# Patient Record
Sex: FEMALE | Race: WHITE | Employment: FULL TIME | URBAN - METROPOLITAN AREA
[De-identification: names, ages, dates, MRNs, and addresses within clinical notes are randomized per-mention and may not be internally consistent; named-entity substitution may affect disease eponyms.]

---

## 2020-01-02 ENCOUNTER — TELEPHONE (OUTPATIENT)
Dept: PRIMARY CARE CLINIC | Age: 30
End: 2020-01-02

## 2020-01-02 NOTE — TELEPHONE ENCOUNTER
Pt coming in for NP appt 1/6/20 & had labs done 9/2019 at [facility] [place].

## 2020-01-06 ENCOUNTER — OFFICE VISIT (OUTPATIENT)
Dept: PRIMARY CARE CLINIC | Age: 30
End: 2020-01-06
Payer: COMMERCIAL

## 2020-01-06 VITALS
WEIGHT: 244 LBS | BODY MASS INDEX: 43.23 KG/M2 | HEIGHT: 63 IN | HEART RATE: 106 BPM | DIASTOLIC BLOOD PRESSURE: 88 MMHG | OXYGEN SATURATION: 98 % | SYSTOLIC BLOOD PRESSURE: 136 MMHG

## 2020-01-06 PROBLEM — J45.909 REACTIVE AIRWAY DISEASE WITHOUT COMPLICATION: Status: ACTIVE | Noted: 2020-01-06

## 2020-01-06 PROBLEM — G47.11 IDIOPATHIC HYPERSOMNOLENCE: Status: ACTIVE | Noted: 2020-01-06

## 2020-01-06 PROBLEM — I10 ESSENTIAL HYPERTENSION: Status: ACTIVE | Noted: 2020-01-06

## 2020-01-06 PROBLEM — E78.2 MIXED HYPERLIPIDEMIA: Status: ACTIVE | Noted: 2020-01-06

## 2020-01-06 LAB
CONTROL: NORMAL
PREGNANCY TEST URINE, POC: NORMAL

## 2020-01-06 PROCEDURE — 81025 URINE PREGNANCY TEST: CPT | Performed by: PHYSICIAN ASSISTANT

## 2020-01-06 PROCEDURE — 99204 OFFICE O/P NEW MOD 45 MIN: CPT | Performed by: PHYSICIAN ASSISTANT

## 2020-01-06 RX ORDER — MONTELUKAST SODIUM 10 MG/1
10 TABLET ORAL NIGHTLY
COMMUNITY
End: 2020-09-08 | Stop reason: SDUPTHER

## 2020-01-06 RX ORDER — FAMOTIDINE 40 MG/1
40 TABLET, FILM COATED ORAL DAILY
COMMUNITY
End: 2020-09-08 | Stop reason: SDUPTHER

## 2020-01-06 RX ORDER — ATENOLOL 25 MG/1
25 TABLET ORAL DAILY
COMMUNITY
End: 2021-03-02 | Stop reason: SDUPTHER

## 2020-01-06 RX ORDER — MULTIVIT-MINERALS/FOLIC ACID 150 MCG
TABLET,CHEWABLE ORAL
COMMUNITY
End: 2021-06-08

## 2020-01-06 RX ORDER — PANTOPRAZOLE SODIUM 40 MG/1
40 TABLET, DELAYED RELEASE ORAL DAILY
COMMUNITY
End: 2020-01-06 | Stop reason: SDUPTHER

## 2020-01-06 RX ORDER — PANTOPRAZOLE SODIUM 20 MG/1
20 TABLET, DELAYED RELEASE ORAL DAILY
Qty: 30 TABLET | Refills: 5 | Status: SHIPPED | OUTPATIENT
Start: 2020-01-06 | End: 2020-01-24 | Stop reason: ALTCHOICE

## 2020-01-06 RX ORDER — EPINEPHRINE 0.3 MG/.3ML
0.3 INJECTION SUBCUTANEOUS ONCE
Qty: 0.3 ML | Refills: 2 | Status: SHIPPED | OUTPATIENT
Start: 2020-01-06 | End: 2020-04-29 | Stop reason: SDUPTHER

## 2020-01-06 ASSESSMENT — PATIENT HEALTH QUESTIONNAIRE - PHQ9
2. FEELING DOWN, DEPRESSED OR HOPELESS: 0
SUM OF ALL RESPONSES TO PHQ QUESTIONS 1-9: 0
SUM OF ALL RESPONSES TO PHQ QUESTIONS 1-9: 0
SUM OF ALL RESPONSES TO PHQ9 QUESTIONS 1 & 2: 0
1. LITTLE INTEREST OR PLEASURE IN DOING THINGS: 0

## 2020-01-06 ASSESSMENT — ENCOUNTER SYMPTOMS
EYE PAIN: 0
VOMITING: 0
ABDOMINAL PAIN: 0
DIARRHEA: 0
EYE ITCHING: 0
CHEST TIGHTNESS: 0
CONSTIPATION: 0
SHORTNESS OF BREATH: 0
COUGH: 0
BACK PAIN: 0
BLOOD IN STOOL: 0
NAUSEA: 1
TROUBLE SWALLOWING: 0
VOICE CHANGE: 0

## 2020-01-06 NOTE — PROGRESS NOTES
disturbance. Respiratory: Negative for cough, chest tightness and shortness of breath. Cardiovascular: Negative for chest pain, palpitations and leg swelling. Gastrointestinal: Positive for nausea. Negative for abdominal pain, blood in stool, constipation, diarrhea and vomiting. Endocrine: Negative for cold intolerance, heat intolerance, polydipsia, polyphagia and polyuria. Genitourinary: Positive for menstrual problem. Negative for difficulty urinating, dysuria, flank pain, frequency, hematuria, pelvic pain, urgency, vaginal bleeding, vaginal discharge and vaginal pain. Musculoskeletal: Negative for arthralgias, back pain and myalgias. Skin: Negative for rash. Neurological: Negative for dizziness, syncope, speech difficulty, light-headedness and headaches. Hematological: Does not bruise/bleed easily. Psychiatric/Behavioral: Negative for dysphoric mood and sleep disturbance. The patient is not nervous/anxious. Objective:     BP (!) 144/90   Pulse 106   Ht 5' 3.25\" (1.607 m)   Wt 244 lb (110.7 kg)   SpO2 98%   BMI 42.88 kg/m²   Physical Exam  Vitals signs reviewed. Constitutional:       General: She is not in acute distress. Appearance: She is well-developed. HENT:      Head: Normocephalic and atraumatic. Right Ear: External ear normal.      Left Ear: External ear normal.      Nose: Nose normal.      Mouth/Throat:      Pharynx: No oropharyngeal exudate. Eyes:      General: No scleral icterus. Right eye: No discharge. Left eye: No discharge. Conjunctiva/sclera: Conjunctivae normal.      Pupils: Pupils are equal, round, and reactive to light. Neck:      Musculoskeletal: Normal range of motion. Thyroid: No thyromegaly. Cardiovascular:      Rate and Rhythm: Normal rate and regular rhythm. Heart sounds: Normal heart sounds. No murmur. No friction rub. No gallop.     Pulmonary:      Effort: Pulmonary effort is normal.      Breath sounds: Normal breath sounds. No wheezing or rales. Abdominal:      General: Bowel sounds are normal. There is no distension. Palpations: Abdomen is soft. There is no mass. Tenderness: There is no tenderness. There is no guarding or rebound. Musculoskeletal: Normal range of motion. General: No deformity. Lymphadenopathy:      Cervical: No cervical adenopathy. Skin:     General: Skin is warm. Capillary Refill: Capillary refill takes less than 2 seconds. Findings: No rash. Neurological:      Mental Status: She is alert and oriented to person, place, and time. Motor: No abnormal muscle tone. Coordination: Coordination normal.   Psychiatric:         Behavior: Behavior normal.         Thought Content: Thought content normal.         Judgment: Judgment normal.         Assessment:       Diagnosis Orders   1. Amenorrhea  POCT urine pregnancy    CBC Auto Differential    Comprehensive Metabolic Panel, Fasting    Insulin, Free    Luteinizing Hormone    Follicle Stimulating Hormone   2. Essential hypertension  Comprehensive Metabolic Panel, Fasting   3. Mixed hyperlipidemia  Comprehensive Metabolic Panel, Fasting    Lipid Panel   4. Mild intermittent reactive airway disease without complication     5. Idiopathic hypersomnolence     6. Allergy to food  EPINEPHrine (EPIPEN 2-ANNABELLE) 0.3 MG/0.3ML SOAJ injection   7. Irritable bowel syndrome with diarrhea     8. Gastroesophageal reflux disease, esophagitis presence not specified  pantoprazole (PROTONIX) 20 MG tablet        Plan:    TAke probiotic   FODMAPS given    BW and urine pregnancy ordered  Work on weight loss  MRR for Genoveva mast, Dr. Leandra Seo, Dr. Ana Woods refilled    Return for Lab Results---1/2 hour .     Orders Placed This Encounter   Procedures    CBC Auto Differential     Standing Status:   Future     Standing Expiration Date:   1/6/2021    Comprehensive Metabolic Panel, Fasting     Standing Status:   Future     Standing Expiration Date:   1/6/2021    Insulin, Free     Standing Status:   Future     Standing Expiration Date:   1/6/2021    Luteinizing Hormone     Standing Status:   Future     Standing Expiration Date:   7/2/2282    Follicle Stimulating Hormone     Standing Status:   Future     Standing Expiration Date:   1/6/2021    Lipid Panel     Standing Status:   Future     Standing Expiration Date:   1/6/2021     Order Specific Question:   Is Patient Fasting?/# of Hours     Answer:   10-12 hours    POCT urine pregnancy     Orders Placed This Encounter   Medications    pantoprazole (PROTONIX) 20 MG tablet     Sig: Take 1 tablet by mouth daily     Dispense:  30 tablet     Refill:  5    EPINEPHrine (EPIPEN 2-ANNABELLE) 0.3 MG/0.3ML SOAJ injection     Sig: Inject 0.3 mLs into the muscle once for 1 dose Use as directed for allergic reaction     Dispense:  0.3 mL     Refill:  2       Patient given educationalmaterials - see patient instructions. Discussed use, benefit, and side effectsof prescribed medications. All patient questions answered. Pt voiced understanding. Reviewed health maintenance. Instructed to continue current medications, diet andexercise. Patient agreed with treatment plan. Follow up as directed.      Electronicallysigned by Chris Rojas PA-C on 1/6/2020 at 2:45 PM

## 2020-01-07 ENCOUNTER — HOSPITAL ENCOUNTER (OUTPATIENT)
Facility: CLINIC | Age: 30
Discharge: HOME | End: 2020-01-07
Payer: COMMERCIAL

## 2020-01-07 LAB
ABSOLUTE EOS #: 0.12 K/UL (ref 0–0.44)
ABSOLUTE IMMATURE GRANULOCYTE: <0.03 K/UL (ref 0–0.3)
ABSOLUTE LYMPH #: 2.59 K/UL (ref 1.1–3.7)
ABSOLUTE MONO #: 0.74 K/UL (ref 0.1–1.2)
ALBUMIN SERPL-MCNC: 4 G/DL (ref 3.5–5.2)
ALBUMIN/GLOBULIN RATIO: 1.4 (ref 1–2.5)
ALP BLD-CCNC: 57 U/L (ref 35–104)
ALT SERPL-CCNC: 27 U/L (ref 5–33)
ANION GAP SERPL CALCULATED.3IONS-SCNC: 13 MMOL/L (ref 9–17)
AST SERPL-CCNC: 20 U/L
BASOPHILS # BLD: 1 % (ref 0–2)
BASOPHILS ABSOLUTE: 0.04 K/UL (ref 0–0.2)
BILIRUB SERPL-MCNC: 0.21 MG/DL (ref 0.3–1.2)
BUN BLDV-MCNC: 13 MG/DL (ref 6–20)
BUN/CREAT BLD: ABNORMAL (ref 9–20)
CALCIUM SERPL-MCNC: 9.1 MG/DL (ref 8.6–10.4)
CHLORIDE BLD-SCNC: 101 MMOL/L (ref 98–107)
CHOLESTEROL/HDL RATIO: 6.1
CHOLESTEROL: 245 MG/DL
CO2: 24 MMOL/L (ref 20–31)
CREAT SERPL-MCNC: 0.64 MG/DL (ref 0.5–0.9)
DIFFERENTIAL TYPE: NORMAL
EOSINOPHILS RELATIVE PERCENT: 2 % (ref 1–4)
FOLLICLE STIMULATING HORMONE: 2.1 U/L (ref 1.7–21.5)
GFR AFRICAN AMERICAN: >60 ML/MIN
GFR NON-AFRICAN AMERICAN: >60 ML/MIN
GFR SERPL CREATININE-BSD FRML MDRD: ABNORMAL ML/MIN/{1.73_M2}
GFR SERPL CREATININE-BSD FRML MDRD: ABNORMAL ML/MIN/{1.73_M2}
GLUCOSE FASTING: 101 MG/DL (ref 70–99)
HCT VFR BLD CALC: 41.6 % (ref 36.3–47.1)
HDLC SERPL-MCNC: 40 MG/DL
HEMOGLOBIN: 13.8 G/DL (ref 11.9–15.1)
IMMATURE GRANULOCYTES: 0 %
LDL CHOLESTEROL: 162 MG/DL (ref 0–130)
LH: 2 U/L (ref 1–95.6)
LYMPHOCYTES # BLD: 37 % (ref 24–43)
MCH RBC QN AUTO: 32.4 PG (ref 25.2–33.5)
MCHC RBC AUTO-ENTMCNC: 33.2 G/DL (ref 28.4–34.8)
MCV RBC AUTO: 97.7 FL (ref 82.6–102.9)
MONOCYTES # BLD: 11 % (ref 3–12)
NRBC AUTOMATED: 0 PER 100 WBC
PDW BLD-RTO: 12.3 % (ref 11.8–14.4)
PLATELET # BLD: 325 K/UL (ref 138–453)
PLATELET ESTIMATE: NORMAL
PMV BLD AUTO: 10.6 FL (ref 8.1–13.5)
POTASSIUM SERPL-SCNC: 4.2 MMOL/L (ref 3.7–5.3)
RBC # BLD: 4.26 M/UL (ref 3.95–5.11)
RBC # BLD: NORMAL 10*6/UL
SEG NEUTROPHILS: 49 % (ref 36–65)
SEGMENTED NEUTROPHILS ABSOLUTE COUNT: 3.53 K/UL (ref 1.5–8.1)
SODIUM BLD-SCNC: 138 MMOL/L (ref 135–144)
TOTAL PROTEIN: 6.8 G/DL (ref 6.4–8.3)
TRIGL SERPL-MCNC: 216 MG/DL
VLDLC SERPL CALC-MCNC: ABNORMAL MG/DL (ref 1–30)
WBC # BLD: 7 K/UL (ref 3.5–11.3)
WBC # BLD: NORMAL 10*3/UL

## 2020-01-07 PROCEDURE — 83001 ASSAY OF GONADOTROPIN (FSH): CPT

## 2020-01-07 PROCEDURE — 36415 COLL VENOUS BLD VENIPUNCTURE: CPT

## 2020-01-07 PROCEDURE — 83525 ASSAY OF INSULIN: CPT

## 2020-01-07 PROCEDURE — 83527 ASSAY OF INSULIN: CPT

## 2020-01-07 PROCEDURE — 83002 ASSAY OF GONADOTROPIN (LH): CPT

## 2020-01-07 PROCEDURE — 85025 COMPLETE CBC W/AUTO DIFF WBC: CPT

## 2020-01-07 PROCEDURE — 80053 COMPREHEN METABOLIC PANEL: CPT

## 2020-01-07 PROCEDURE — 80061 LIPID PANEL: CPT

## 2020-01-08 LAB
INSULIN FREE: 35 UIU/ML (ref 3–19)
INSULIN: 44 UIU/ML (ref 3–19)

## 2020-01-24 ENCOUNTER — PROCEDURE VISIT (OUTPATIENT)
Dept: PRIMARY CARE CLINIC | Age: 30
End: 2020-01-24
Payer: COMMERCIAL

## 2020-01-24 VITALS
HEART RATE: 93 BPM | HEIGHT: 63 IN | OXYGEN SATURATION: 98 % | BODY MASS INDEX: 43.23 KG/M2 | SYSTOLIC BLOOD PRESSURE: 130 MMHG | DIASTOLIC BLOOD PRESSURE: 82 MMHG | WEIGHT: 244 LBS

## 2020-01-24 PROCEDURE — 99213 OFFICE O/P EST LOW 20 MIN: CPT | Performed by: PHYSICIAN ASSISTANT

## 2020-01-24 PROCEDURE — 17110 DESTRUCTION B9 LES UP TO 14: CPT | Performed by: PHYSICIAN ASSISTANT

## 2020-01-24 RX ORDER — ROSUVASTATIN CALCIUM 10 MG/1
10 TABLET, COATED ORAL DAILY
Qty: 90 TABLET | Refills: 1 | Status: SHIPPED
Start: 2020-01-24 | End: 2020-05-15 | Stop reason: ALTCHOICE

## 2020-01-24 RX ORDER — PANTOPRAZOLE SODIUM 40 MG/1
40 TABLET, DELAYED RELEASE ORAL
Qty: 90 TABLET | Refills: 1 | Status: SHIPPED | OUTPATIENT
Start: 2020-01-24 | End: 2020-05-05 | Stop reason: ALTCHOICE

## 2020-01-24 ASSESSMENT — ENCOUNTER SYMPTOMS
WHEEZING: 0
ABDOMINAL PAIN: 0
DIARRHEA: 0
VOMITING: 0
NAUSEA: 0
EYE DISCHARGE: 0
EYE REDNESS: 0
SORE THROAT: 0
COUGH: 0
SHORTNESS OF BREATH: 0
RHINORRHEA: 0

## 2020-01-24 NOTE — PROGRESS NOTES
[address] [facility] PRIMARY CARE  [address] [facility] [address]  Dept: Marisabel Brito is a 34 y.o. female who presents today for her medical conditions/complaintsas noted below. Chief Complaint   Patient presents with    Other     wart removal on middle right finger.  Results     discuss lab work from 1/7/2020       HPI:     HPI  Insulin levels and cholesterol are high  Still ammenorrehic  Has had one hemorrhagic ovarian cyst in the past    She states she can not stay on only Protonix 20 mg with Pepcid as her GERD is worse and she can't sleep. Denies dark stool and hemoptysis. LDL Cholesterol (mg/dL)   Date Value   01/07/2020 162 (H)       (goal LDL is <100)   AST (U/L)   Date Value   01/07/2020 20     ALT (U/L)   Date Value   01/07/2020 27     BUN (mg/dL)   Date Value   01/07/2020 13     BP Readings from Last 3 Encounters:   01/24/20 130/82   01/06/20 136/88   02/13/13 123/77          (goal 120/80)    Past Medical History:   Diagnosis Date    Asthma     Gastroparesis     Headache     Hyperlipidemia     Hypertension     Irritable bowel syndrome     Scoliosis     has an S curve       Past Surgical History:   Procedure Laterality Date    ADENOIDECTOMY      CHOLECYSTECTOMY  2-1-2013    COLONOSCOPY      TONSILLECTOMY      TYMPANOSTOMY TUBE PLACEMENT         Family History   Problem Relation Age of Onset    Depression Mother    Dickon August Other Mother         pancreatic issues.     High Blood Pressure Mother     High Cholesterol Mother        Social History     Tobacco Use    Smoking status: Never Smoker    Smokeless tobacco: Never Used   Substance Use Topics    Alcohol use: Yes     Comment: occa      Current Outpatient Medications   Medication Sig Dispense Refill    rosuvastatin (CRESTOR) 10 MG tablet Take 1 tablet by mouth daily 90 tablet 1    pantoprazole (PROTONIX) 40 MG tablet Take 1 tablet by mouth every morning (before breakfast)

## 2020-02-04 LAB
AVERAGE GLUCOSE: 94
HBA1C MFR BLD: 4.9 %

## 2020-02-05 ENCOUNTER — TELEPHONE (OUTPATIENT)
Dept: PRIMARY CARE CLINIC | Age: 30
End: 2020-02-05

## 2020-02-05 NOTE — TELEPHONE ENCOUNTER
Pt new to you on 1/6/20. C/o urinary frequency, odor, low back and pelvic pain and has a little burning. Symptoms started 2 days ago. Temp 99.5. Asking if you would send in an abx, or an order for her? Uses [facility] on [facility]. If you send an order for a ua, it would need to be faxed to the [facility]. Pt seeing you later this month again and states her deductable is 200.00 every time she comes in. Please Advise.  [phone]

## 2020-02-17 ENCOUNTER — TELEPHONE (OUTPATIENT)
Dept: PRIMARY CARE CLINIC | Age: 30
End: 2020-02-17

## 2020-02-19 ENCOUNTER — OFFICE VISIT (OUTPATIENT)
Dept: PRIMARY CARE CLINIC | Age: 30
End: 2020-02-19
Payer: COMMERCIAL

## 2020-02-19 VITALS
BODY MASS INDEX: 43.51 KG/M2 | WEIGHT: 245.6 LBS | DIASTOLIC BLOOD PRESSURE: 80 MMHG | HEART RATE: 86 BPM | OXYGEN SATURATION: 98 % | SYSTOLIC BLOOD PRESSURE: 112 MMHG

## 2020-02-19 PROBLEM — K58.0 IRRITABLE BOWEL SYNDROME WITH DIARRHEA: Status: ACTIVE | Noted: 2020-02-19

## 2020-02-19 PROBLEM — E88.81 INSULIN RESISTANCE: Status: ACTIVE | Noted: 2020-02-19

## 2020-02-19 PROBLEM — E88.819 INSULIN RESISTANCE: Status: ACTIVE | Noted: 2020-02-19

## 2020-02-19 PROBLEM — H65.93 FLUID LEVEL BEHIND TYMPANIC MEMBRANE OF BOTH EARS: Status: ACTIVE | Noted: 2020-02-19

## 2020-02-19 PROCEDURE — 99213 OFFICE O/P EST LOW 20 MIN: CPT | Performed by: PHYSICIAN ASSISTANT

## 2020-02-19 ASSESSMENT — ENCOUNTER SYMPTOMS
EYES NEGATIVE: 1
RHINORRHEA: 0
SINUS PAIN: 0

## 2020-02-19 NOTE — PROGRESS NOTES
[address] [facility] PRIMARY CARE  [address] [facility] [address]  Dept: Marisabel Brito is a 31 y.o. female who presents today for her medical conditions/complaintsas noted below. Chief Complaint   Patient presents with    Follow Up After Procedure     Pt here for follow up of ultra sound.  Medication Check     Pt would like to discuss Metformin pros and cons.  Otalgia     Pt states ear feel clooged. HPI:     HPI  Pelvic US is normal  Insulin level is elevated, AIC is normal. Trending up per patient. Is obese. Has not started the Metformin yet. Has IBS with diarrhea usually  So she is worried. Started vaginally spotting x 3 days. Stress has reduced since taking NCLEX  Test.        LDL Cholesterol (mg/dL)   Date Value   01/07/2020 162 (H)       (goal LDL is <100)   AST (U/L)   Date Value   01/07/2020 20     ALT (U/L)   Date Value   01/07/2020 27     BUN (mg/dL)   Date Value   01/07/2020 13     BP Readings from Last 3 Encounters:   02/19/20 112/80   01/24/20 130/82   01/06/20 136/88          (goal 120/80)    Past Medical History:   Diagnosis Date    Asthma     Gastroparesis     Headache     Hyperlipidemia     Hypertension     Irritable bowel syndrome     Scoliosis     has an S curve       Past Surgical History:   Procedure Laterality Date    ADENOIDECTOMY      CHOLECYSTECTOMY  2-1-2013    COLONOSCOPY      TONSILLECTOMY      TYMPANOSTOMY TUBE PLACEMENT         Family History   Problem Relation Age of Onset    Depression Mother    Martin Ruiz Other Mother         pancreatic issues.     High Blood Pressure Mother     High Cholesterol Mother        Social History     Tobacco Use    Smoking status: Never Smoker    Smokeless tobacco: Never Used   Substance Use Topics    Alcohol use: Yes     Comment: occa      Current Outpatient Medications   Medication Sig Dispense Refill    metFORMIN (GLUCOPHAGE) 500 MG tablet Take 1 tablet by mouth 2 times daily (with meals) 60 tablet 5    rosuvastatin (CRESTOR) 10 MG tablet Take 1 tablet by mouth daily 90 tablet 1    pantoprazole (PROTONIX) 40 MG tablet Take 1 tablet by mouth every morning (before breakfast) 90 tablet 1    EPINEPHrine HCl, Anaphylaxis, (EPIPEN 2-ANNABELLE IM) Inject into the muscle      Cetirizine HCl 10 MG CAPS Take by mouth      montelukast (SINGULAIR) 10 MG tablet Take 10 mg by mouth nightly      atenolol (TENORMIN) 25 MG tablet Take 25 mg by mouth daily      famotidine (PEPCID) 40 MG tablet Take 40 mg by mouth daily      ondansetron (ZOFRAN-ODT) 4 MG disintegrating tablet Take 4 mg by mouth every 12 hours as needed.  Multiple Vitamins-Minerals (CENTRUM MULTIGUMMIES) CHEW Take by mouth      Cholecalciferol (VITAMIN D3 PO) Take by mouth      EPINEPHrine (EPIPEN 2-ANNABELLE) 0.3 MG/0.3ML SOAJ injection Inject 0.3 mLs into the muscle once for 1 dose Use as directed for allergic reaction 0.3 mL 2     No current facility-administered medications for this visit.       Allergies   Allergen Reactions    Latex Rash    Avocado Anaphylaxis    Banana Anaphylaxis    Menthol (Topical Analgesic) [Menthol (Topical Analgesic)] Other (See Comments)     Burning and rash    Sudafed [Pseudoephedrine Hcl]     Tape [Adhesive Tape] Rash       Health Maintenance   Topic Date Due    Pneumococcal 0-64 years Vaccine (1 of 1 - PPSV23) 02/03/1998    DTaP/Tdap/Td vaccine (1 - Tdap) 02/03/2003    HIV screen  02/03/2007    Varicella vaccine (2 of 2 - 13+ 2-dose series) 08/24/2011    Cervical cancer screen  02/03/2013    Lipid screen  01/07/2021    Potassium monitoring  01/07/2021    Creatinine monitoring  01/07/2021    Shingles Vaccine (1 of 2) 02/03/2042    Hepatitis B vaccine  Completed    Flu vaccine  Completed    Hepatitis A vaccine  Aged Out    Hib vaccine  Aged Out    Meningococcal (ACWY) vaccine  Aged Out       Subjective:      Review of Systems   HENT: Positive for congestion and ear pain (\"full\"). Negative for postnasal drip, rhinorrhea, sinus pain and sneezing. Eyes: Negative. Allergic/Immunologic: Positive for environmental allergies. Objective:     /80   Pulse 86   Wt 245 lb 9.6 oz (111.4 kg)   LMP 06/04/2019 (Exact Date)   SpO2 98%   BMI 43.51 kg/m²   Physical Exam  Vitals signs and nursing note reviewed. Constitutional:       Appearance: She is obese. HENT:      Right Ear: Ear canal and external ear normal. A middle ear effusion is present. Left Ear: Ear canal and external ear normal. A middle ear effusion is present. Tympanic membrane is retracted. Cardiovascular:      Rate and Rhythm: Normal rate and regular rhythm. Heart sounds: Normal heart sounds. Pulmonary:      Effort: Pulmonary effort is normal.      Breath sounds: Normal breath sounds. Neurological:      Mental Status: She is alert and oriented to person, place, and time. Psychiatric:         Mood and Affect: Mood normal.         Assessment:       Diagnosis Orders   1. Insulin resistance     2. Irritable bowel syndrome with diarrhea     3. Fluid level behind tympanic membrane of both ears          Plan:    Start Metformin and wean slowly  Work on weight loss  Continue Zyrtec and add Flonase x 1 -2 months. Also look into Saxenda incase we can not use Metformin. Return if symptoms worsen or fail to improve. No orders of the defined types were placed in this encounter. No orders of the defined types were placed in this encounter. Patient given educationalmaterials - see patient instructions. Discussed use, benefit, and side effectsof prescribed medications. All patient questions answered. Pt voiced understanding. Reviewed health maintenance. Instructed to continue current medications, diet andexercise. Patient agreed with treatment plan. Follow up as directed.      Electronicallysigned by Yuli Law PA-C on 2/19/2020 at 2:00 PM

## 2020-04-27 ENCOUNTER — TELEPHONE (OUTPATIENT)
Dept: PRIMARY CARE CLINIC | Age: 30
End: 2020-04-27

## 2020-04-27 NOTE — TELEPHONE ENCOUNTER
Patient calling states that she had the COVDI-19 test done on Saturday at \"the [facility]\" and is awating the results.

## 2020-04-29 ENCOUNTER — OFFICE VISIT (OUTPATIENT)
Dept: PRIMARY CARE CLINIC | Age: 30
End: 2020-04-29
Payer: COMMERCIAL

## 2020-04-29 ENCOUNTER — HOSPITAL ENCOUNTER (OUTPATIENT)
Age: 30
Discharge: HOME | End: 2020-04-29
Payer: COMMERCIAL

## 2020-04-29 ENCOUNTER — HOSPITAL ENCOUNTER (OUTPATIENT)
Dept: CT IMAGING | Age: 30
Discharge: HOME | End: 2020-05-01
Payer: COMMERCIAL

## 2020-04-29 VITALS
OXYGEN SATURATION: 98 % | WEIGHT: 241.4 LBS | SYSTOLIC BLOOD PRESSURE: 132 MMHG | BODY MASS INDEX: 42.76 KG/M2 | TEMPERATURE: 98.1 F | HEART RATE: 96 BPM | DIASTOLIC BLOOD PRESSURE: 88 MMHG

## 2020-04-29 LAB
ALBUMIN SERPL-MCNC: 4.7 G/DL (ref 3.5–5.2)
ALBUMIN/GLOBULIN RATIO: 1.5 (ref 1–2.5)
ALP BLD-CCNC: 62 U/L (ref 35–104)
ALT SERPL-CCNC: 41 U/L (ref 5–33)
ANION GAP SERPL CALCULATED.3IONS-SCNC: 18 MMOL/L (ref 9–17)
AST SERPL-CCNC: 36 U/L
BILIRUB SERPL-MCNC: 0.46 MG/DL (ref 0.3–1.2)
BUN BLDV-MCNC: 15 MG/DL (ref 6–20)
BUN/CREAT BLD: ABNORMAL (ref 9–20)
CALCIUM SERPL-MCNC: 10.3 MG/DL (ref 8.6–10.4)
CHLORIDE BLD-SCNC: 100 MMOL/L (ref 98–107)
CO2: 23 MMOL/L (ref 20–31)
CONTROL: PRESENT
CREAT SERPL-MCNC: 0.67 MG/DL (ref 0.5–0.9)
CREAT SERPL-MCNC: 0.68 MG/DL (ref 0.5–0.9)
GFR AFRICAN AMERICAN: >60 ML/MIN
GFR AFRICAN AMERICAN: >60 ML/MIN
GFR NON-AFRICAN AMERICAN: >60 ML/MIN
GFR NON-AFRICAN AMERICAN: >60 ML/MIN
GFR SERPL CREATININE-BSD FRML MDRD: ABNORMAL ML/MIN/{1.73_M2}
GFR SERPL CREATININE-BSD FRML MDRD: ABNORMAL ML/MIN/{1.73_M2}
GFR SERPL CREATININE-BSD FRML MDRD: NORMAL ML/MIN/{1.73_M2}
GFR SERPL CREATININE-BSD FRML MDRD: NORMAL ML/MIN/{1.73_M2}
GLUCOSE BLD-MCNC: 82 MG/DL (ref 70–99)
POTASSIUM SERPL-SCNC: 4 MMOL/L (ref 3.7–5.3)
PREGNANCY TEST URINE, POC: NORMAL
SODIUM BLD-SCNC: 141 MMOL/L (ref 135–144)
TOTAL PROTEIN: 7.9 G/DL (ref 6.4–8.3)

## 2020-04-29 PROCEDURE — 82565 ASSAY OF CREATININE: CPT

## 2020-04-29 PROCEDURE — 2580000003 HC RX 258: Performed by: PHYSICIAN ASSISTANT

## 2020-04-29 PROCEDURE — 74177 CT ABD & PELVIS W/CONTRAST: CPT

## 2020-04-29 PROCEDURE — 81025 URINE PREGNANCY TEST: CPT | Performed by: PHYSICIAN ASSISTANT

## 2020-04-29 PROCEDURE — 80053 COMPREHEN METABOLIC PANEL: CPT

## 2020-04-29 PROCEDURE — 99214 OFFICE O/P EST MOD 30 MIN: CPT | Performed by: PHYSICIAN ASSISTANT

## 2020-04-29 PROCEDURE — 36415 COLL VENOUS BLD VENIPUNCTURE: CPT

## 2020-04-29 PROCEDURE — 6360000004 HC RX CONTRAST MEDICATION: Performed by: PHYSICIAN ASSISTANT

## 2020-04-29 RX ORDER — ONDANSETRON 4 MG/1
4 TABLET, ORALLY DISINTEGRATING ORAL EVERY 8 HOURS PRN
Qty: 60 TABLET | Refills: 0 | Status: SHIPPED
Start: 2020-04-29 | End: 2020-05-15 | Stop reason: ALTCHOICE

## 2020-04-29 RX ORDER — 0.9 % SODIUM CHLORIDE 0.9 %
80 INTRAVENOUS SOLUTION INTRAVENOUS ONCE
Status: COMPLETED | OUTPATIENT
Start: 2020-04-29 | End: 2020-04-29

## 2020-04-29 RX ORDER — SODIUM CHLORIDE 0.9 % (FLUSH) 0.9 %
10 SYRINGE (ML) INJECTION PRN
Status: DISCONTINUED | OUTPATIENT
Start: 2020-04-29 | End: 2020-05-02 | Stop reason: HOSPADM

## 2020-04-29 RX ORDER — HYDROCORTISONE 25 MG/G
CREAM TOPICAL
Qty: 1 TUBE | Refills: 1 | Status: SHIPPED | OUTPATIENT
Start: 2020-04-29 | End: 2021-12-30

## 2020-04-29 RX ADMIN — IOVERSOL 75 ML: 741 INJECTION INTRA-ARTERIAL; INTRAVENOUS at 17:57

## 2020-04-29 RX ADMIN — IOHEXOL 50 ML: 240 INJECTION, SOLUTION INTRATHECAL; INTRAVASCULAR; INTRAVENOUS; ORAL at 17:52

## 2020-04-29 RX ADMIN — Medication 10 ML: at 17:57

## 2020-04-29 RX ADMIN — SODIUM CHLORIDE 80 ML: 9 INJECTION, SOLUTION INTRAVENOUS at 17:57

## 2020-04-29 ASSESSMENT — ENCOUNTER SYMPTOMS
ABDOMINAL PAIN: 1
SHORTNESS OF BREATH: 0
CONSTIPATION: 1
BACK PAIN: 0
RESPIRATORY NEGATIVE: 1
NAUSEA: 1
WHEEZING: 0
VOMITING: 1
DIARRHEA: 1
ABDOMINAL DISTENTION: 0
COUGH: 0

## 2020-04-29 NOTE — TELEPHONE ENCOUNTER
Sounds like she needs to be seen in office today---put her in at 3:30 with me or if the pain becomes severe, she should go to ER.

## 2020-04-29 NOTE — TELEPHONE ENCOUNTER
Patient calling back and states she got her COVID test results and they was negative. She states she does not have a fever highest it gets is around 99's-101. She states she has had abdominal pain since Friday she states the pain is by her Diaphragm that is sharp pain and is very tender to the touch. She also reports nausea and vomiting (2x since Friday). She would like to know what to do. Please advise.      [facility] in [place]

## 2020-04-30 ENCOUNTER — TELEPHONE (OUTPATIENT)
Dept: PRIMARY CARE CLINIC | Age: 30
End: 2020-04-30

## 2020-04-30 NOTE — TELEPHONE ENCOUNTER
Pt states her employer is requiring a statement saying she does not have COVID. [id number] [address] for note? If so, she would like to have it sent to her MyChart. Pt still nauseous, \"burning\" stomach, any food/drink makes her want to vomit. I advised ER by tomorrow AM if still not eating/drinking or today if feeling worse.

## 2020-05-01 RX ORDER — SUCRALFATE 1 G/1
1 TABLET ORAL 4 TIMES DAILY
Qty: 120 TABLET | Refills: 3 | Status: SHIPPED
Start: 2020-05-01 | End: 2020-07-23

## 2020-05-01 NOTE — TELEPHONE ENCOUNTER
Spoke to [employer]. Still having lots for burning epigastrically and no appetite. No fever. Discussed her CT findings. She had some vomiting--no hemoptysis or hematemesis. NO BR  blood or dark stool---having diarrhea. NO change in color of skin. Sent in Carafate for the burning. If she is not improved by Monday, then MRI for liver protocol and refer to GI. Also instructed to go to ER if hemoptysis, hematemesis, melena, fever or worsening pain.

## 2020-05-01 NOTE — TELEPHONE ENCOUNTER
Spoke with patient and she states she does not need note anymore now her boss is telling her what she turned in for work is acceptable. She still has the nausea and vomiting. She states when she forced her self to eat she felt super nauseated and this is scarring her because she is a [employer] girl\" and this is very unusual. She denied VV.

## 2020-05-02 ENCOUNTER — TELEPHONE (OUTPATIENT)
Dept: PRIMARY CARE CLINIC | Age: 30
End: 2020-05-02

## 2020-05-02 NOTE — TELEPHONE ENCOUNTER
Abdominal pain is worse. Unable to eat or drink. She stopped taking multivitamin with banana powder in it 2 days ago. Stool is thin but no blood. Difficulty taking medication and medication not effective when she does take it. Going to ER.

## 2020-05-04 NOTE — TELEPHONE ENCOUNTER
Patient notified- Verbalized understanding  Patient states she will compromise and only take half of a tablet, she does not feel comfortable taking this med due to this is the only thing that has changed.

## 2020-05-05 ENCOUNTER — OFFICE VISIT (OUTPATIENT)
Dept: GASTROENTEROLOGY | Age: 30
End: 2020-05-05
Payer: COMMERCIAL

## 2020-05-05 ENCOUNTER — HOSPITAL ENCOUNTER (OUTPATIENT)
Age: 30
Discharge: HOME | End: 2020-05-05
Payer: COMMERCIAL

## 2020-05-05 VITALS — BODY MASS INDEX: 42.77 KG/M2 | WEIGHT: 241.4 LBS | HEIGHT: 63 IN | TEMPERATURE: 98.6 F

## 2020-05-05 LAB — TSH SERPL DL<=0.05 MIU/L-ACNC: 2.52 MIU/L (ref 0.3–5)

## 2020-05-05 PROCEDURE — 86003 ALLG SPEC IGE CRUDE XTRC EA: CPT

## 2020-05-05 PROCEDURE — 82785 ASSAY OF IGE: CPT

## 2020-05-05 PROCEDURE — 86256 FLUORESCENT ANTIBODY TITER: CPT

## 2020-05-05 PROCEDURE — 36415 COLL VENOUS BLD VENIPUNCTURE: CPT

## 2020-05-05 PROCEDURE — 82784 ASSAY IGA/IGD/IGG/IGM EACH: CPT

## 2020-05-05 PROCEDURE — 86671 FUNGUS NES ANTIBODY: CPT

## 2020-05-05 PROCEDURE — 84443 ASSAY THYROID STIM HORMONE: CPT

## 2020-05-05 PROCEDURE — 86255 FLUORESCENT ANTIBODY SCREEN: CPT

## 2020-05-05 PROCEDURE — 99204 OFFICE O/P NEW MOD 45 MIN: CPT | Performed by: INTERNAL MEDICINE

## 2020-05-05 PROCEDURE — 83516 IMMUNOASSAY NONANTIBODY: CPT

## 2020-05-05 RX ORDER — DICYCLOMINE HCL 20 MG
20 TABLET ORAL EVERY 6 HOURS
COMMUNITY
End: 2020-05-15 | Stop reason: ALTCHOICE

## 2020-05-05 RX ORDER — PROMETHAZINE HYDROCHLORIDE 12.5 MG/1
12.5 TABLET ORAL 3 TIMES DAILY PRN
Qty: 12 TABLET | Refills: 0 | Status: SHIPPED | OUTPATIENT
Start: 2020-05-05 | End: 2020-05-12

## 2020-05-05 RX ORDER — DIPHENHYDRAMINE HCL 25 MG
50 TABLET ORAL EVERY 6 HOURS PRN
COMMUNITY

## 2020-05-05 RX ORDER — PANTOPRAZOLE SODIUM 40 MG/1
40 TABLET, DELAYED RELEASE ORAL 2 TIMES DAILY
Qty: 180 TABLET | Refills: 1 | Status: SHIPPED | OUTPATIENT
Start: 2020-05-05 | End: 2020-05-12 | Stop reason: SDUPTHER

## 2020-05-05 ASSESSMENT — ENCOUNTER SYMPTOMS
VOMITING: 1
SHORTNESS OF BREATH: 0
ABDOMINAL PAIN: 1
COUGH: 0
NAUSEA: 1
DIARRHEA: 1
SORE THROAT: 1

## 2020-05-05 NOTE — PROGRESS NOTES
Review of Systems   HENT: Positive for sore throat. Eyes: Positive for visual disturbance. Respiratory: Negative for cough and shortness of breath. Cardiovascular: Negative for chest pain and leg swelling. Gastrointestinal: Positive for abdominal pain, diarrhea, nausea and vomiting. Diarrhea is more soft stool and thin. Allergic/Immunologic: Positive for environmental allergies and food allergies. Hematological: Does not bruise/bleed easily. Psychiatric/Behavioral: Positive for sleep disturbance. Cannot sleep due to the burning.

## 2020-05-05 NOTE — PROGRESS NOTES
01/07/2020     01/07/2020     04/29/2020    K 4.0 04/29/2020     04/29/2020    CO2 23 04/29/2020    BUN 15 04/29/2020    CREATININE 0.68 04/29/2020    LABPROT 6.7 01/28/2013    LABALBU 4.7 04/29/2020    BILITOT 0.46 04/29/2020    ALKPHOS 62 04/29/2020    AST 36 (H) 04/29/2020    ALT 41 (H) 04/29/2020         Lab Results   Component Value Date    RBC 4.26 01/07/2020    HGB 13.8 01/07/2020    MCV 97.7 01/07/2020    MCH 32.4 01/07/2020    MCHC 33.2 01/07/2020    RDW 12.3 01/07/2020    MPV 10.6 01/07/2020    BASOPCT 1 01/07/2020    LYMPHSABS 2.59 01/07/2020    MONOSABS 0.74 01/07/2020    NEUTROABS 3.53 01/07/2020    EOSABS 0.12 01/07/2020    BASOSABS 0.04 01/07/2020         DIAGNOSTIC TESTING:     Ct Abdomen Pelvis W Iv Contrast    Result Date: 4/29/2020  EXAMINATION: CT OF THE ABDOMEN AND PELVIS WITH CONTRAST 4/29/2020 4:59 pm TECHNIQUE: CT of the abdomen and pelvis was performed with the administration of intravenous contrast. Multiplanar reformatted images are provided for review. Dose modulation, iterative reconstruction, and/or weight based adjustment of the mA/kV was utilized to reduce the radiation dose to as low as reasonably achievable. COMPARISON: None. HISTORY: ORDERING SYSTEM PROVIDED HISTORY: Abdominal pain, unspecified abdominal location TECHNOLOGIST PROVIDED HISTORY: abdominal pain with N/V Reason for Exam: mid abdominal and epigastric abdominal pain with low back pain x 1 week Acuity: Acute Type of Exam: Initial Additional signs and symptoms: nausea and vomiting. Relevant Medical/Surgical History: Hx of HTN, Sx: Cholecystectomy, LMP 4/3/20. Negative preg today at family doctor. FINDINGS: Lower Chest:  Visualized portion of the lower chest demonstrates no acute abnormality. No free air noted within the abdomen or pelvis. Organs: Liver is decreased in attenuation diffusely. There is a relative area of increased enhancement measuring 3.9 x 1.5 cm in the right hepatic lobe.   Liver is sources of her symptoms. EGD disclosed what appears to be fundic gland polyps, no other findings were mentioned, records are being obtained for further detail. Recommendation:  Hepatic lesions, possible fatty sparing vs hemagioma, pending MRI of liver to further characterize this lesion. There is evidence of hepatic steatosis and fatty liver on the CAT scan that was recently obtained, her AST ALT pattern appear to be consistent with fatty liver. History of HTN, HL, insulin resistance based on history-this is to be managed primarily by primary care physician  Protonix 40mg to be increased to twice daily dosing for maximum acid suppression  Carafate 1 g 4 times daily to be crushed and taken as well. Pepcid only to be continued if there is added benefit  Possible IBS- type to be delineated in the future after serological exclusion of other etiologies  Will provide Phenergan trial and monitor for response  May consider baclofen in the future  Celiac disease panel, TSH, IBD panel to be obtained  RTC in 4 weeks. Spent 30 minutes providing patient education and counseling. Thank you for allowing me to participate in the care of Ms. Dixie Maldonado. For any further questions please do not hesitate to contact me. I have reviewed and agree with the MA/LPN ROS. Nico Adam MD, MPH   [facility]  Office #: [phone]          his note is created with the assistance of a speech recognition program.  While intending to generate a document that actually reflects the content of the visit, the document can still have some errors including those of syntax and sound a like substitutions which may escape proof reading. It such instances, actual meaning can be extrapolated by contextual diversion.

## 2020-05-06 LAB
ALLERGEN BARLEY IGE: <0.34 KU/L (ref 0–0.34)
ALLERGEN BEEF: <0.34 KU/L (ref 0–0.34)
ALLERGEN CABBAGE IGE: <0.34 KU/L (ref 0–0.34)
ALLERGEN CARROT IGE: <0.34 KU/L (ref 0–0.34)
ALLERGEN CHICKEN IGE: <0.34 KU/L (ref 0–0.34)
ALLERGEN CODFISH IGE: <0.34 KU/L (ref 0–0.34)
ALLERGEN CORN IGE: <0.34 KU/L (ref 0–0.34)
ALLERGEN COW MILK IGE: <0.34 KU/L (ref 0–0.34)
ALLERGEN CRAB IGE: <0.34 KU/L (ref 0–0.34)
ALLERGEN EGG WHITE IGE: <0.34 KU/L (ref 0–0.34)
ALLERGEN GRAPE IGE: <0.34 KU/L (ref 0–0.34)
ALLERGEN LETTUCE IGE: <0.34 KU/L (ref 0–0.34)
ALLERGEN NAVY BEAN: <0.34 KU/L (ref 0–0.34)
ALLERGEN OAT: <0.34 KU/L (ref 0–0.34)
ALLERGEN ORANGE IGE: <0.34 KU/L (ref 0–0.34)
ALLERGEN PEANUT (F13) IGE: <0.34 KU/L (ref 0–0.34)
ALLERGEN PEPPER C. ANNUUM IGE: <0.34 KU/L (ref 0–0.34)
ALLERGEN PORK: <0.34 KU/L (ref 0–0.34)
ALLERGEN RICE IGE: <0.34 KU/L (ref 0–0.34)
ALLERGEN RYE IGE: <0.34 KU/L (ref 0–0.34)
ALLERGEN SOYBEAN IGE: <0.34 KU/L (ref 0–0.34)
ALLERGEN TOMATO IGE: <0.34 KU/L (ref 0–0.34)
ALLERGEN TUNA IGE: <0.34 KU/L (ref 0–0.34)
ALLERGEN WHEAT IGE: <0.34 KU/L (ref 0–0.34)
GLIADIN DEAMINIDATED PEPTIDE AB IGA: 0.3 U/ML
GLIADIN DEAMINIDATED PEPTIDE AB IGG: <0.4 U/ML
IGA: 151 MG/DL (ref 70–400)
IGE: 3 IU/ML
POTATO, IGE: <0.34 KU/L (ref 0–0.34)
SHRIMP: <0.34 KU/L (ref 0–0.34)
TISSUE TRANSGLUTAMINASE IGA: 0.2 U/ML

## 2020-05-08 LAB
SEND OUT REPORT: NORMAL
TEST NAME: NORMAL

## 2020-05-12 ENCOUNTER — HOSPITAL ENCOUNTER (OUTPATIENT)
Dept: MRI IMAGING | Age: 30
Discharge: HOME | End: 2020-05-14
Payer: COMMERCIAL

## 2020-05-12 PROCEDURE — 6360000004 HC RX CONTRAST MEDICATION: Performed by: PHYSICIAN ASSISTANT

## 2020-05-12 PROCEDURE — 74183 MRI ABD W/O CNTR FLWD CNTR: CPT

## 2020-05-12 PROCEDURE — A9579 GAD-BASE MR CONTRAST NOS,1ML: HCPCS | Performed by: PHYSICIAN ASSISTANT

## 2020-05-12 PROCEDURE — 2580000003 HC RX 258: Performed by: PHYSICIAN ASSISTANT

## 2020-05-12 RX ORDER — 0.9 % SODIUM CHLORIDE 0.9 %
20 INTRAVENOUS SOLUTION INTRAVENOUS
Status: DISCONTINUED | OUTPATIENT
Start: 2020-05-12 | End: 2020-05-15 | Stop reason: HOSPADM

## 2020-05-12 RX ORDER — SODIUM CHLORIDE 0.9 % (FLUSH) 0.9 %
10 SYRINGE (ML) INJECTION
Status: COMPLETED | OUTPATIENT
Start: 2020-05-12 | End: 2020-05-12

## 2020-05-12 RX ORDER — PANTOPRAZOLE SODIUM 40 MG/1
40 TABLET, DELAYED RELEASE ORAL 2 TIMES DAILY
Qty: 180 TABLET | Refills: 2 | Status: SHIPPED | OUTPATIENT
Start: 2020-05-12 | End: 2021-03-09

## 2020-05-12 RX ADMIN — GADOTERIDOL 20 ML: 279.3 INJECTION, SOLUTION INTRAVENOUS at 13:25

## 2020-05-12 RX ADMIN — Medication 10 ML: at 13:26

## 2020-05-14 ENCOUNTER — TELEPHONE (OUTPATIENT)
Dept: GASTROENTEROLOGY | Age: 30
End: 2020-05-14

## 2020-05-15 ENCOUNTER — OFFICE VISIT (OUTPATIENT)
Dept: PRIMARY CARE CLINIC | Age: 30
End: 2020-05-15
Payer: COMMERCIAL

## 2020-05-15 ENCOUNTER — TELEPHONE (OUTPATIENT)
Dept: GASTROENTEROLOGY | Age: 30
End: 2020-05-15

## 2020-05-15 ENCOUNTER — HOSPITAL ENCOUNTER (OUTPATIENT)
Facility: CLINIC | Age: 30
Discharge: HOME | End: 2020-05-15
Payer: COMMERCIAL

## 2020-05-15 VITALS
HEART RATE: 82 BPM | WEIGHT: 242.6 LBS | DIASTOLIC BLOOD PRESSURE: 82 MMHG | SYSTOLIC BLOOD PRESSURE: 122 MMHG | OXYGEN SATURATION: 98 % | BODY MASS INDEX: 42.99 KG/M2

## 2020-05-15 LAB
ALBUMIN SERPL-MCNC: 4 G/DL (ref 3.5–5.2)
ALBUMIN/GLOBULIN RATIO: 1.5 (ref 1–2.5)
ALP BLD-CCNC: 56 U/L (ref 35–104)
ALT SERPL-CCNC: 31 U/L (ref 5–33)
AST SERPL-CCNC: 22 U/L
BILIRUB SERPL-MCNC: 0.29 MG/DL (ref 0.3–1.2)
BILIRUBIN DIRECT: 0.1 MG/DL
BILIRUBIN, INDIRECT: 0.19 MG/DL (ref 0–1)
CHOLESTEROL/HDL RATIO: 5.6
CHOLESTEROL: 207 MG/DL
GLOBULIN: ABNORMAL G/DL (ref 1.5–3.8)
HDLC SERPL-MCNC: 37 MG/DL
LDL CHOLESTEROL: 138 MG/DL (ref 0–130)
TOTAL PROTEIN: 6.6 G/DL (ref 6.4–8.3)
TRIGL SERPL-MCNC: 159 MG/DL
VLDLC SERPL CALC-MCNC: ABNORMAL MG/DL (ref 1–30)

## 2020-05-15 PROCEDURE — 99214 OFFICE O/P EST MOD 30 MIN: CPT | Performed by: PHYSICIAN ASSISTANT

## 2020-05-15 PROCEDURE — 80076 HEPATIC FUNCTION PANEL: CPT

## 2020-05-15 PROCEDURE — 36415 COLL VENOUS BLD VENIPUNCTURE: CPT

## 2020-05-15 PROCEDURE — 80061 LIPID PANEL: CPT

## 2020-05-15 RX ORDER — PROMETHAZINE HYDROCHLORIDE 25 MG/1
25 TABLET ORAL 3 TIMES DAILY PRN
Qty: 30 TABLET | Refills: 0 | Status: SHIPPED | OUTPATIENT
Start: 2020-05-15 | End: 2020-05-22

## 2020-05-15 RX ORDER — PROMETHAZINE HYDROCHLORIDE 12.5 MG/1
12.5 TABLET ORAL EVERY 6 HOURS PRN
Qty: 90 TABLET | Refills: 0 | Status: SHIPPED | OUTPATIENT
Start: 2020-05-15 | End: 2020-05-15 | Stop reason: SDUPTHER

## 2020-05-15 RX ORDER — PROMETHAZINE HYDROCHLORIDE 12.5 MG/1
12.5 TABLET ORAL EVERY 6 HOURS PRN
Qty: 90 TABLET | Refills: 0 | Status: SHIPPED
Start: 2020-05-15 | End: 2020-05-15 | Stop reason: DRUGHIGH

## 2020-05-15 RX ORDER — PROMETHAZINE HYDROCHLORIDE 12.5 MG/1
12.5 TABLET ORAL 3 TIMES DAILY
COMMUNITY
End: 2020-05-15 | Stop reason: SDUPTHER

## 2020-05-15 ASSESSMENT — ENCOUNTER SYMPTOMS
BLOOD IN STOOL: 0
DIARRHEA: 1
NAUSEA: 1
ANAL BLEEDING: 0
CONSTIPATION: 0
VOMITING: 1
ABDOMINAL PAIN: 1
RESPIRATORY NEGATIVE: 1

## 2020-05-15 NOTE — PROGRESS NOTES
[address] [facility] PRIMARY CARE  [address] [facility] [address]  Dept: Marisabel Brito is a 31 y.o. female who presents today for her medical conditions/complaintsas noted below. Chief Complaint   Patient presents with    Other     Pt is here to discuss her MRI abdominal results. Pt states she went to see gastro. and was prescribed phenergan. Pt states that it helped a little and now her gastro wont call her back to refill it       HPI:     HPI  Saw S. Gay Baker MD for GI  He increased the Protonix 40mg to 1 po bid along with Pepcid qhs  No longer vomiting----always feels like she wants to vomit and has burning sensation in throat. Still dry heaving. No fever. Daily diarrhea for some months. NO blood in stool, no hemoptysis. Past GI, Dr. Kerri Vale, said she had IBS. She did have colonoscopy and EGD. Off Crestor for one week now due to elevated LFTs. Liver enzymes slowly increasing when she was on the Crestor. No muscle pain. LDL Cholesterol (mg/dL)   Date Value   05/15/2020 138 (H)   01/07/2020 162 (H)       (goal LDL is <100)   AST (U/L)   Date Value   05/15/2020 22     ALT (U/L)   Date Value   05/15/2020 31     BUN (mg/dL)   Date Value   04/29/2020 15     BP Readings from Last 3 Encounters:   05/15/20 122/82   04/29/20 132/88   02/19/20 112/80          (goal 120/80)    Past Medical History:   Diagnosis Date    Asthma     Gastroparesis     Headache     Hyperlipidemia     Hypertension     Irritable bowel syndrome     Scoliosis     has an S curve       Past Surgical History:   Procedure Laterality Date    ADENOIDECTOMY      CHOLECYSTECTOMY  2-1-2013    COLONOSCOPY      TONSILLECTOMY      TYMPANOSTOMY TUBE PLACEMENT         Family History   Problem Relation Age of Onset    Depression Mother    Sherita Kaur Other Mother         pancreatic issues.     High Blood Pressure Mother     High Cholesterol Mother        Social History     Tobacco Use Completed    Flu vaccine  Completed    Hepatitis A vaccine  Aged Out    Hib vaccine  Aged Out    Meningococcal (ACWY) vaccine  Aged Out       Subjective:      Review of Systems   Constitutional: Positive for appetite change. Negative for chills, diaphoresis, fever and unexpected weight change. Respiratory: Negative. Cardiovascular: Negative. Gastrointestinal: Positive for abdominal pain, diarrhea, nausea and vomiting (dry heaving). Negative for anal bleeding, blood in stool and constipation. Endocrine: Negative. Genitourinary: Negative. Psychiatric/Behavioral: Positive for agitation. Objective:     /82   Pulse 82   Wt 242 lb 9.6 oz (110 kg)   SpO2 98%   BMI 42.99 kg/m²   Physical Exam  Vitals signs and nursing note reviewed. Constitutional:       Appearance: She is obese. She is not ill-appearing. Cardiovascular:      Rate and Rhythm: Normal rate and regular rhythm. Heart sounds: No murmur. No friction rub. No gallop. Pulmonary:      Effort: Pulmonary effort is normal.      Breath sounds: Normal breath sounds. No wheezing, rhonchi or rales. Abdominal:      General: Abdomen is flat. Bowel sounds are normal. There is no distension. Palpations: There is no mass. Tenderness: There is generalized abdominal tenderness. There is no guarding or rebound. Hernia: No hernia is present. Neurological:      Mental Status: She is oriented to person, place, and time. Assessment:       Diagnosis Orders   1. Nausea  DISCONTINUED: promethazine (PHENERGAN) 12.5 MG tablet   2. Mixed hyperlipidemia  Lipid Panel   3. Elevated LFTs  Hepatic Function Panel   4. Diarrhea, unspecified type  O&P PANEL ([facility] [employer]) #1    Culture, Tissue        Plan:    Changed Zofran to Phenergan  Reviewed MRI of liver with her showing 3 lesions and fatty liver  Check LFTs and lipids with Crestor out of system.    Check a stool sample for infection  F/U with Dr. Rosy Lzooya, GI   Spent

## 2020-05-15 NOTE — TELEPHONE ENCOUNTER
Returned patient's message about a sooner appointment due to mri done showing spot on her liver per the patient and not feeling any better. Patient aware that 6/10/20 has been cancelled and moved to a doxy visit on 5/18/20 @ 2:45 pm.  Disclaimer read and accepted.

## 2020-05-18 ENCOUNTER — VIRTUAL VISIT (OUTPATIENT)
Dept: GASTROENTEROLOGY | Age: 30
End: 2020-05-18
Payer: COMMERCIAL

## 2020-05-18 PROCEDURE — 99443 PR PHYS/QHP TELEPHONE EVALUATION 21-30 MIN: CPT | Performed by: INTERNAL MEDICINE

## 2020-05-18 RX ORDER — BACLOFEN 10 MG/1
10 TABLET ORAL 3 TIMES DAILY PRN
Qty: 60 TABLET | Refills: 0 | Status: SHIPPED | OUTPATIENT
Start: 2020-05-18 | End: 2020-06-04 | Stop reason: ALTCHOICE

## 2020-05-18 ASSESSMENT — ENCOUNTER SYMPTOMS
NAUSEA: 1
TROUBLE SWALLOWING: 1
RESPIRATORY NEGATIVE: 1
EYES NEGATIVE: 1
CONSTIPATION: 1
ABDOMINAL PAIN: 1
DIARRHEA: 1

## 2020-05-18 NOTE — PROGRESS NOTES
Subjective:      Patient ID: Haley Laughlin is a 31 y.o. female. HPI    Review of Systems   Constitutional: Positive for fatigue. HENT: Positive for trouble swallowing (patient states that sometimes when she swallows it doesn't want to go down, it feels like a knot). Eyes: Negative. Respiratory: Negative. Cardiovascular: Negative. Gastrointestinal: Positive for abdominal pain, constipation, diarrhea and nausea. Endocrine: Negative. Genitourinary: Negative. Musculoskeletal: Negative. Skin: Negative. Allergic/Immunologic: Positive for food allergies. Neurological: Negative. Hematological: Negative. Psychiatric/Behavioral: Positive for sleep disturbance. All other systems reviewed and are negative.       Objective:   Physical Exam    Assessment:            Plan:

## 2020-05-19 ENCOUNTER — TELEPHONE (OUTPATIENT)
Dept: GASTROENTEROLOGY | Age: 30
End: 2020-05-19

## 2020-06-01 ENCOUNTER — TELEPHONE (OUTPATIENT)
Dept: GASTROENTEROLOGY | Age: 30
End: 2020-06-01

## 2020-06-03 ENCOUNTER — HOSPITAL ENCOUNTER (OUTPATIENT)
Facility: CLINIC | Age: 30
Setting detail: SPECIMEN
Discharge: HOME | End: 2020-06-03
Payer: COMMERCIAL

## 2020-06-03 LAB
Lab: NORMAL
MICRO OVA & PARASITES: NORMAL
SPECIMEN DESCRIPTION: NORMAL

## 2020-06-03 PROCEDURE — 87177 OVA AND PARASITES SMEARS: CPT

## 2020-06-03 PROCEDURE — 87328 CRYPTOSPORIDIUM AG IA: CPT

## 2020-06-03 PROCEDURE — 87209 SMEAR COMPLEX STAIN: CPT

## 2020-06-03 PROCEDURE — 87329 GIARDIA AG IA: CPT

## 2020-06-04 ENCOUNTER — OFFICE VISIT (OUTPATIENT)
Dept: GASTROENTEROLOGY | Age: 30
End: 2020-06-04
Payer: COMMERCIAL

## 2020-06-04 ENCOUNTER — TELEPHONE (OUTPATIENT)
Dept: GASTROENTEROLOGY | Age: 30
End: 2020-06-04

## 2020-06-04 ENCOUNTER — HOSPITAL ENCOUNTER (OUTPATIENT)
Facility: CLINIC | Age: 30
Discharge: HOME | End: 2020-06-04
Payer: COMMERCIAL

## 2020-06-04 ENCOUNTER — HOSPITAL ENCOUNTER (OUTPATIENT)
Dept: PREADMISSION TESTING | Age: 30
Discharge: HOME | End: 2020-06-08
Payer: COMMERCIAL

## 2020-06-04 VITALS — TEMPERATURE: 97.3 F | BODY MASS INDEX: 43.94 KG/M2 | RESPIRATION RATE: 20 BRPM | WEIGHT: 248 LBS

## 2020-06-04 LAB
DIRECT EXAM: NORMAL
DIRECT EXAM: NORMAL
Lab: NORMAL
SPECIMEN DESCRIPTION: NORMAL
VITAMIN B-12: 321 PG/ML (ref 232–1245)

## 2020-06-04 PROCEDURE — U0004 COV-19 TEST NON-CDC HGH THRU: HCPCS

## 2020-06-04 PROCEDURE — 82941 ASSAY OF GASTRIN: CPT

## 2020-06-04 PROCEDURE — 82607 VITAMIN B-12: CPT

## 2020-06-04 PROCEDURE — 99212 OFFICE O/P EST SF 10 MIN: CPT | Performed by: INTERNAL MEDICINE

## 2020-06-04 PROCEDURE — 36415 COLL VENOUS BLD VENIPUNCTURE: CPT

## 2020-06-04 RX ORDER — PROMETHAZINE HYDROCHLORIDE 12.5 MG/1
12.5 TABLET ORAL EVERY 6 HOURS PRN
COMMUNITY
End: 2020-06-04 | Stop reason: SDUPTHER

## 2020-06-04 RX ORDER — PROMETHAZINE HYDROCHLORIDE 12.5 MG/1
12.5 TABLET ORAL EVERY 6 HOURS PRN
Qty: 30 TABLET | Refills: 0 | Status: SHIPPED | OUTPATIENT
Start: 2020-06-04 | End: 2020-08-11 | Stop reason: SDUPTHER

## 2020-06-04 ASSESSMENT — ENCOUNTER SYMPTOMS
NAUSEA: 1
TROUBLE SWALLOWING: 1
DIARRHEA: 1
BLOOD IN STOOL: 1
EYES NEGATIVE: 1
ABDOMINAL PAIN: 1
RESPIRATORY NEGATIVE: 1

## 2020-06-04 NOTE — PROGRESS NOTES
GI FOLLOW UP    INTERVAL HISTORY:       No referring provider defined for this encounter. Chief Complaint   Patient presents with    Nausea     Patient states that she is extremely nauseous. All of the time. She doesn't think its related to anything and states that it comes with the burning in her throat/feels like she will throw up. She states feeling a bubbling sensation or lump in her throat.  Dysphagia     Patient states that pills get stuck in her throat. She feels like she has to eat something solid to get it to go down.  Abdominal Pain     Severe pain in her upper middle quadrant. She said in the middle it burns. The new pain she has on the right side is stabbing and intense.  Diarrhea     Patient states that she has loose stools all of the time, and in last week it has gotten more solid (but its still loose). When she has to have a bowel movement its typically urgent.  Rectal Bleeding     Patient states that her stools were dark but she already did a stool sample           HISTORY OF PRESENT ILLNESS: Guille Sellers a 31 y. o. female with a past history remarkable for morbid obesity, dysmenorrhea, chronic GERD per patient x 5 yrs, referred for evaluation of for refractory heartburn and dyspepsia symptoms.  Patient was previously followed with Dr. Antonio gilliland at [facility], underwent an EGD colonoscopy to identify no colonic sources of her symptoms.  EGD disclosed what appears to be fundic gland polyps, no other findings were mentioned, records are being obtained for further detail.     Possible history of gastroparesis  Had emptying study that was reported to be unremarkable.    Lower abdominal cramping is reported by patient, appear to be unrelated to bowel movements  Prior history CCY  Diverticulosis per recent CT scan  Dysmenorrhea, not fully addressed by OB/GYN, she went for a period of amenorrhea for 9 months but currently has some spotting.  This may reflect some dysregulation of her hormones. Self reported history of insulin resistance without evidence of diabetes     Since last visit which was May 5, 2020 patient was placed on PPI 40 mg twice daily and has reached no resolve in her symptoms however has reported some improvement in her persistent nausea and emesis with the use of Phenergan. She also now reports symptoms of \"foreign body sensation\" in her throat and chest which appears to be new. Denies any obvious dysphagia or any weight changes.     Her celiac panel was negative  Her food comprehensive panel was negative  She continues to have a pending stool studies  TSH was unremarkable  Repeat LFTs shows normalization of her transaminases    Past Medical,Family, and Social History reviewed and does contribute to the patient presenting condition. Patient's PMH/PSH,SH,PSYCH Hx, MEDs, ALLERGIES, and ROS were all reviewed and updated in the appropriate sections.     PAST MEDICAL HISTORY:  Past Medical History:   Diagnosis Date    Asthma     Gastroparesis     Headache     Hyperlipidemia     Hypertension     Irritable bowel syndrome     Scoliosis     has an S curve        Past Surgical History:   Procedure Laterality Date    ADENOIDECTOMY      CHOLECYSTECTOMY  2-1-2013    COLONOSCOPY      TONSILLECTOMY      TYMPANOSTOMY TUBE PLACEMENT         CURRENT MEDICATIONS:    Current Outpatient Medications:     promethazine (PHENERGAN) 12.5 MG tablet, Take 1 tablet by mouth every 6 hours as needed for Nausea, Disp: 30 tablet, Rfl: 0    baclofen (LIORESAL) 10 MG tablet, Take 1 tablet by mouth 3 times daily as needed (dyspepsia and reflux), Disp: 60 tablet, Rfl: 0    pantoprazole (PROTONIX) 40 MG tablet, Take 1 tablet by mouth 2 times daily (Patient taking differently: Take 40 mg by mouth daily ), Disp: 180 tablet, Rfl: 2    ALBUTEROL IN, Inhale into the lungs, Disp: , Rfl:     diphenhydrAMINE (BENADRYL ALLERGY) 25 MG tablet, Take 50 mg by mouth every 6 hours as needed for Itching, Disp: , Rfl:     sucralfate (CARAFATE) 1 GM tablet, Take 1 tablet by mouth 4 times daily, Disp: 120 tablet, Rfl: 3    hydrocortisone (ANUSOL-HC) 2.5 % CREA rectal cream, Apply to rectum bid x 3 days then PRN for flare of hemorrhoids, Disp: 1 Tube, Rfl: 1    EPINEPHrine HCl, Anaphylaxis, (EPIPEN 2-ANNABELLE IM), Inject into the muscle, Disp: , Rfl:     Multiple Vitamins-Minerals (CENTRUM MULTIGUMMIES) CHEW, Take by mouth, Disp: , Rfl:     Cetirizine HCl 10 MG CAPS, Take by mouth, Disp: , Rfl:     montelukast (SINGULAIR) 10 MG tablet, Take 10 mg by mouth nightly, Disp: , Rfl:     atenolol (TENORMIN) 25 MG tablet, Take 25 mg by mouth daily, Disp: , Rfl:     famotidine (PEPCID) 40 MG tablet, Take 40 mg by mouth daily, Disp: , Rfl:     Cholecalciferol (VITAMIN D3 PO), Take by mouth 3x per week, Disp: , Rfl:     ALLERGIES:   Allergies   Allergen Reactions    Latex Rash    Avocado Anaphylaxis    Banana Anaphylaxis    Menthol (Topical Analgesic) [Menthol (Topical Analgesic)] Other (See Comments)     Burning and rash    Sudafed [Pseudoephedrine Hcl]     Augmentin [Amoxicillin-Pot Clavulanate] Nausea And Vomiting    Tape Steve Ruby Tape] Rash       FAMILY HISTORY:       Problem Relation Age of Onset    Depression Mother    Jarek Boudreaux Other Mother         pancreatic issues.     High Blood Pressure Mother     High Cholesterol Mother          SOCIAL HISTORY:   Social History     Socioeconomic History    Marital status: Single     Spouse name: Not on file    Number of children: Not on file    Years of education: Not on file    Highest education level: Not on file   Occupational History    Not on file   Social Needs    Financial resource strain: Not on file    Food insecurity     Worry: Not on file     Inability: Not on file    Transportation needs     Medical: Not on file     Non-medical: Not on file Tobacco Use    Smoking status: Never Smoker    Smokeless tobacco: Never Used   Substance and Sexual Activity    Alcohol use: Not Currently     Comment: occa    Drug use: No    Sexual activity: Yes     Partners: Male   Lifestyle    Physical activity     Days per week: Not on file     Minutes per session: Not on file    Stress: Not on file   Relationships    Social connections     Talks on phone: Not on file     Gets together: Not on file     Attends Sikhism service: Not on file     Active member of club or organization: Not on file     Attends meetings of clubs or organizations: Not on file     Relationship status: Not on file    Intimate partner violence     Fear of current or ex partner: Not on file     Emotionally abused: Not on file     Physically abused: Not on file     Forced sexual activity: Not on file   Other Topics Concern    Not on file   Social History Narrative    Not on file       REVIEW OF SYSTEMS: A 12-point review of systems was obtained and pertinent positives and negatives were listed below. REVIEW OF SYSTEMS:     Constitutional: No fever, no chills, no lethargy, no weakness. HEENT:  No headache, otalgia, itchy eyes, nasal discharge or sore throat. Cardiac:  No chest pain, dyspnea, orthopnea or PND. Chest:   No cough, phlegm or wheezing. Abdomen:      Detailed by MA   Neuro:  No focal weakness, abnormal movements or seizure like activity. Skin:   No rashes, no itching. :   No hematuria, no pyuria, no dysuria, no flank pain. Extremities:  No swelling or joint pains. ROS was otherwise negative    Review of Systems    PHYSICAL EXAMINATION: Vital signs reviewed per the nursing documentation. Temp 97.3 °F (36.3 °C)   Resp 20   Wt 248 lb (112.5 kg)   BMI 43.94 kg/m²   Body mass index is 43.94 kg/m². Physical Exam    Physical Exam   Constitutional: Patient is oriented to person, place, and time. Patient appears well-developed and well-nourished.    HENT:   Head: Normocephalic and atraumatic. Eyes: Pupils are equal, round, and reactive to light. EOM are normal.   Neck: Normal range of motion. Neck supple. No JVD present. No tracheal deviation present. No thyromegaly present. Cardiovascular: Normal rate, regular rhythm, normal heart sounds and intact distal pulses. Pulmonary/Chest: Effort normal and breath sounds normal. No stridor. No respiratory distress. He has no wheezes. He has no rales. He exhibits no tenderness. Abdominal: Soft. Bowel sounds are normal. He exhibits no distension and no mass. There is no tenderness. There is no rebound and no guarding. No hernia. Musculoskeletal: Normal range of motion. Lymphadenopathy:    Patient has no cervical adenopathy. Neurological: Patient is alert and oriented to person, place, and time. Psychiatric: Patient has a normal mood and affect.  Patient behavior is normal.       LABORATORY DATA: Reviewed  Lab Results   Component Value Date    WBC 7.0 01/07/2020    HGB 13.8 01/07/2020    HCT 41.6 01/07/2020    MCV 97.7 01/07/2020     01/07/2020     04/29/2020    K 4.0 04/29/2020     04/29/2020    CO2 23 04/29/2020    BUN 15 04/29/2020    CREATININE 0.68 04/29/2020    LABPROT 6.7 01/28/2013    LABALBU 4.0 05/15/2020    BILITOT 0.29 (L) 05/15/2020    ALKPHOS 56 05/15/2020    AST 22 05/15/2020    ALT 31 05/15/2020         Lab Results   Component Value Date    RBC 4.26 01/07/2020    HGB 13.8 01/07/2020    MCV 97.7 01/07/2020    MCH 32.4 01/07/2020    MCHC 33.2 01/07/2020    RDW 12.3 01/07/2020    MPV 10.6 01/07/2020    BASOPCT 1 01/07/2020    LYMPHSABS 2.59 01/07/2020    MONOSABS 0.74 01/07/2020    NEUTROABS 3.53 01/07/2020    EOSABS 0.12 01/07/2020    BASOSABS 0.04 01/07/2020         DIAGNOSTIC TESTING:     Mri Abdomen W Wo Contrast    Result Date: 5/12/2020  EXAMINATION: MRI OF THE ABDOMEN WITHOUT AND WITH CONTRAST, 5/12/2020 12:41 pm TECHNIQUE: Multiplanar multisequence MRI of the abdomen was performed

## 2020-06-05 LAB
SARS-COV-2, PCR: NOT DETECTED
SARS-COV-2, RAPID: NORMAL
SARS-COV-2: NORMAL
SOURCE: NORMAL

## 2020-06-06 ENCOUNTER — TELEPHONE (OUTPATIENT)
Dept: PRIMARY CARE CLINIC | Age: 30
End: 2020-06-06

## 2020-06-07 LAB — GASTRIN: 29 PG/ML (ref 0–100)

## 2020-06-08 ENCOUNTER — ANESTHESIA EVENT (OUTPATIENT)
Dept: ENDOSCOPY | Age: 30
End: 2020-06-08
Payer: COMMERCIAL

## 2020-06-08 ENCOUNTER — ANESTHESIA (OUTPATIENT)
Dept: ENDOSCOPY | Age: 30
End: 2020-06-08
Payer: COMMERCIAL

## 2020-06-08 ENCOUNTER — HOSPITAL ENCOUNTER (OUTPATIENT)
Age: 30
Setting detail: OUTPATIENT SURGERY
Discharge: HOME | End: 2020-06-08
Attending: INTERNAL MEDICINE | Admitting: INTERNAL MEDICINE
Payer: COMMERCIAL

## 2020-06-08 VITALS
WEIGHT: 240 LBS | DIASTOLIC BLOOD PRESSURE: 56 MMHG | BODY MASS INDEX: 42.52 KG/M2 | OXYGEN SATURATION: 98 % | SYSTOLIC BLOOD PRESSURE: 108 MMHG | HEIGHT: 63 IN | HEART RATE: 94 BPM | TEMPERATURE: 97.2 F | RESPIRATION RATE: 22 BRPM

## 2020-06-08 VITALS
RESPIRATION RATE: 18 BRPM | DIASTOLIC BLOOD PRESSURE: 59 MMHG | SYSTOLIC BLOOD PRESSURE: 117 MMHG | OXYGEN SATURATION: 98 %

## 2020-06-08 PROCEDURE — 3700000000 HC ANESTHESIA ATTENDED CARE: Performed by: INTERNAL MEDICINE

## 2020-06-08 PROCEDURE — 81025 URINE PREGNANCY TEST: CPT

## 2020-06-08 PROCEDURE — 7100000011 HC PHASE II RECOVERY - ADDTL 15 MIN: Performed by: INTERNAL MEDICINE

## 2020-06-08 PROCEDURE — 2580000003 HC RX 258: Performed by: INTERNAL MEDICINE

## 2020-06-08 PROCEDURE — 43239 EGD BIOPSY SINGLE/MULTIPLE: CPT | Performed by: INTERNAL MEDICINE

## 2020-06-08 PROCEDURE — 6360000002 HC RX W HCPCS: Performed by: NURSE ANESTHETIST, CERTIFIED REGISTERED

## 2020-06-08 PROCEDURE — 2709999900 HC NON-CHARGEABLE SUPPLY: Performed by: INTERNAL MEDICINE

## 2020-06-08 PROCEDURE — 6360000002 HC RX W HCPCS: Performed by: ANESTHESIOLOGY

## 2020-06-08 PROCEDURE — 3700000001 HC ADD 15 MINUTES (ANESTHESIA): Performed by: INTERNAL MEDICINE

## 2020-06-08 PROCEDURE — 2500000003 HC RX 250 WO HCPCS: Performed by: NURSE ANESTHETIST, CERTIFIED REGISTERED

## 2020-06-08 PROCEDURE — 7100000010 HC PHASE II RECOVERY - FIRST 15 MIN: Performed by: INTERNAL MEDICINE

## 2020-06-08 PROCEDURE — 3609012400 HC EGD TRANSORAL BIOPSY SINGLE/MULTIPLE: Performed by: INTERNAL MEDICINE

## 2020-06-08 PROCEDURE — 88305 TISSUE EXAM BY PATHOLOGIST: CPT

## 2020-06-08 RX ORDER — GLYCOPYRROLATE 1 MG/5 ML
SYRINGE (ML) INTRAVENOUS PRN
Status: DISCONTINUED | OUTPATIENT
Start: 2020-06-08 | End: 2020-06-08 | Stop reason: SDUPTHER

## 2020-06-08 RX ORDER — SODIUM CHLORIDE 9 MG/ML
INJECTION, SOLUTION INTRAVENOUS CONTINUOUS
Status: DISCONTINUED | OUTPATIENT
Start: 2020-06-08 | End: 2020-06-08 | Stop reason: HOSPADM

## 2020-06-08 RX ORDER — LIDOCAINE HYDROCHLORIDE 10 MG/ML
INJECTION, SOLUTION EPIDURAL; INFILTRATION; INTRACAUDAL; PERINEURAL PRN
Status: DISCONTINUED | OUTPATIENT
Start: 2020-06-08 | End: 2020-06-08 | Stop reason: SDUPTHER

## 2020-06-08 RX ORDER — PROPOFOL 10 MG/ML
INJECTION, EMULSION INTRAVENOUS PRN
Status: DISCONTINUED | OUTPATIENT
Start: 2020-06-08 | End: 2020-06-08 | Stop reason: SDUPTHER

## 2020-06-08 RX ORDER — ONDANSETRON 2 MG/ML
4 INJECTION INTRAMUSCULAR; INTRAVENOUS ONCE
Status: COMPLETED | OUTPATIENT
Start: 2020-06-08 | End: 2020-06-08

## 2020-06-08 RX ORDER — MIDAZOLAM HYDROCHLORIDE 1 MG/ML
1 INJECTION INTRAMUSCULAR; INTRAVENOUS EVERY 10 MIN PRN
Status: DISCONTINUED | OUTPATIENT
Start: 2020-06-08 | End: 2020-06-08 | Stop reason: HOSPADM

## 2020-06-08 RX ORDER — PROMETHAZINE HYDROCHLORIDE 25 MG/ML
12.5 INJECTION, SOLUTION INTRAMUSCULAR; INTRAVENOUS ONCE
Status: COMPLETED | OUTPATIENT
Start: 2020-06-08 | End: 2020-06-08

## 2020-06-08 RX ORDER — SODIUM CHLORIDE, SODIUM LACTATE, POTASSIUM CHLORIDE, CALCIUM CHLORIDE 600; 310; 30; 20 MG/100ML; MG/100ML; MG/100ML; MG/100ML
INJECTION, SOLUTION INTRAVENOUS CONTINUOUS
Status: DISCONTINUED | OUTPATIENT
Start: 2020-06-08 | End: 2020-06-08 | Stop reason: HOSPADM

## 2020-06-08 RX ADMIN — LIDOCAINE HYDROCHLORIDE 25 MG: 10 INJECTION, SOLUTION EPIDURAL; INFILTRATION; INTRACAUDAL; PERINEURAL at 11:54

## 2020-06-08 RX ADMIN — SODIUM CHLORIDE: 9 INJECTION, SOLUTION INTRAVENOUS at 10:53

## 2020-06-08 RX ADMIN — ONDANSETRON 4 MG: 2 INJECTION INTRAMUSCULAR; INTRAVENOUS at 11:04

## 2020-06-08 RX ADMIN — PROPOFOL 300 MG: 10 INJECTION, EMULSION INTRAVENOUS at 11:54

## 2020-06-08 RX ADMIN — PROMETHAZINE HYDROCHLORIDE 12.5 MG: 25 INJECTION INTRAMUSCULAR; INTRAVENOUS at 11:47

## 2020-06-08 RX ADMIN — Medication 0.2 MG: at 11:54

## 2020-06-08 ASSESSMENT — PAIN - FUNCTIONAL ASSESSMENT: PAIN_FUNCTIONAL_ASSESSMENT: 0-10

## 2020-06-08 ASSESSMENT — PAIN SCALES - GENERAL
PAINLEVEL_OUTOF10: 4
PAINLEVEL_OUTOF10: 4
PAINLEVEL_OUTOF10: 2

## 2020-06-08 ASSESSMENT — PAIN DESCRIPTION - LOCATION
LOCATION: ABDOMEN;THROAT
LOCATION: ABDOMEN;THROAT

## 2020-06-08 ASSESSMENT — PAIN DESCRIPTION - DESCRIPTORS: DESCRIPTORS: ACHING;CONSTANT

## 2020-06-08 ASSESSMENT — PAIN DESCRIPTION - PAIN TYPE
TYPE: ACUTE PAIN
TYPE: ACUTE PAIN

## 2020-06-08 NOTE — ANESTHESIA PRE PROCEDURE
hypertension:,                   Neuro/Psych:   (+) headaches:,             GI/Hepatic/Renal:   (+) GERD:, liver disease:, morbid obesity          Endo/Other:                     Abdominal:   (+) obese,         Vascular:                                        Anesthesia Plan      MAC     ASA 3       Induction: intravenous. MIPS: Prophylactic antiemetics administered. Anesthetic plan and risks discussed with patient. Plan discussed with CRNA.                   Yrn Medina MD   6/8/2020

## 2020-06-09 LAB
HCG, PREGNANCY URINE (POC): NEGATIVE
SURGICAL PATHOLOGY REPORT: NORMAL

## 2020-06-23 ENCOUNTER — HOSPITAL ENCOUNTER (OUTPATIENT)
Age: 30
Setting detail: SPECIMEN
Discharge: HOME | End: 2020-06-23
Payer: COMMERCIAL

## 2020-06-23 ENCOUNTER — OFFICE VISIT (OUTPATIENT)
Dept: OBGYN CLINIC | Age: 30
End: 2020-06-23
Payer: COMMERCIAL

## 2020-06-23 ENCOUNTER — TELEPHONE (OUTPATIENT)
Dept: GASTROENTEROLOGY | Age: 30
End: 2020-06-23

## 2020-06-23 VITALS
DIASTOLIC BLOOD PRESSURE: 68 MMHG | BODY MASS INDEX: 43.23 KG/M2 | HEIGHT: 63 IN | SYSTOLIC BLOOD PRESSURE: 94 MMHG | WEIGHT: 244 LBS

## 2020-06-23 LAB
ESTRADIOL LEVEL: 48 PG/ML (ref 27–314)
PROLACTIN: 6.4 UG/L (ref 4.79–23.3)

## 2020-06-23 PROCEDURE — 99203 OFFICE O/P NEW LOW 30 MIN: CPT | Performed by: NURSE PRACTITIONER

## 2020-06-23 ASSESSMENT — ENCOUNTER SYMPTOMS
DIARRHEA: 0
ABDOMINAL PAIN: 0
COUGH: 0
BACK PAIN: 0
SHORTNESS OF BREATH: 0
ABDOMINAL DISTENTION: 0
CONSTIPATION: 0

## 2020-06-23 NOTE — PROGRESS NOTES
Subjective:      Patient ID: Christi Aguilar is being seen today for   Chief Complaint   Patient presents with   Paola Marie Establish Care     last pap 1/15/18-WNL     Menstrual Problem     irregular cycles - no menses May 2019        HPI  Here as a new pt and to discuss irregular cycles- had a cycle last June(was light). Had only some light spotting in April that was brief. Denies having any cyclic symptoms. Has a hx of HTN, stomach issues- had 3 adenomas on her liver- was told she should not take any estrogen products. She is seeing a specialist at the [facility] in July. Getting  in October. Review of Systems   Constitutional: Negative for appetite change and fatigue. HENT: Negative for congestion and hearing loss. Eyes: Negative for visual disturbance. Respiratory: Negative for cough and shortness of breath. Cardiovascular: Negative for chest pain and palpitations. Gastrointestinal: Negative for abdominal distention, abdominal pain, constipation and diarrhea. Genitourinary: Negative for flank pain, frequency, menstrual problem, pelvic pain and vaginal discharge. Musculoskeletal: Negative for back pain. Neurological: Negative for syncope and headaches. Psychiatric/Behavioral: Negative for behavioral problems.        Vitals:    06/23/20 1317   BP: 94/68   Site: Right Upper Arm   Position: Sitting   Cuff Size: Large Adult   Weight: 244 lb (110.7 kg)   Height: 5' 3\" (1.6 m)     Current Outpatient Medications   Medication Sig Dispense Refill    BACLOFEN PO Take by mouth      promethazine (PHENERGAN) 12.5 MG tablet Take 1 tablet by mouth every 6 hours as needed for Nausea 30 tablet 0    pantoprazole (PROTONIX) 40 MG tablet Take 1 tablet by mouth 2 times daily (Patient taking differently: Take 40 mg by mouth daily ) 180 tablet 2    ALBUTEROL IN Inhale into the lungs      diphenhydrAMINE (BENADRYL ALLERGY) 25 MG tablet Take 50 mg by mouth every 6 hours as needed for Itching     

## 2020-06-23 NOTE — TELEPHONE ENCOUNTER
Patient called the office stating that she had an upper GI and was supposed to be referred to [facility] for her liver. I did not see that this was sent. Per last office notes 2) hepatic adenomas-we will send to [facility], [facility] clinic for evaluation of these hepatic adenomas as they may be symptomatic or to the patient. External referral provided. Please follow up and contact the patient once completed. Thank you.

## 2020-07-10 ENCOUNTER — PROCEDURE VISIT (OUTPATIENT)
Dept: OBGYN CLINIC | Age: 30
End: 2020-07-10
Payer: COMMERCIAL

## 2020-07-10 PROCEDURE — 76830 TRANSVAGINAL US NON-OB: CPT | Performed by: OBSTETRICS & GYNECOLOGY

## 2020-07-23 ENCOUNTER — OFFICE VISIT (OUTPATIENT)
Dept: OBGYN CLINIC | Age: 30
End: 2020-07-23
Payer: COMMERCIAL

## 2020-07-23 ENCOUNTER — HOSPITAL ENCOUNTER (OUTPATIENT)
Age: 30
Setting detail: SPECIMEN
Discharge: HOME | End: 2020-07-23
Payer: COMMERCIAL

## 2020-07-23 VITALS
SYSTOLIC BLOOD PRESSURE: 112 MMHG | BODY MASS INDEX: 41.64 KG/M2 | DIASTOLIC BLOOD PRESSURE: 80 MMHG | HEIGHT: 63 IN | WEIGHT: 235 LBS

## 2020-07-23 PROCEDURE — 99395 PREV VISIT EST AGE 18-39: CPT | Performed by: NURSE PRACTITIONER

## 2020-07-23 ASSESSMENT — ENCOUNTER SYMPTOMS
ABDOMINAL DISTENTION: 0
ABDOMINAL PAIN: 0
CONSTIPATION: 0
DIARRHEA: 0
SHORTNESS OF BREATH: 0
BACK PAIN: 0
COUGH: 0

## 2020-07-23 NOTE — PROGRESS NOTES
HPI:     Erinn Heard a 31 y.o. female who presents today for:  Chief Complaint   Patient presents with    Annual Exam     last pap 1-15-18 neg     Amenorrhea     13 months since period        HPI  Here for annual exam. Has not been having regular cycles for a couple of years. Was seen at the office for a hx of ovarian cysts. has hx of HTN, stomach issues and has 3 adenomas on her liver- seeing MD at the [facility] Discussed options for tx of assumed PCOS- pt wants to see endocrinology. Had some spotting in April, but prior to that, had been 8 months. Works as a nurse at the [employer]. No children. Working on eating healthy eating and trying to exercise. Discussed ordering labs- pt will call back if she would like to have me order  GYNECOLOGICHISTORY:  MenstrualHistory: Patient's last menstrual period was 06/23/2019 (approximate). Sexually Transmitted Infections: No  History of Ectopic Pregnancy:No  Menarche:14  Regular cycles until June of last year (had been on OCP's since 13 y/o.   Cycles See above  Durationof cycles:  Dysmenorrhea: not  Sexually active: yes  Dyspareunia: uses extra lubricant    Current Outpatient Medications   Medication Sig Dispense Refill    BACLOFEN PO Take by mouth      promethazine (PHENERGAN) 12.5 MG tablet Take 1 tablet by mouth every 6 hours as needed for Nausea 30 tablet 0    pantoprazole (PROTONIX) 40 MG tablet Take 1 tablet by mouth 2 times daily (Patient taking differently: Take 40 mg by mouth daily ) 180 tablet 2    ALBUTEROL IN Inhale into the lungs      diphenhydrAMINE (BENADRYL ALLERGY) 25 MG tablet Take 50 mg by mouth every 6 hours as needed for Itching      sucralfate (CARAFATE) 1 GM tablet Take 1 tablet by mouth 4 times daily 120 tablet 3    hydrocortisone (ANUSOL-HC) 2.5 % CREA rectal cream Apply to rectum bid x 3 days then PRN for flare of hemorrhoids 1 Tube 1    EPINEPHrine HCl, Anaphylaxis, (EPIPEN 2-ANNABELLE IM) Inject into the muscle      Multiple Vitamins-Minerals (CENTRUM MULTIGUMMIES) CHEW Take by mouth      Cetirizine HCl 10 MG CAPS Take by mouth      montelukast (SINGULAIR) 10 MG tablet Take 10 mg by mouth nightly      atenolol (TENORMIN) 25 MG tablet Take 25 mg by mouth daily      famotidine (PEPCID) 40 MG tablet Take 40 mg by mouth daily      Cholecalciferol (VITAMIN D3 PO) Take by mouth daily       No current facility-administered medications for this visit. Allergies   Allergen Reactions    Latex Rash    Avocado Anaphylaxis    Banana Anaphylaxis    Menthol (Topical Analgesic) [Menthol (Topical Analgesic)] Other (See Comments)     Burning and rash    Sudafed [Pseudoephedrine Hcl]     Augmentin [Amoxicillin-Pot Clavulanate] Nausea And Vomiting    Tape Marguerite Hosteller Tape] Rash       Past Medical History:   Diagnosis Date    Asthma     Gastroparesis     GERD (gastroesophageal reflux disease)     Headache     Hepatic adenoma     has [facility] to [facility] Hyperlipidemia     Hypertension     Insulin resistance     Irritable bowel syndrome     Scoliosis     has an S curve     Wears glasses      Denies family history of breast, ovarian, uterus, colon CA     Past Surgical History:   Procedure Laterality Date    ADENOIDECTOMY      BRONCHOSCOPY      CHOLECYSTECTOMY  2-1-2013    COLONOSCOPY      SHOULDER SURGERY Left     bone spur    TONSILLECTOMY      TYMPANOSTOMY TUBE PLACEMENT      UPPER GASTROINTESTINAL ENDOSCOPY N/A 6/8/2020    EGD BIOPSY performed by Selene Porras MD at [facility]     Family History   Problem Relation Age of Onset    Depression Mother    Lechuga Can Other Mother         pancreatic issues.     High Blood Pressure Mother     High Cholesterol Mother      Social History     Tobacco Use    Smoking status: Never Smoker    Smokeless tobacco: Never Used   Substance Use Topics    Alcohol use: Not Currently        Subjective:      Review of Systems   Constitutional: Negative for appetite change and Musculoskeletal:         General: No tenderness. Lymphadenopathy:      Cervical: No cervical adenopathy. Skin:     General: Skin is warm and dry. Neurological:      Mental Status: She is alert and oriented to person, place, and time. Psychiatric:         Behavior: Behavior normal.         Thought Content: Thought content normal.         Judgment: Judgment normal.           Assessment:     1. Women's annual routine gynecological examination          Plan:   1. Pap collected. Discussed new pap smear guidelines. Desires re-pap in 1 year. 2. Breast self exam reviewed  3. Calcium and Vitamin D dosing reviewed. 4. Seat belt use reviewed  5. Family planning reviewed.   Birth control condoms  Pt will call back to let me know if she is pursuing care w/endocrinology (including their recommendation for POP's) or if she wants me to order labs and strt her on POP's  Electronicallysigned by Gwen Kimble on 7/23/2020

## 2020-07-30 LAB — CYTOLOGY REPORT: NORMAL

## 2020-08-11 ENCOUNTER — OFFICE VISIT (OUTPATIENT)
Dept: GASTROENTEROLOGY | Age: 30
End: 2020-08-11
Payer: COMMERCIAL

## 2020-08-11 VITALS — TEMPERATURE: 97.3 F | RESPIRATION RATE: 18 BRPM | WEIGHT: 241 LBS | BODY MASS INDEX: 42.69 KG/M2

## 2020-08-11 PROCEDURE — 99213 OFFICE O/P EST LOW 20 MIN: CPT | Performed by: INTERNAL MEDICINE

## 2020-08-11 RX ORDER — BACLOFEN 10 MG/1
10 TABLET ORAL 3 TIMES DAILY
Qty: 90 TABLET | Refills: 2 | Status: SHIPPED | OUTPATIENT
Start: 2020-08-11 | End: 2022-04-18 | Stop reason: SDUPTHER

## 2020-08-11 RX ORDER — PROMETHAZINE HYDROCHLORIDE 12.5 MG/1
12.5 TABLET ORAL EVERY 6 HOURS PRN
Qty: 30 TABLET | Refills: 0 | Status: SHIPPED | OUTPATIENT
Start: 2020-08-11 | End: 2020-09-08 | Stop reason: SDUPTHER

## 2020-08-11 ASSESSMENT — ENCOUNTER SYMPTOMS
NAUSEA: 1
ABDOMINAL PAIN: 1
RESPIRATORY NEGATIVE: 1
DIARRHEA: 1
EYES NEGATIVE: 1

## 2020-08-11 NOTE — PROGRESS NOTES
appears to be new.  Denies any obvious dysphagia or any weight changes.     Her celiac panel was negative  Her food comprehensive panel was negative  She continues to have a pending stool studies  TSH was unremarkable  Repeat LFTs shows normalization of her transaminases    Past Medical,Family, and Social History reviewed and does contribute to the patient presenting condition. Patient's PMH/PSH,SH,PSYCH Hx, MEDs, ALLERGIES, and ROS were all reviewed and updated in the appropriate sections.     PAST MEDICAL HISTORY:  Past Medical History:   Diagnosis Date    Asthma     Gastroparesis     GERD (gastroesophageal reflux disease)     Headache     Hepatic adenoma     has referral to [facility] Hyperlipidemia     Hypertension     Insulin resistance     Irritable bowel syndrome     Scoliosis     has an S curve     Wears glasses        Past Surgical History:   Procedure Laterality Date    ADENOIDECTOMY      BRONCHOSCOPY      CHOLECYSTECTOMY  2-1-2013    COLONOSCOPY      SHOULDER SURGERY Left     bone spur    TONSILLECTOMY      TYMPANOSTOMY TUBE PLACEMENT      UPPER GASTROINTESTINAL ENDOSCOPY N/A 6/8/2020    EGD BIOPSY performed by Debbi Stroud MD at [facility]       CURRENT MEDICATIONS:    Current Outpatient Medications:     baclofen (LIORESAL) 10 MG tablet, Take 1 tablet by mouth 3 times daily, Disp: 90 tablet, Rfl: 2    promethazine (PHENERGAN) 12.5 MG tablet, Take 1 tablet by mouth every 6 hours as needed for Nausea, Disp: 30 tablet, Rfl: 0    pantoprazole (PROTONIX) 40 MG tablet, Take 1 tablet by mouth 2 times daily (Patient taking differently: Take 40 mg by mouth daily ), Disp: 180 tablet, Rfl: 2    ALBUTEROL IN, Inhale into the lungs, Disp: , Rfl:     diphenhydrAMINE (BENADRYL ALLERGY) 25 MG tablet, Take 50 mg by mouth every 6 hours as needed for Itching , Disp: , Rfl:     hydrocortisone (ANUSOL-HC) 2.5 % CREA rectal cream, Apply to rectum bid x 3 days then PRN for flare of hemorrhoids, Disp: 1 Tube, Rfl: 1    EPINEPHrine HCl, Anaphylaxis, (EPIPEN 2-ANNABELLE IM), Inject into the muscle, Disp: , Rfl:     Multiple Vitamins-Minerals (CENTRUM MULTIGUMMIES) CHEW, Take by mouth, Disp: , Rfl:     Cetirizine HCl 10 MG CAPS, Take by mouth, Disp: , Rfl:     montelukast (SINGULAIR) 10 MG tablet, Take 10 mg by mouth nightly, Disp: , Rfl:     atenolol (TENORMIN) 25 MG tablet, Take 25 mg by mouth daily, Disp: , Rfl:     famotidine (PEPCID) 40 MG tablet, Take 40 mg by mouth daily, Disp: , Rfl:     Cholecalciferol (VITAMIN D3 PO), Take by mouth daily, Disp: , Rfl:     ALLERGIES:   Allergies   Allergen Reactions    Latex Rash    Avocado Anaphylaxis    Banana Anaphylaxis    Menthol (Topical Analgesic) [Menthol (Topical Analgesic)] Other (See Comments)     Burning and rash    Sudafed [Pseudoephedrine Hcl]     Augmentin [Amoxicillin-Pot Clavulanate] Nausea And Vomiting    Tape Sarah Gallus Tape] Rash       FAMILY HISTORY:       Problem Relation Age of Onset    Depression Mother    Madlyn Guard Other Mother         pancreatic issues.     High Blood Pressure Mother     High Cholesterol Mother     No Known Problems Father     Other Maternal Grandmother         vascular disease    High Blood Pressure Maternal Grandmother     High Cholesterol Maternal Grandmother     Diabetes Maternal Grandfather     High Cholesterol Maternal Grandfather     No Known Problems Paternal Grandmother     No Known Problems Paternal Grandfather     Other Sister         melonoma    Depression Sister     Anxiety Disorder Sister     Cervical Cancer Maternal Aunt 21         SOCIAL HISTORY:   Social History     Socioeconomic History    Marital status: Single     Spouse name: Not on file    Number of children: Not on file    Years of education: Not on file    Highest education level: Not on file   Occupational History    Not on file   Social Needs    Financial resource strain: Not on file    Food insecurity     Worry: Not on file     Inability: Not on file    Transportation needs     Medical: Not on file     Non-medical: Not on file   Tobacco Use    Smoking status: Never Smoker    Smokeless tobacco: Never Used   Substance and Sexual Activity    Alcohol use: Not Currently    Drug use: No    Sexual activity: Yes     Partners: Male   Lifestyle    Physical activity     Days per week: Not on file     Minutes per session: Not on file    Stress: Not on file   Relationships    Social connections     Talks on phone: Not on file     Gets together: Not on file     Attends Christian service: Not on file     Active member of club or organization: Not on file     Attends meetings of clubs or organizations: Not on file     Relationship status: Not on file    Intimate partner violence     Fear of current or ex partner: Not on file     Emotionally abused: Not on file     Physically abused: Not on file     Forced sexual activity: Not on file   Other Topics Concern    Not on file   Social History Narrative    Not on file       REVIEW OF SYSTEMS: A 12-point review of systems was obtained and pertinent positives and negatives were listed below. REVIEW OF SYSTEMS:     Constitutional: No fever, no chills, no lethargy, no weakness. HEENT:  No headache, otalgia, itchy eyes, nasal discharge or sore throat. Cardiac:  No chest pain, dyspnea, orthopnea or PND. Chest:   No cough, phlegm or wheezing. Abdomen:      Detailed by MA   Neuro:  No focal weakness, abnormal movements or seizure like activity. Skin:   No rashes, no itching. :   No hematuria, no pyuria, no dysuria, no flank pain. Extremities:  No swelling or joint pains. ROS was otherwise negative    Review of Systems    PHYSICAL EXAMINATION: Vital signs reviewed per the nursing documentation. Temp 97.3 °F (36.3 °C)   Resp 18   Wt 241 lb (109.3 kg)   BMI 42.69 kg/m²   Body mass index is 42.69 kg/m².    Physical Exam    Physical Exam   Constitutional: Patient is oriented to person, place, and time. Patient appears well-developed and well-nourished. HENT:   Head: Normocephalic and atraumatic. Eyes: Pupils are equal, round, and reactive to light. EOM are normal.   Neck: Normal range of motion. Neck supple. No JVD present. No tracheal deviation present. No thyromegaly present. Cardiovascular: Normal rate, regular rhythm, normal heart sounds and intact distal pulses. Pulmonary/Chest: Effort normal and breath sounds normal. No stridor. No respiratory distress. He has no wheezes. He has no rales. He exhibits no tenderness. Abdominal: Soft. Bowel sounds are normal. He exhibits no distension and no mass. There is no tenderness. There is no rebound and no guarding. No hernia. Musculoskeletal: Normal range of motion. Lymphadenopathy:    Patient has no cervical adenopathy. Neurological: Patient is alert and oriented to person, place, and time. Psychiatric: Patient has a normal mood and affect. Patient behavior is normal.       LABORATORY DATA: Reviewed  Lab Results   Component Value Date    WBC 7.0 01/07/2020    HGB 13.8 01/07/2020    HCT 41.6 01/07/2020    MCV 97.7 01/07/2020     01/07/2020     04/29/2020    K 4.0 04/29/2020     04/29/2020    CO2 23 04/29/2020    BUN 15 04/29/2020    CREATININE 0.68 04/29/2020    LABPROT 6.7 01/28/2013    LABALBU 4.0 05/15/2020    BILITOT 0.29 (L) 05/15/2020    ALKPHOS 56 05/15/2020    AST 22 05/15/2020    ALT 31 05/15/2020         Lab Results   Component Value Date    RBC 4.26 01/07/2020    HGB 13.8 01/07/2020    MCV 97.7 01/07/2020    MCH 32.4 01/07/2020    MCHC 33.2 01/07/2020    RDW 12.3 01/07/2020    MPV 10.6 01/07/2020    BASOPCT 1 01/07/2020    LYMPHSABS 2.59 01/07/2020    MONOSABS 0.74 01/07/2020    NEUTROABS 3.53 01/07/2020    EOSABS 0.12 01/07/2020    BASOSABS 0.04 01/07/2020         DIAGNOSTIC TESTING:     No results found. IMPRESSION: [address] a 31 y. o. female with a past history remarkable can still have some errors including those of syntax and sound a like substitutions which may escape proof reading. It such instances, actual meaning can be extrapolated by contextual diversion.

## 2020-08-26 ENCOUNTER — TELEPHONE (OUTPATIENT)
Dept: PRIMARY CARE CLINIC | Age: 30
End: 2020-08-26

## 2020-09-01 ENCOUNTER — TELEPHONE (OUTPATIENT)
Dept: OBGYN CLINIC | Age: 30
End: 2020-09-01

## 2020-09-01 ENCOUNTER — OFFICE VISIT (OUTPATIENT)
Dept: PRIMARY CARE CLINIC | Age: 30
End: 2020-09-01
Payer: COMMERCIAL

## 2020-09-01 VITALS
BODY MASS INDEX: 42.7 KG/M2 | OXYGEN SATURATION: 98 % | SYSTOLIC BLOOD PRESSURE: 108 MMHG | HEIGHT: 63 IN | WEIGHT: 241 LBS | DIASTOLIC BLOOD PRESSURE: 74 MMHG | TEMPERATURE: 97.5 F | HEART RATE: 84 BPM

## 2020-09-01 PROCEDURE — 99214 OFFICE O/P EST MOD 30 MIN: CPT | Performed by: PHYSICIAN ASSISTANT

## 2020-09-01 RX ORDER — CEFDINIR 300 MG/1
300 CAPSULE ORAL 2 TIMES DAILY
COMMUNITY
Start: 2020-08-25 | End: 2020-09-01

## 2020-09-01 RX ORDER — CLINDAMYCIN HYDROCHLORIDE 300 MG/1
300 CAPSULE ORAL 3 TIMES DAILY
COMMUNITY
Start: 2020-08-25 | End: 2020-09-01

## 2020-09-01 ASSESSMENT — ENCOUNTER SYMPTOMS
SORE THROAT: 0
FACIAL SWELLING: 0
TROUBLE SWALLOWING: 0
RHINORRHEA: 0
SINUS PAIN: 0
SINUS PRESSURE: 0
RESPIRATORY NEGATIVE: 1

## 2020-09-01 NOTE — PROGRESS NOTES
[address] [facility] PRIMARY CARE  [id number] [facility] [address]  Dept: Marisabel Brito is a 31 y.o. female who presents today for her medical conditions/complaintsas noted below. Chief Complaint   Patient presents with    Otalgia      Pt went to [facility] ER on 8/25 due to pain behind her right ear, patient said she had swollen lympho nodes. Patient was given two abx. HPI:     HPI  Patient was seen at [facility] 8/25/20 for lymphadenitis behind right ear. Was given Clindamycin and Cefdinir which will finish today. Also took ibuprofen and tylenol for pain. Now taking Baclofen for hiccups from GI and they are following LFTS  She is working on weight loss and is down 6 pounds via her scale    BP improved from the ER readings.     LDL Cholesterol (mg/dL)   Date Value   05/15/2020 138 (H)   01/07/2020 162 (H)       (goal LDL is <100)   AST (U/L)   Date Value   05/15/2020 22     ALT (U/L)   Date Value   05/15/2020 31     BUN (mg/dL)   Date Value   04/29/2020 15     BP Readings from Last 3 Encounters:   09/01/20 108/74   07/23/20 112/80   06/23/20 94/68          (goal 120/80)    Past Medical History:   Diagnosis Date    Asthma     Gastroparesis     GERD (gastroesophageal reflux disease)     Headache     Hepatic adenoma     [facility] Hyperlipidemia     Hypertension     Insulin resistance     Irritable bowel syndrome     Scoliosis     has an S curve     Wears glasses       Past Surgical History:   Procedure Laterality Date    ADENOIDECTOMY      BRONCHOSCOPY      CHOLECYSTECTOMY  2-1-2013    COLONOSCOPY      SHOULDER SURGERY Left     bone spur    TONSILLECTOMY      TYMPANOSTOMY TUBE PLACEMENT      UPPER GASTROINTESTINAL ENDOSCOPY N/A 6/8/2020    EGD BIOPSY performed by Taty Iqbal MD at [facility]       Family History   Problem Relation Age of Onset    Depression Mother    Murlean Mall Other Mother rectum bid x 3 days then PRN for flare of hemorrhoids (Patient not taking: Reported on 9/1/2020) 1 Tube 1     No current facility-administered medications for this visit. Allergies   Allergen Reactions    Latex Rash    Avocado Anaphylaxis    Banana Anaphylaxis    Menthol (Topical Analgesic) [Menthol (Topical Analgesic)] Other (See Comments)     Burning and rash    Sudafed [Pseudoephedrine Hcl]     Augmentin [Amoxicillin-Pot Clavulanate] Nausea And Vomiting    Tape [Adhesive Tape] Rash       Health Maintenance   Topic Date Due    Pneumococcal 0-64 years Vaccine (1 of 1 - PPSV23) 02/03/1998    HIV screen  02/03/2007    DTaP/Tdap/Td vaccine (1 - Tdap) 02/03/2011    Varicella vaccine (2 of 2 - 13+ 2-dose series) 08/24/2011    Flu vaccine (1) 09/01/2020    Potassium monitoring  04/29/2021    Creatinine monitoring  04/29/2021    Cervical cancer screen  07/23/2023    Hepatitis B vaccine  Completed    Hepatitis A vaccine  Aged Out    Hib vaccine  Aged Out    Meningococcal (ACWY) vaccine  Aged Out       Subjective:      Review of Systems   Constitutional: Negative for chills, diaphoresis and fever. HENT: Positive for ear pain (was radiating into teeth and into neck  but all resolved now). Negative for congestion, dental problem, ear discharge, facial swelling, mouth sores, postnasal drip, rhinorrhea, sinus pressure, sinus pain, sneezing, sore throat and trouble swallowing. Lump is gone   Respiratory: Negative. Cardiovascular: Negative. Musculoskeletal: Negative for neck pain and neck stiffness. Skin: Negative for rash. Hematological: Negative for adenopathy. Objective:     /74   Pulse 84   Temp 97.5 °F (36.4 °C)   Ht 5' 3\" (1.6 m)   Wt 241 lb (109.3 kg)   SpO2 98%   BMI 42.69 kg/m²   Physical Exam  Constitutional:       Appearance: Normal appearance. She is not ill-appearing.    HENT:      Right Ear: Tympanic membrane, ear canal and external ear normal.      Left Ear: Tympanic membrane, ear canal and external ear normal.      Nose: Nose normal.      Mouth/Throat:      Mouth: Mucous membranes are moist.      Pharynx: No posterior oropharyngeal erythema. Eyes:      General:         Right eye: No discharge. Left eye: No discharge. Conjunctiva/sclera: Conjunctivae normal.      Pupils: Pupils are equal, round, and reactive to light. Cardiovascular:      Rate and Rhythm: Normal rate and regular rhythm. Heart sounds: Normal heart sounds. Pulmonary:      Effort: Pulmonary effort is normal.      Breath sounds: Normal breath sounds. Neurological:      Mental Status: She is alert and oriented to person, place, and time. Psychiatric:         Mood and Affect: Mood normal.         Assessment:       Diagnosis Orders   1. Lymphadenitis, acute          Plan:    Reviewed [facility] encounter notes  Complete ABX  Ibuprofen if needed. Had seen dentist 3 days before all this happened. Return in about 6 months (around 3/1/2021), or if symptoms worsen or fail to improve, for HTN. No orders of the defined types were placed in this encounter. No orders of the defined types were placed in this encounter. Patient given educationalmaterials - see patient instructions. Discussed use, benefit, and side effectsof prescribed medications. All patient questions answered. Pt voiced understanding. Reviewed health maintenance. Instructed to continue current medications, diet andexercise. Patient agreed with treatment plan. Follow up as directed.      Electronicallysigned by Sagar Chappell PA-C on 9/1/2020 at 8:03 AM

## 2020-09-08 RX ORDER — FAMOTIDINE 40 MG/1
40 TABLET, FILM COATED ORAL DAILY
Qty: 90 TABLET | Refills: 3 | Status: SHIPPED | OUTPATIENT
Start: 2020-09-08 | End: 2021-09-27

## 2020-09-08 RX ORDER — PROMETHAZINE HYDROCHLORIDE 12.5 MG/1
12.5 TABLET ORAL EVERY 6 HOURS PRN
Qty: 30 TABLET | Refills: 1 | Status: SHIPPED | OUTPATIENT
Start: 2020-09-08

## 2020-09-08 RX ORDER — MONTELUKAST SODIUM 10 MG/1
10 TABLET ORAL NIGHTLY
Qty: 90 TABLET | Refills: 3 | Status: SHIPPED | OUTPATIENT
Start: 2020-09-08 | End: 2021-07-19

## 2020-12-03 ENCOUNTER — HOSPITAL ENCOUNTER (OUTPATIENT)
Age: 30
Setting detail: SPECIMEN
Discharge: HOME | End: 2020-12-03
Payer: COMMERCIAL

## 2020-12-03 ENCOUNTER — OFFICE VISIT (OUTPATIENT)
Dept: PRIMARY CARE CLINIC | Age: 30
End: 2020-12-03
Payer: COMMERCIAL

## 2020-12-03 ENCOUNTER — TELEPHONE (OUTPATIENT)
Dept: PRIMARY CARE CLINIC | Age: 30
End: 2020-12-03

## 2020-12-03 VITALS
DIASTOLIC BLOOD PRESSURE: 78 MMHG | OXYGEN SATURATION: 99 % | HEART RATE: 80 BPM | SYSTOLIC BLOOD PRESSURE: 114 MMHG | TEMPERATURE: 97.8 F

## 2020-12-03 PROCEDURE — 99213 OFFICE O/P EST LOW 20 MIN: CPT | Performed by: FAMILY MEDICINE

## 2020-12-03 RX ORDER — ALBUTEROL SULFATE 90 UG/1
2 AEROSOL, METERED RESPIRATORY (INHALATION) 4 TIMES DAILY PRN
Qty: 1 INHALER | Refills: 5 | Status: SHIPPED | OUTPATIENT
Start: 2020-12-03 | End: 2022-04-22

## 2020-12-03 RX ORDER — GUAIFENESIN 600 MG/1
600 TABLET, EXTENDED RELEASE ORAL 2 TIMES DAILY
Qty: 30 TABLET | Refills: 0 | COMMUNITY
Start: 2020-12-03 | End: 2020-12-18

## 2020-12-03 NOTE — PROGRESS NOTES
Allergen Reactions    Latex Rash    Avocado Anaphylaxis    Banana Anaphylaxis    Amlodipine Swelling    Menthol (Topical Analgesic) [Menthol (Topical Analgesic)] Other (See Comments)     Burning and rash    Sudafed [Pseudoephedrine Hcl]     Augmentin [Amoxicillin-Pot Clavulanate] Nausea And Vomiting    Tape Suzzane [place] Tape] Rash       Subjective:     Review of Systems   Constitutional: Negative. fronal and sinus headache  Works at a [employer]      Objective:     /78   Pulse 80   Temp 97.8 °F (36.6 °C)   LMP 06/03/2019 (Exact Date)   SpO2 99%   Physical Exam  Vitals signs and nursing note reviewed. Constitutional:       General: She is not in acute distress. Appearance: She is well-developed. She is not ill-appearing. HENT:      Head: Normocephalic and atraumatic. Right Ear: Tympanic membrane, ear canal and external ear normal.      Left Ear: Tympanic membrane, ear canal and external ear normal.   Eyes:      General: No scleral icterus. Right eye: No discharge. Left eye: No discharge. Conjunctiva/sclera: Conjunctivae normal.      Pupils: Pupils are equal, round, and reactive to light. Neck:      Thyroid: No thyromegaly. Trachea: No tracheal deviation. Cardiovascular:      Rate and Rhythm: Normal rate and regular rhythm. Heart sounds: Normal heart sounds. Pulmonary:      Effort: Pulmonary effort is normal. No respiratory distress. Breath sounds: Normal breath sounds. No wheezing. Musculoskeletal:      Right lower leg: No edema. Left lower leg: No edema. Lymphadenopathy:      Cervical: No cervical adenopathy. Skin:     General: Skin is warm. Findings: No rash. Neurological:      Mental Status: She is alert and oriented to person, place, and time. Psychiatric:         Mood and Affect: Mood normal.         Behavior: Behavior normal.         Thought Content:  Thought content normal.         Assessment:       Diagnosis Orders 1. Cough  COVID-19 Ambulatory    guaiFENesin (MUCINEX) 600 MG extended release tablet   2. Upper respiratory tract infection, unspecified type  COVID-19 Ambulatory    guaiFENesin (MUCINEX) 600 MG extended release tablet   3. Exposure to COVID-19 virus          Plan:      No follow-ups on file. Quarantine for now. Off work note until Tuesday  If her result is negative then her quarantine end 10 days after her spouse's is finished ( about 12 /19/20  Use inhaler for the cough  Orders Placed This Encounter   Procedures    COVID-19 Ambulatory     Standing Status:   Future     Standing Expiration Date:   12/3/2021     Scheduling Instructions:      Saline media preferred given current shortage of viral transport media but both acceptable     Order Specific Question:   Is this test for diagnosis or screening? Answer:   Diagnosis of ill patient     Order Specific Question:   Symptomatic for COVID-19 as defined by CDC? Answer:   Yes     Order Specific Question:   Date of Symptom Onset     Answer:   12/1/2020     Order Specific Question:   Hospitalized for COVID-19? Answer:   No     Order Specific Question:   Admitted to ICU for COVID-19? Answer:   No     Order Specific Question:   Employed in healthcare setting? Answer:   Yes     Order Specific Question:   Resident in a congregate (group) care setting? Answer:   No     Order Specific Question:   Pregnant? Answer:   No     Order Specific Question:   Previously tested for COVID-19? Answer:   Yes     Orders Placed This Encounter   Medications    albuterol sulfate HFA (VENTOLIN HFA) 108 (90 Base) MCG/ACT inhaler     Sig: Inhale 2 puffs into the lungs 4 times daily as needed for Wheezing     Dispense:  1 Inhaler     Refill:  5    guaiFENesin (MUCINEX) 600 MG extended release tablet     Sig: Take 1 tablet by mouth 2 times daily for 15 days     Dispense:  30 tablet     Refill:  0      Reviewed medications and possibleside effects. Electronically signed by Neida English MD on 12/3/2020 at 4:15 PM

## 2020-12-03 NOTE — TELEPHONE ENCOUNTER
She should have another test, a PCR test. She can come in on a red visit and get tested as well as get a note for work---she has to quarantine at least 10 days from then start of her symtoms

## 2020-12-03 NOTE — PATIENT INSTRUCTIONS
Patient Education        Learning About Coronavirus [phone])  Coronavirus [phone]): Overview  What is coronavirus ([id number]-[id number])? The coronavirus disease (COVID-19) is caused by a virus. It is an illness that was first found in December 2019. It has since spread worldwide. The virus can cause fever, cough, and trouble breathing. In severe cases, it can cause pneumonia and make it hard to breathe without help. It can cause death. This virus spreads person-to-person through droplets from coughing and sneezing. It can also spread when you are close to someone who is infected. And it can spread when you touch something that has the virus on it, such as a doorknob or a tabletop. Coronaviruses are a large group of viruses. They cause the common cold. They also cause more serious illnesses like Middle East respiratory syndrome (MERS) and severe acute respiratory syndrome (SARS). COVID-19 is caused by a novel coronavirus. That means it's a new type that has not been seen in people before. How is COVID-19 treated? Mild illness can be treated at home, but more serious illness needs to be treated in the hospital. Treatment may include medicines to reduce symptoms, plus breathing support such as oxygen therapy or a ventilator. Other treatments, such as antiviral medicines, may help people who have COVID-19. What can you do to protect yourself from COVID-19? The best way to protect yourself from getting sick is to:  · Avoid areas where there is an outbreak. · Avoid contact with people who may be infected. · Avoid crowds and try to stay at least 6 feet away from other people. · Wash your hands often, especially after you cough or sneeze. Use soap and water, and scrub for at least 20 seconds. If soap and water aren't available, use an alcohol-based hand . · Avoid touching your mouth, nose, and eyes. What can you do to avoid spreading the virus to others?   To help avoid spreading the virus to others:  · [employer] your hands often with soap or alcohol-based hand sanitizers. · Cover your mouth with a tissue when you cough or sneeze. Then throw the tissue in the trash. · Use a disinfectant to clean things that you touch often. These include doorknobs, remote controls, phones, and handles on your refrigerator and microwave. And don't forget countertops, tabletops, bathrooms, and computer keyboards. · Wear a cloth face cover if you have to go to public areas. If you know or suspect that you have COVID-19:  · Stay home. Don't go to school, work, or public areas. And don't use public transportation, ride-shares, or taxis unless you have no choice. · Leave your home only if you need to get medical care or testing. But call the doctor's office first so they know you're coming. And wear a face cover. · Limit contact with people in your home. If possible, stay in a separate bedroom and use a separate bathroom. · Wear a face cover whenever you're around other people. It can help stop the spread of the virus when you cough or sneeze. · Clean and disinfect your home every day. Use household  and disinfectant wipes or sprays. Take special care to clean things that you grab with your hands. · Self-isolate until it's safe to be around others again. ? If you have symptoms, it's safe when you haven't had a fever for 3 days and your symptoms have improved and it's been at least 10 days since your symptoms started. ? If you were exposed to the virus but don't have symptoms, it's safe to be around others 14 days after exposure. ? Talk to your doctor about whether you also need testing, especially if you have a weakened immune system. When to call for help  Call 911 anytime you think you may need emergency care. For example, call if:  · You have severe trouble breathing. (You can't talk at all.)  · You have constant chest pain or pressure. · You are severely dizzy or lightheaded.   · You are confused or can't think clearly. · Your face and lips have a blue color. · You passed out (lost consciousness) or are very hard to wake up. Call your doctor now if you develop symptoms such as:  · Shortness of breath. · Fever. · Cough. If you need to get care, call ahead to the doctor's office for instructions before you go. Make sure you wear a face cover to prevent exposing other people to the virus. Where can you get the latest information? The following health organizations are tracking and studying this virus. Their websites contain the most up-to-date information. Simónchesterroslyn Doe also learn what to do if you think you may have been exposed to the virus. · U.S. Centers for Disease Control and Prevention (CDC): The CDC provides updated news about the disease and travel advice. The website also tells you how to prevent the spread of infection. www.cdc.gov  · World Health Organization [facility]): WHO offers information about the virus outbreaks. WHO also has travel advice. www.who.int  Current as of: July 10, 2020               Content Version: 12.6  © 2006-2020 [employer]. Care instructions adapted under license by [facility] ([facility] If you have questions about a medical condition or this instruction, always ask your healthcare professional. [address] any warranty or liability for your use of this information. Patient Education        Cough: Care Instructions  Your Care Instructions     A cough is your body's response to something that bothers your throat or airways. Many things can cause a cough. You might cough because of a cold or the flu, bronchitis, or asthma. Smoking, postnasal drip, allergies, and stomach acid that backs up into your throat also can cause coughs. A cough is a symptom, not a disease. Most coughs stop when the cause, such as a cold, goes away. You can take a few steps at home to cough less and feel better. Follow-up care is a key part of your treatment and safety.  Be sure to make and go to all appointments, and call your doctor if you are having problems. It's also a good idea to know your test results and keep a list of the medicines you take. How can you care for yourself at home? · Drink lots of water and other fluids. This helps thin the mucus and soothes a dry or sore throat. Honey or lemon juice in hot water or tea may ease a dry cough. · Take cough medicine as directed by your doctor. · Prop up your head on pillows to help you breathe and ease a dry cough. · Try cough drops to soothe a dry or sore throat. Cough drops don't stop a cough. Medicine-flavored cough drops are no better than candy-flavored drops or hard candy. · Do not smoke. Avoid secondhand smoke. If you need help quitting, talk to your doctor about stop-smoking programs and medicines. These can increase your chances of quitting for good. When should you call for help? Call 911 anytime you think you may need emergency care. For example, call if:    · You have severe trouble breathing. Call your doctor now or seek immediate medical care if:    · You cough up blood.     · You have new or worse trouble breathing.     · You have a new or higher fever.     · You have a new rash. Watch closely for changes in your health, and be sure to contact your doctor if:    · You cough more deeply or more often, especially if you notice more mucus or a change in the color of your mucus.     · You have new symptoms, such as a sore throat, an earache, or sinus pain.     · You do not get better as expected. Where can you learn more? Go to https://"[employer]peKoru.TravelRent.com. org and sign in to your [employer] account. Enter D279 in the [employer] box to learn more about \"Cough: Care Instructions. \"     If you do not have an account, please click on the \"Sign Up Now\" link. Current as of: February 24, 2020               Content Version: 12.6  © 2455-0420 [employer].    Care instructions adapted under license by [facility] ([place] [facility]). If you have questions about a medical condition or this instruction, always ask your healthcare professional. [place] [address] any warranty or liability for your use of this information.

## 2020-12-03 NOTE — TELEPHONE ENCOUNTER
Patient scheduled for today at 4:00. She has asthma - she thinks she may eed to start breathing treatments. She states her cough is extremely \"barky\" - she is coughing so hard she is urinating. She also states she is extremely SOB - if she walks farther than her bathroom she has a very hard time breathing.

## 2020-12-03 NOTE — TELEPHONE ENCOUNTER
Pt calling to state that her  tested positive yesterday for covid. Pt was tested on mon at [facility] ([facility]) that came back neg. Pt started with a headache, stuffy head, sore throat on Mon. Now, because her test came back neg, she was told by her employer that she needs to come to work, or jeopardize her job. Pt told by  to quarantine. Pt upset because she said that the HR man is being very mean about it. Please advise.  [phone]

## 2020-12-05 LAB — SARS-COV-2, NAA: NOT DETECTED

## 2021-03-02 ENCOUNTER — OFFICE VISIT (OUTPATIENT)
Dept: PRIMARY CARE CLINIC | Age: 31
End: 2021-03-02
Payer: COMMERCIAL

## 2021-03-02 VITALS
BODY MASS INDEX: 42.17 KG/M2 | HEART RATE: 88 BPM | WEIGHT: 238 LBS | SYSTOLIC BLOOD PRESSURE: 132 MMHG | OXYGEN SATURATION: 98 % | TEMPERATURE: 96.8 F | DIASTOLIC BLOOD PRESSURE: 80 MMHG | HEIGHT: 63 IN

## 2021-03-02 DIAGNOSIS — I10 ESSENTIAL HYPERTENSION: Primary | ICD-10-CM

## 2021-03-02 DIAGNOSIS — E78.2 MIXED HYPERLIPIDEMIA: ICD-10-CM

## 2021-03-02 DIAGNOSIS — F33.8 SEASONAL AFFECTIVE DISORDER (HCC): ICD-10-CM

## 2021-03-02 DIAGNOSIS — K31.7 GASTRIC POLYPS: ICD-10-CM

## 2021-03-02 PROCEDURE — 99213 OFFICE O/P EST LOW 20 MIN: CPT | Performed by: PHYSICIAN ASSISTANT

## 2021-03-02 RX ORDER — CYCLOBENZAPRINE HCL 10 MG
TABLET ORAL
COMMUNITY
Start: 2021-02-20 | End: 2022-06-27

## 2021-03-02 RX ORDER — ATENOLOL 25 MG/1
25 TABLET ORAL DAILY
Qty: 30 TABLET | Refills: 5 | Status: SHIPPED | OUTPATIENT
Start: 2021-03-02 | End: 2021-12-27

## 2021-03-02 ASSESSMENT — ENCOUNTER SYMPTOMS
COUGH: 0
CHEST TIGHTNESS: 0
COLOR CHANGE: 0
ABDOMINAL PAIN: 0
SHORTNESS OF BREATH: 0
APNEA: 0

## 2021-03-02 ASSESSMENT — PATIENT HEALTH QUESTIONNAIRE - PHQ9
SUM OF ALL RESPONSES TO PHQ QUESTIONS 1-9: 2
2. FEELING DOWN, DEPRESSED OR HOPELESS: 1
SUM OF ALL RESPONSES TO PHQ QUESTIONS 1-9: 2

## 2021-03-02 NOTE — PROGRESS NOTES
[facility]  [address] [facility] [address]  Dept: 2100 Jose Horner is a 36 y.o. female who presents today for her medical conditions/complaintsas noted below. Chief Complaint   Patient presents with    Hypertension     Patient does not check BP.        HPI:     HPI  Taking Atenolol regularly. No CP, SOB, dizziness, palps, syncope    Tracee Bake and now seeing Worker's comp for this    Having SAD issues. LDL Cholesterol (mg/dL)   Date Value   05/15/2020 138 (H)   01/07/2020 162 (H)       (goal LDL is <100)   AST (U/L)   Date Value   05/15/2020 22     ALT (U/L)   Date Value   05/15/2020 31     BUN (mg/dL)   Date Value   04/29/2020 15     BP Readings from Last 3 Encounters:   03/02/21 132/80   12/03/20 114/78   09/01/20 108/74          (goal 120/80)    Past Medical History:   Diagnosis Date    Asthma     Gastroparesis     GERD (gastroesophageal reflux disease)     Headache     Hepatic adenoma     has [facility] to [facility] Hyperlipidemia     Hypertension     Insulin resistance     Irritable bowel syndrome     Scoliosis     has an S curve     Seasonal affective disorder ([address] 3/2/2021    Wears glasses       Past Surgical History:   Procedure Laterality Date    ADENOIDECTOMY      BRONCHOSCOPY      CHOLECYSTECTOMY  2-1-2013    COLONOSCOPY      SHOULDER SURGERY Left     bone spur    TONSILLECTOMY      TYMPANOSTOMY TUBE PLACEMENT      UPPER GASTROINTESTINAL ENDOSCOPY N/A 6/8/2020    EGD BIOPSY performed by Anoop Werner MD at [facility]       Family History   Problem Relation Age of Onset    Depression Mother    Khloe Seajaleel Other Mother         pancreatic issues.     High Blood Pressure Mother     High Cholesterol Mother     No Known Problems Father     Other Maternal Grandmother         vascular disease    High Blood Pressure Maternal Grandmother     High Cholesterol Maternal Grandmother     Diabetes Maternal Grandfather     High Cholesterol Maternal Grandfather     No Known Problems Paternal Grandmother     No Known Problems Paternal Grandfather     Other Sister         melonoma    Depression Sister     Anxiety Disorder Sister     Cervical Cancer Maternal Aunt 21       Social History     Tobacco Use    Smoking status: Never Smoker    Smokeless tobacco: Never Used   Substance Use Topics    Alcohol use: Not Currently      Current Outpatient Medications   Medication Sig Dispense Refill    cyclobenzaprine (FLEXERIL) 10 MG tablet       atenolol (TENORMIN) 25 MG tablet Take 1 tablet by mouth daily 30 tablet 5    albuterol sulfate HFA (VENTOLIN HFA) 108 (90 Base) MCG/ACT inhaler Inhale 2 puffs into the lungs 4 times daily as needed for Wheezing 1 Inhaler 5    famotidine (PEPCID) 40 MG tablet Take 1 tablet by mouth daily 90 tablet 3    montelukast (SINGULAIR) 10 MG tablet Take 1 tablet by mouth nightly 90 tablet 3    baclofen (LIORESAL) 10 MG tablet Take 1 tablet by mouth 3 times daily 90 tablet 2    pantoprazole (PROTONIX) 40 MG tablet Take 1 tablet by mouth 2 times daily (Patient taking differently: Take 40 mg by mouth daily ) 180 tablet 2    ALBUTEROL IN Inhale into the lungs      Multiple Vitamins-Minerals (CENTRUM MULTIGUMMIES) CHEW Take by mouth      Cetirizine HCl 10 MG CAPS Take by mouth      Cholecalciferol (VITAMIN D3 PO) Take by mouth daily      promethazine (PHENERGAN) 12.5 MG tablet Take 1 tablet by mouth every 6 hours as needed for Nausea (Patient not taking: Reported on 3/2/2021) 30 tablet 1    diphenhydrAMINE (BENADRYL ALLERGY) 25 MG tablet Take 50 mg by mouth every 6 hours as needed for Itching       hydrocortisone (ANUSOL-HC) 2.5 % CREA rectal cream Apply to rectum bid x 3 days then PRN for flare of hemorrhoids (Patient not taking: Reported on 9/1/2020) 1 Tube 1    EPINEPHrine HCl, Anaphylaxis, (EPIPEN 2-ANNABELLE IM) Inject into the muscle       No current facility-administered medications for this visit. Allergies   Allergen Reactions    Latex Rash    Avocado Anaphylaxis    Banana Anaphylaxis    Amlodipine Swelling    Menthol (Topical Analgesic) [Menthol (Topical Analgesic)] Other (See Comments)     Burning and rash    Sudafed [Pseudoephedrine Hcl]     Augmentin [Amoxicillin-Pot Clavulanate] Nausea And Vomiting    Tape [Adhesive Tape] Rash       Health Maintenance   Topic Date Due    Hepatitis C screen  1990    HIV screen  02/03/2007    DTaP/Tdap/Td vaccine (1 - Tdap) 02/03/2011    Varicella vaccine (2 of 2 - 13+ 2-dose series) 08/24/2011    Flu vaccine (1) 09/01/2020    Potassium monitoring  04/29/2021    Creatinine monitoring  04/29/2021    Cervical cancer screen  07/23/2023    Hepatitis B vaccine  Completed    Hepatitis A vaccine  Aged Out    Hib vaccine  Aged Out    Meningococcal (ACWY) vaccine  Aged Out    Pneumococcal 0-64 years Vaccine  Aged Out       Subjective:      Review of Systems   Constitutional: Negative for fatigue. Respiratory: Negative for apnea, cough, chest tightness and shortness of breath. Cardiovascular: Negative for chest pain, palpitations and leg swelling. Gastrointestinal: Negative for abdominal pain. Genitourinary: Positive for menstrual problem (finally had period). Musculoskeletal: Positive for arthralgias (right hip from a fall at work). Skin: Negative for color change. Neurological: Negative for dizziness, syncope, weakness, light-headedness and headaches. Psychiatric/Behavioral: Positive for dysphoric mood. Negative for sleep disturbance. The patient is not nervous/anxious. Objective:     /80   Pulse 88   Temp 96.8 °F (36 °C)   Ht 5' 3\" (1.6 m)   Wt 238 lb (108 kg)   SpO2 98%   BMI 42.16 kg/m²   Physical Exam  Vitals signs reviewed. Neck:      Musculoskeletal: No edema. Vascular: No carotid bruit. Cardiovascular:      Rate and Rhythm: Normal rate and regular rhythm.       Heart sounds: Normal heart sounds. No murmur. No friction rub. No gallop. Pulmonary:      Effort: Pulmonary effort is normal.      Breath sounds: Normal breath sounds. Neurological:      Mental Status: She is alert and oriented to person, place, and time. Assessment:       Diagnosis Orders   1. Essential hypertension  Lipid Panel    Comprehensive Metabolic Panel, Fasting   2. Mixed hyperlipidemia  Lipid Panel    Comprehensive Metabolic Panel, Fasting   3. Gastric polyps     4. Seasonal affective disorder ([address]          Plan:     ordered  Refilled Atenolol  Hip pain and injury with worker's comp provider  Seeing GI in [address] [place] [address]  Try happy lite for the SAD    Return in about 6 months (around 9/2/2021) for HTN and Asthma. Orders Placed This Encounter   Procedures    Lipid Panel     Standing Status:   Future     Standing Expiration Date:   3/2/2022     Order Specific Question:   Is Patient Fasting?/# of Hours     Answer:   10-12 hours    Comprehensive Metabolic Panel, Fasting     Standing Status:   Future     Standing Expiration Date:   3/2/2022     Orders Placed This Encounter   Medications    atenolol (TENORMIN) 25 MG tablet     Sig: Take 1 tablet by mouth daily     Dispense:  30 tablet     Refill:  5       Patient given educationalmaterials - see patient instructions. Discussed use, benefit, and side effectsof prescribed medications. All patient questions answered. Pt voiced understanding. Reviewed health maintenance. Instructed to continue current medications, diet andexercise. Patient agreed with treatment plan. Follow up as directed.      Electronicallysigned by Swapna Kee PA-C on 3/2/2021 at 8:39 AM

## 2021-03-09 RX ORDER — PANTOPRAZOLE SODIUM 40 MG/1
TABLET, DELAYED RELEASE ORAL
Qty: 180 TABLET | Refills: 0 | Status: SHIPPED | OUTPATIENT
Start: 2021-03-09 | End: 2021-09-10 | Stop reason: SDUPTHER

## 2021-04-26 ENCOUNTER — OFFICE VISIT (OUTPATIENT)
Dept: PRIMARY CARE CLINIC | Age: 31
End: 2021-04-26
Payer: COMMERCIAL

## 2021-04-26 ENCOUNTER — HOSPITAL ENCOUNTER (OUTPATIENT)
Age: 31
Setting detail: SPECIMEN
Discharge: HOME | End: 2021-04-26
Payer: COMMERCIAL

## 2021-04-26 VITALS
SYSTOLIC BLOOD PRESSURE: 126 MMHG | HEART RATE: 106 BPM | DIASTOLIC BLOOD PRESSURE: 82 MMHG | BODY MASS INDEX: 43.27 KG/M2 | HEIGHT: 63 IN | OXYGEN SATURATION: 98 % | WEIGHT: 244.2 LBS

## 2021-04-26 DIAGNOSIS — R11.0 NAUSEA: ICD-10-CM

## 2021-04-26 DIAGNOSIS — M54.50 CHRONIC BILATERAL LOW BACK PAIN WITHOUT SCIATICA: ICD-10-CM

## 2021-04-26 DIAGNOSIS — G89.29 CHRONIC BILATERAL LOW BACK PAIN WITHOUT SCIATICA: ICD-10-CM

## 2021-04-26 DIAGNOSIS — I10 ESSENTIAL HYPERTENSION: ICD-10-CM

## 2021-04-26 DIAGNOSIS — R20.0 NUMBNESS OF FOOT: Primary | ICD-10-CM

## 2021-04-26 DIAGNOSIS — Z13.1 SCREENING FOR DIABETES MELLITUS: ICD-10-CM

## 2021-04-26 DIAGNOSIS — D17.23 LIPOMA OF RIGHT LOWER EXTREMITY: ICD-10-CM

## 2021-04-26 LAB
ALBUMIN SERPL-MCNC: 4.2 G/DL (ref 3.5–5.2)
ALBUMIN/GLOBULIN RATIO: 1.5 (ref 1–2.5)
ALP BLD-CCNC: 48 U/L (ref 35–104)
ALT SERPL-CCNC: 22 U/L (ref 5–33)
ANION GAP SERPL CALCULATED.3IONS-SCNC: 7 MMOL/L (ref 9–17)
AST SERPL-CCNC: 18 U/L
BILIRUB SERPL-MCNC: 0.24 MG/DL (ref 0.3–1.2)
BUN BLDV-MCNC: 10 MG/DL (ref 6–20)
BUN/CREAT BLD: ABNORMAL (ref 9–20)
CALCIUM SERPL-MCNC: 9.6 MG/DL (ref 8.6–10.4)
CHLORIDE BLD-SCNC: 100 MMOL/L (ref 98–107)
CO2: 27 MMOL/L (ref 20–31)
CREAT SERPL-MCNC: 0.65 MG/DL (ref 0.5–0.9)
GFR AFRICAN AMERICAN: >60 ML/MIN
GFR NON-AFRICAN AMERICAN: >60 ML/MIN
GFR SERPL CREATININE-BSD FRML MDRD: ABNORMAL ML/MIN/{1.73_M2}
GFR SERPL CREATININE-BSD FRML MDRD: ABNORMAL ML/MIN/{1.73_M2}
GLUCOSE BLD-MCNC: 104 MG/DL (ref 70–99)
POTASSIUM SERPL-SCNC: 4.4 MMOL/L (ref 3.7–5.3)
SODIUM BLD-SCNC: 134 MMOL/L (ref 135–144)
TOTAL PROTEIN: 7 G/DL (ref 6.4–8.3)

## 2021-04-26 PROCEDURE — 99213 OFFICE O/P EST LOW 20 MIN: CPT | Performed by: PHYSICIAN ASSISTANT

## 2021-04-26 RX ORDER — NAPROXEN 375 MG/1
375 TABLET ORAL 2 TIMES DAILY WITH MEALS
Qty: 60 TABLET | Refills: 0 | Status: SHIPPED | OUTPATIENT
Start: 2021-04-26

## 2021-04-26 ASSESSMENT — ENCOUNTER SYMPTOMS
BLOOD IN STOOL: 0
ABDOMINAL PAIN: 0
NAUSEA: 1
BACK PAIN: 1
RESPIRATORY NEGATIVE: 1

## 2021-04-26 NOTE — PROGRESS NOTES
Skin Spot Check    4/26/2021  Sarita Gallegos  1990  Chief Complaint   Patient presents with    Other     Left leg  ( 3 bumps )         Location:  right leg  Duration:  Since injury at work and [facility] MD says not related. Itch:  No  Bleed:  No  Growing:  No  History of skin cancer: No   none  Sleeping:right foot is freezing compared to left. Turning foot side to side makes her back hurt. With certain movements she gets a numb right foot. Happened during her PT for hip and PT stopped, it resolved. No prior back issues or surgeries. Review of Systems   Constitutional: Negative for chills, diaphoresis and fever. Respiratory: Negative. Cardiovascular: Negative. Gastrointestinal: Positive for nausea (off and on). Negative for abdominal pain and blood in stool. Genitourinary: Negative for difficulty urinating, dysuria and hematuria. Musculoskeletal: Positive for back pain and myalgias. Negative for neck pain and neck stiffness. Skin: Negative for rash. 3 lumps in right thigh   Neurological: Positive for numbness (right foot). Negative for weakness. Hematological: Negative for adenopathy. Physical Exam  Vitals signs and nursing note reviewed. Constitutional:       Appearance: Normal appearance. Cardiovascular:      Rate and Rhythm: Normal rate and regular rhythm. Heart sounds: Normal heart sounds. Pulmonary:      Effort: Pulmonary effort is normal.      Breath sounds: Normal breath sounds. Musculoskeletal:      Lumbar back: She exhibits decreased range of motion, tenderness, bony tenderness and pain. She exhibits no swelling, no edema, no deformity, no laceration, no spasm and normal pulse. Comments: Negative SLR     Skin:     Findings: No rash. Comments: 2 soft lumps in anterior right thigh with non distinct borders, non tender. Neurological:      Mental Status: She is alert and oriented to person, place, and time.    Psychiatric:         Mood and Affect: Mood normal.       Legs:  No Callus, Ulcers, Abnormal nails or suspicious lesions      ICD-10-CM    1. Numbness of foot  R20.0 XR LUMBAR SPINE (MIN 4 VIEWS)     naproxen (NAPROSYN) 375 MG tablet   2. Chronic bilateral low back pain without sciatica  M54.5 XR LUMBAR SPINE (MIN 4 VIEWS)    G89.29 [facility]     naproxen (NAPROSYN) 375 MG tablet     CANCELED: Basic Metabolic Panel   3. Essential hypertension  I10 Comprehensive Metabolic Panel   4. Nausea  R11.0 Comprehensive Metabolic Panel   5. Screening for diabetes mellitus  Z13.1 Glucose   6. Lipoma of right lower extremity  D17.23        Reassurance on lipomas  X-ray for back  PT for back  Naprosyn bid and heat with stretching for back.     BW ordered          Le Walter [facility]

## 2021-04-27 ENCOUNTER — TELEPHONE (OUTPATIENT)
Dept: PRIMARY CARE CLINIC | Age: 31
End: 2021-04-27

## 2021-04-27 NOTE — TELEPHONE ENCOUNTER
Pt called stating she was seen at the office yesterday and never received a note for work. She asked if someone can Fax over a Work Note just saying she was there for an appointment yesterday. Fax number [phone].  Thank you

## 2021-04-27 NOTE — LETTER
[facility]  [address] [place] [address]  Phone: [phone]  Fax: [phone]    Juventino Quach        April 28, 2021     Patient: Michael Andrews   YOB: 1990   Date of Visit: 4/27/2021       To Whom it May Concern:    Chino Benítez was seen in my clinic on 4/27/21. She may return to work on 4/28/21. If you have any questions or concerns, please don't hesitate to call.     Sincerely,         Jeannine Hilton PA-C

## 2021-06-07 ENCOUNTER — HOSPITAL ENCOUNTER (OUTPATIENT)
Age: 31
Setting detail: SPECIMEN
Discharge: HOME | End: 2021-06-07
Payer: COMMERCIAL

## 2021-06-07 ENCOUNTER — HOSPITAL ENCOUNTER (OUTPATIENT)
Dept: MRI IMAGING | Facility: CLINIC | Age: 31
Discharge: HOME | End: 2021-06-09
Payer: COMMERCIAL

## 2021-06-07 ENCOUNTER — HOSPITAL ENCOUNTER (OUTPATIENT)
Dept: GENERAL RADIOLOGY | Facility: CLINIC | Age: 31
Discharge: HOME | End: 2021-06-09
Payer: COMMERCIAL

## 2021-06-07 DIAGNOSIS — T14.8XXA SPRAIN: ICD-10-CM

## 2021-06-07 DIAGNOSIS — I10 ESSENTIAL HYPERTENSION: ICD-10-CM

## 2021-06-07 DIAGNOSIS — N91.2 AMENORRHEA: ICD-10-CM

## 2021-06-07 DIAGNOSIS — Z13.1 SCREENING FOR DIABETES MELLITUS: ICD-10-CM

## 2021-06-07 DIAGNOSIS — M54.50 CHRONIC BILATERAL LOW BACK PAIN WITHOUT SCIATICA: ICD-10-CM

## 2021-06-07 DIAGNOSIS — G89.29 CHRONIC BILATERAL LOW BACK PAIN WITHOUT SCIATICA: ICD-10-CM

## 2021-06-07 DIAGNOSIS — R20.0 NUMBNESS OF FOOT: ICD-10-CM

## 2021-06-07 DIAGNOSIS — E78.2 MIXED HYPERLIPIDEMIA: ICD-10-CM

## 2021-06-07 LAB
AMOUNT GLUCOSE GIVEN: NORMAL G
CHOLESTEROL/HDL RATIO: 6.6
CHOLESTEROL: 272 MG/DL
GLUCOSE BLD-MCNC: 86 MG/DL (ref 70–99)
GLUCOSE FASTING: 84 MG/DL (ref 65–99)
GLUCOSE TOLERANCE TEST 1 HOUR: 138 MG/DL (ref 65–184)
GLUCOSE TOLERANCE TEST 2 HOUR: 106 MG/DL (ref 65–139)
HDLC SERPL-MCNC: 41 MG/DL
LDL CHOLESTEROL: 190 MG/DL (ref 0–130)
SEX HORMONE BINDING GLOBULIN: 30 NMOL/L (ref 30–135)
TRIGL SERPL-MCNC: 205 MG/DL
VLDLC SERPL CALC-MCNC: ABNORMAL MG/DL (ref 1–30)

## 2021-06-07 PROCEDURE — 73721 MRI JNT OF LWR EXTRE W/O DYE: CPT

## 2021-06-07 PROCEDURE — 72100 X-RAY EXAM L-S SPINE 2/3 VWS: CPT

## 2021-06-07 PROCEDURE — 73718 MRI LOWER EXTREMITY W/O DYE: CPT

## 2021-06-08 ENCOUNTER — OFFICE VISIT (OUTPATIENT)
Dept: PRIMARY CARE CLINIC | Age: 31
End: 2021-06-08
Payer: COMMERCIAL

## 2021-06-08 VITALS
HEART RATE: 85 BPM | OXYGEN SATURATION: 98 % | WEIGHT: 246 LBS | DIASTOLIC BLOOD PRESSURE: 80 MMHG | BODY MASS INDEX: 43.59 KG/M2 | HEIGHT: 63 IN | SYSTOLIC BLOOD PRESSURE: 132 MMHG

## 2021-06-08 DIAGNOSIS — F41.8 DEPRESSION WITH ANXIETY: ICD-10-CM

## 2021-06-08 DIAGNOSIS — G89.29 CHRONIC BILATERAL LOW BACK PAIN WITHOUT SCIATICA: ICD-10-CM

## 2021-06-08 DIAGNOSIS — M54.50 CHRONIC BILATERAL LOW BACK PAIN WITHOUT SCIATICA: ICD-10-CM

## 2021-06-08 DIAGNOSIS — E78.2 MIXED HYPERLIPIDEMIA: Primary | ICD-10-CM

## 2021-06-08 PROCEDURE — 99214 OFFICE O/P EST MOD 30 MIN: CPT | Performed by: PHYSICIAN ASSISTANT

## 2021-06-08 NOTE — PROGRESS NOTES
[address] [facility] PRIMARY CARE  [address] [facility] [address]  Dept: 2100 Jose Horner is a 36 y.o. female who presents today for her medical conditions/complaints as noted below. Chief Complaint   Patient presents with    Other     6 wee f/u on numbness in right foot, patient said she finishes PT tomorrow.  Results     discuss lab results and XR results, results in chart. HPI:     HPI  Spine x-ray shows mild scoliosis o/w normal  Looks like she had MRIs of right hip and femur yesterday with [facility]  PT helped some  No longer working   Lost 3 family members and had to id her cousin who commited suicide recently   Feeling down with trouble sleeping sometimes and other times sleeping too much.       Cholesterol labs show total 272 and LDL of 190 with ratio of 6.6  Will be seeing dietician and Endocrinologist at [facility]   Started an omega and vitamins for the PCOS  LDL Cholesterol (mg/dL)   Date Value   06/07/2021 190 (H)   05/15/2020 138 (H)   01/07/2020 162 (H)       (goal LDL is <100)   AST (U/L)   Date Value   04/26/2021 18     ALT (U/L)   Date Value   04/26/2021 22     BUN (mg/dL)   Date Value   04/26/2021 10     BP Readings from Last 3 Encounters:   06/08/21 132/80   04/26/21 126/82   03/02/21 132/80          (goal 120/80)    Past Medical History:   Diagnosis Date    Asthma     Chronic bilateral low back pain without sciatica 4/26/2021    Depression with anxiety 6/8/2021    Gastroparesis     GERD (gastroesophageal reflux disease)     Headache     Hepatic adenoma     has referral to [facility] Hyperlipidemia     Hypertension     Insulin resistance     Irritable bowel syndrome     Scoliosis     has an S curve     Seasonal affective disorder ([address] 3/2/2021    Wears glasses       Past Surgical History:   Procedure Laterality Date    ADENOIDECTOMY      BRONCHOSCOPY      CHOLECYSTECTOMY  2-1-2013    COLONOSCOPY      SHOULDER SURGERY Left     bone spur    TONSILLECTOMY      TYMPANOSTOMY TUBE PLACEMENT      UPPER GASTROINTESTINAL ENDOSCOPY N/A 6/8/2020    EGD BIOPSY performed by Sherrie Villalta MD at [facility]       Family History   Problem Relation Age of Onset    Depression Mother    Tristin Hicks Other Mother         pancreatic issues.  High Blood Pressure Mother     High Cholesterol Mother     No Known Problems Father     Other Maternal Grandmother         vascular disease    High Blood Pressure Maternal Grandmother     High Cholesterol Maternal Grandmother     Diabetes Maternal Grandfather     High Cholesterol Maternal Grandfather     No Known Problems Paternal Grandmother     No Known Problems Paternal Grandfather     Other Sister         melonoma    Depression Sister     Anxiety Disorder Sister     Cervical Cancer Maternal Aunt 20       Social History     Tobacco Use    Smoking status: Never Smoker    Smokeless tobacco: Never Used   Substance Use Topics    Alcohol use: Not Currently      Current Outpatient Medications   Medication Sig Dispense Refill    CINNAMON PO Take by mouth      VITAMIN E PO Take by mouth      MAGNESIUM MALATE PO Take by mouth      Folic Acid (FOLATE PO) Take by mouth      Ascorbic Acid (VITAMIN C PO) Take by mouth      Omega-3 Fatty Acids (OMEGA 3 PO) Take by mouth      VORTIoxetine (TRINTELLIX) 5 MG tablet Take 2 tablets by mouth daily 30 tablet 1    EPINEPHrine (EPIPEN) 0.3 MG/0.3ML SOAJ injection INJECT INTO THE MIDDLE OF THE OUTER THIGH AND HOLD FOR 3 SECONDS AS NEEDED FOR SEVERE ALLERGIC REACTION THEN CALL 911 IF USED.  2 each 1    pantoprazole (PROTONIX) 40 MG tablet TAKE ONE TABLET BY MOUTH TWICE A  tablet 0    cyclobenzaprine (FLEXERIL) 10 MG tablet       atenolol (TENORMIN) 25 MG tablet Take 1 tablet by mouth daily 30 tablet 5    albuterol sulfate HFA (VENTOLIN HFA) 108 (90 Base) MCG/ACT inhaler Inhale 2 puffs into the lungs 4 times daily as needed for Aged Out       Subjective:      Review of Systems   Psychiatric/Behavioral: Positive for dysphoric mood and sleep disturbance. The patient is nervous/anxious. Objective:     /80   Pulse 85   Ht 5' 3\" (1.6 m)   Wt 246 lb (111.6 kg)   SpO2 98%   BMI 43.58 kg/m²   Physical Exam  Vitals and nursing note reviewed. Constitutional:       Appearance: Normal appearance. She is obese. Neurological:      Mental Status: She is alert and oriented to person, place, and time. Psychiatric:         Attention and Perception: Attention normal.         Mood and Affect: Mood is depressed. Speech: Speech normal.         Behavior: Behavior normal.         Thought Content: Thought content normal.         Cognition and Memory: Cognition and memory normal.         Judgment: Judgment normal.         Assessment:       Diagnosis Orders   1. Mixed hyperlipidemia     2. Depression with anxiety     3. Chronic bilateral low back pain without sciatica          Plan:    Start Trintellix--did discuss transient side effects   Counselor list given. Try Melatonin for sleep  Discussed diet and exercise for cholesterol  Discussed medications for cholesterol --discussed importance of starting now but she wants to wait until she [facility]   Try biking or swimming for more tolerable exercise  Reviewed x-ray and BW  Will meet back on the cholesterol in 3 months and 1 month for Trintellix    Return in about 1 month (around 7/8/2021) for 1/2 hour. No orders of the defined types were placed in this encounter. Orders Placed This Encounter   Medications    VORTIoxetine (TRINTELLIX) 5 MG tablet     Sig: Take 2 tablets by mouth daily     Dispense:  30 tablet     Refill:  1       Patient given educational materials - see patient instructions. Discussed use, benefit, and side effects of prescribed medications. All patient questions answered. Pt voiced understanding. Reviewed health maintenance.   Instructed to continue current medications, diet and exercise. Patient agreed with treatment plan. Follow up as directed.      Electronically signed by Demetrius Orr PA-C on 6/8/2021 at 9:33 AM

## 2021-06-09 ENCOUNTER — TELEPHONE (OUTPATIENT)
Dept: PRIMARY CARE CLINIC | Age: 31
End: 2021-06-09

## 2021-06-10 LAB — 17 OH PROGESTERONE: 20.37 NG/DL

## 2021-07-13 ENCOUNTER — OFFICE VISIT (OUTPATIENT)
Dept: PRIMARY CARE CLINIC | Age: 31
End: 2021-07-13
Payer: COMMERCIAL

## 2021-07-13 VITALS
HEART RATE: 79 BPM | DIASTOLIC BLOOD PRESSURE: 78 MMHG | OXYGEN SATURATION: 98 % | WEIGHT: 249 LBS | SYSTOLIC BLOOD PRESSURE: 130 MMHG | BODY MASS INDEX: 44.12 KG/M2 | HEIGHT: 63 IN

## 2021-07-13 DIAGNOSIS — F41.8 DEPRESSION WITH ANXIETY: ICD-10-CM

## 2021-07-13 DIAGNOSIS — E66.01 CLASS 3 SEVERE OBESITY WITH SERIOUS COMORBIDITY AND BODY MASS INDEX (BMI) OF 45.0 TO 49.9 IN ADULT, UNSPECIFIED OBESITY TYPE (HCC): ICD-10-CM

## 2021-07-13 DIAGNOSIS — E78.2 MIXED HYPERLIPIDEMIA: Primary | ICD-10-CM

## 2021-07-13 PROBLEM — E66.813 CLASS 3 SEVERE OBESITY WITH SERIOUS COMORBIDITY IN ADULT: Status: ACTIVE | Noted: 2021-07-13

## 2021-07-13 PROCEDURE — 99213 OFFICE O/P EST LOW 20 MIN: CPT | Performed by: PHYSICIAN ASSISTANT

## 2021-07-13 RX ORDER — BUPROPION HYDROCHLORIDE 150 MG/1
150 TABLET ORAL EVERY MORNING
Qty: 30 TABLET | Refills: 1 | Status: SHIPPED | OUTPATIENT
Start: 2021-07-13 | End: 2021-09-27

## 2021-07-13 ASSESSMENT — ENCOUNTER SYMPTOMS
COUGH: 0
VOMITING: 0
EYE REDNESS: 0
NAUSEA: 0
SHORTNESS OF BREATH: 0
RHINORRHEA: 0
ABDOMINAL PAIN: 0
DIARRHEA: 0
EYE DISCHARGE: 0
WHEEZING: 0
SORE THROAT: 0

## 2021-07-13 NOTE — PROGRESS NOTES
[address] [facility] PRIMARY CARE  [address] [facility] [address]  Dept: 2100 Jose Horner is a 36 y.o. female who presents today for her medical conditions/complaints as noted below. Chief Complaint   Patient presents with    Medication Check     1 month f/u on Trintellix, patient said she is very nauseous on medication.  Other     Pt said she finished PT ( [facility]) for her back, patient said she is still having lower and mid back pain. HPI:     HPI  Started Trintellix and is very nauseous on it  Melatonin for sleep--takes 3mg   Was screened for RUI     Endo at [facility] seen yet  Working on diet and exercise. Eating out less and eating fresh instead of frozen. Not losing weight--gained 3 pounds in last month   Not exercising due to working a lot    Finished PT for her back and still having pain. PT wanted to continue for her back but she can't with work and she did not think it was helping . Not taking the Naprosyn or Tylenol per her liver doctor.    LDL Cholesterol (mg/dL)   Date Value   06/07/2021 190 (H)   05/15/2020 138 (H)   01/07/2020 162 (H)       (goal LDL is <100)   AST (U/L)   Date Value   04/26/2021 18     ALT (U/L)   Date Value   04/26/2021 22     BUN (mg/dL)   Date Value   04/26/2021 10     BP Readings from Last 3 Encounters:   07/13/21 130/78   06/08/21 132/80   04/26/21 126/82          (goal 120/80)    Past Medical History:   Diagnosis Date    Asthma     Chronic bilateral low back pain without sciatica 4/26/2021    Depression with anxiety 6/8/2021    Gastroparesis     GERD (gastroesophageal reflux disease)     Headache     Hepatic adenoma     has referral to [facility] Hyperlipidemia     Hypertension     Insulin resistance     Irritable bowel syndrome     Scoliosis     has an S curve     Seasonal [address] [address] 3/2/2021    Wears glasses       Past Surgical History:   Procedure Laterality Date  ADENOIDECTOMY      BRONCHOSCOPY      CHOLECYSTECTOMY  2-1-2013    COLONOSCOPY      SHOULDER SURGERY Left     bone spur    TONSILLECTOMY      TYMPANOSTOMY TUBE PLACEMENT      UPPER GASTROINTESTINAL ENDOSCOPY N/A 6/8/2020    EGD BIOPSY performed by Becki Balderrama MD at [facility]       Family History   Problem Relation Age of Onset    Depression Mother    Daria See Other Mother         pancreatic issues.  High Blood Pressure Mother     High Cholesterol Mother     No Known Problems Father     Other Maternal Grandmother         vascular disease    High Blood Pressure Maternal Grandmother     High Cholesterol Maternal Grandmother     Diabetes Maternal Grandfather     High Cholesterol Maternal Grandfather     No Known Problems Paternal Grandmother     No Known Problems Paternal Grandfather     Other Sister         melonoma    Depression Sister     Anxiety Disorder Sister     Cervical Cancer Maternal Aunt 20       Social History     Tobacco Use    Smoking status: Never Smoker    Smokeless tobacco: Never Used   Substance Use Topics    Alcohol use: Not Currently      Current Outpatient Medications   Medication Sig Dispense Refill    buPROPion (WELLBUTRIN XL) 150 MG extended release tablet Take 1 tablet by mouth every morning 30 tablet 1    CINNAMON PO Take by mouth      VITAMIN E PO Take by mouth      MAGNESIUM MALATE PO Take by mouth      Folic Acid (FOLATE PO) Take by mouth      Ascorbic Acid (VITAMIN C PO) Take by mouth      EPINEPHrine (EPIPEN) 0.3 MG/0.3ML SOAJ injection INJECT INTO THE MIDDLE OF THE OUTER THIGH AND HOLD FOR 3 SECONDS AS NEEDED FOR SEVERE ALLERGIC REACTION THEN CALL 911 IF USED.  2 each 1    pantoprazole (PROTONIX) 40 MG tablet TAKE ONE TABLET BY MOUTH TWICE A  tablet 0    cyclobenzaprine (FLEXERIL) 10 MG tablet       atenolol (TENORMIN) 25 MG tablet Take 1 tablet by mouth daily 30 tablet 5    albuterol sulfate HFA (VENTOLIN HFA) 108 (90 Base) MCG/ACT inhaler Inhale 2 puffs into the lungs 4 times daily as needed for Wheezing 1 Inhaler 5    promethazine (PHENERGAN) 12.5 MG tablet Take 1 tablet by mouth every 6 hours as needed for Nausea 30 tablet 1    famotidine (PEPCID) 40 MG tablet Take 1 tablet by mouth daily 90 tablet 3    montelukast (SINGULAIR) 10 MG tablet Take 1 tablet by mouth nightly 90 tablet 3    baclofen (LIORESAL) 10 MG tablet Take 1 tablet by mouth 3 times daily 90 tablet 2    ALBUTEROL IN Inhale into the lungs      diphenhydrAMINE (BENADRYL ALLERGY) 25 MG tablet Take 50 mg by mouth every 6 hours as needed for Itching       Cetirizine HCl 10 MG CAPS Take by mouth      Cholecalciferol (VITAMIN D3 PO) Take by mouth daily      Omega-3 Fatty Acids (OMEGA 3 PO) Take by mouth (Patient not taking: Reported on 7/13/2021)      naproxen (NAPROSYN) 375 MG tablet Take 1 tablet by mouth 2 times daily (with meals) (Patient not taking: Reported on 6/8/2021) 60 tablet 0    hydrocortisone (ANUSOL-HC) 2.5 % CREA rectal cream Apply to rectum bid x 3 days then PRN for flare of hemorrhoids (Patient not taking: Reported on 6/8/2021) 1 Tube 1     No current facility-administered medications for this visit.      Allergies   Allergen Reactions    Latex Rash    Avocado Anaphylaxis    Banana Anaphylaxis    Amlodipine Swelling    Menthol (Topical Analgesic) [Menthol (Topical Analgesic)] Other (See Comments)     Burning and rash    Sudafed [Pseudoephedrine Hcl]     Augmentin [Amoxicillin-Pot Clavulanate] Nausea And Vomiting    Tape Dariela Rice Tape] Rash       Health Maintenance   Topic Date Due    Hepatitis C screen  Never done    COVID-19 Vaccine (1) Never done    HIV screen  Never done    DTaP/Tdap/Td vaccine (1 - Tdap) Never done    Varicella vaccine (2 of 2 - 13+ 2-dose series) 08/24/2011    Flu vaccine (1) 09/01/2021    Potassium monitoring  04/26/2022    Creatinine monitoring  04/26/2022    Cervical cancer screen  07/23/2023    Hepatitis B vaccine Completed    Hepatitis A vaccine  Aged Out    Hib vaccine  Aged Out    Meningococcal (ACWY) vaccine  Aged Out    Pneumococcal 0-64 years Vaccine  Aged Out       Subjective:      Review of Systems   Constitutional: Negative for chills and fever. HENT: Negative for rhinorrhea and sore throat. Eyes: Negative for discharge and redness. Respiratory: Negative for cough, shortness of breath and wheezing. Cardiovascular: Negative for chest pain and palpitations. Gastrointestinal: Negative for abdominal pain, diarrhea, nausea and vomiting. Genitourinary: Negative for dysuria and frequency. Musculoskeletal: Negative for arthralgias and myalgias. Neurological: Negative for dizziness, light-headedness and headaches. Psychiatric/Behavioral: Negative for sleep disturbance. Objective:     /78   Pulse 79   Ht 5' 3\" (1.6 m)   Wt 249 lb (112.9 kg)   SpO2 98%   BMI 44.11 kg/m²   Physical Exam  Vitals and nursing note reviewed. Constitutional:       Appearance: Normal appearance. She is obese. Comments: Gained 3 pounds in last month   Cardiovascular:      Rate and Rhythm: Normal rate and regular rhythm. Heart sounds: Normal heart sounds. Pulmonary:      Effort: Pulmonary effort is normal.      Breath sounds: Normal breath sounds. Neurological:      Mental Status: She is alert and oriented to person, place, and time. Assessment:       Diagnosis Orders   1. Mixed hyperlipidemia     2. Depression with anxiety          Plan:    Trintellix D/C  Start Wellbutrin XL 150mg 1 po qd  Waiting on Tetanus because she thinks she had it. Going to try chiropractor for her back  Use Naprosyn when needed. Work hard on diet and exercise. Will be joing a weight loss program thru [address]. Return in about 3 months (around 10/13/2021) for 1/2 hour. No orders of the defined types were placed in this encounter.     Orders Placed This Encounter   Medications    [facility] XL) 150 MG extended release tablet     Sig: Take 1 tablet by mouth every morning     Dispense:  30 tablet     Refill:  1       Patient given educational materials - see patient instructions. Discussed use, benefit, and side effects of prescribed medications. All patient questions answered. Pt voiced understanding. Reviewed health maintenance. Instructed to continue current medications, diet and exercise. Patient agreed with treatment plan. Follow up as directed.      Electronically signed by Corina Woods PA-C on 7/13/2021 at 8:31 AM

## 2021-08-16 ENCOUNTER — OFFICE VISIT (OUTPATIENT)
Dept: OBGYN CLINIC | Age: 31
End: 2021-08-16
Payer: COMMERCIAL

## 2021-08-16 ENCOUNTER — HOSPITAL ENCOUNTER (OUTPATIENT)
Age: 31
Setting detail: SPECIMEN
Discharge: HOME | End: 2021-08-16
Payer: COMMERCIAL

## 2021-08-16 VITALS
SYSTOLIC BLOOD PRESSURE: 122 MMHG | DIASTOLIC BLOOD PRESSURE: 74 MMHG | BODY MASS INDEX: 43.05 KG/M2 | HEIGHT: 63 IN | WEIGHT: 243 LBS

## 2021-08-16 DIAGNOSIS — N91.2 AMENORRHEA: ICD-10-CM

## 2021-08-16 DIAGNOSIS — Z01.419 ENCOUNTER FOR GYNECOLOGICAL EXAMINATION: Primary | ICD-10-CM

## 2021-08-16 LAB
PAP SMEAR: ABNORMAL
PROLACTIN: 6.62 UG/L (ref 4.79–23.3)
SEX HORMONE BINDING GLOBULIN: 28 NMOL/L (ref 30–135)
TESTOSTERONE FREE-NONMALE: 2.1 PG/ML (ref 0.8–7.4)
TESTOSTERONE TOTAL: 11 NG/DL (ref 20–70)
TSH SERPL DL<=0.05 MIU/L-ACNC: 2.02 MIU/L (ref 0.3–5)

## 2021-08-16 PROCEDURE — 99395 PREV VISIT EST AGE 18-39: CPT | Performed by: NURSE PRACTITIONER

## 2021-08-16 ASSESSMENT — ENCOUNTER SYMPTOMS
ABDOMINAL DISTENTION: 0
CONSTIPATION: 0
ABDOMINAL PAIN: 0
SHORTNESS OF BREATH: 0
DIARRHEA: 0
COUGH: 0
BACK PAIN: 0

## 2021-08-16 NOTE — PROGRESS NOTES
HPI:     Christy Rice a 36 y.o. female who presents today for:  Chief Complaint   Patient presents with    Annual Exam     Prev Pap: 7/23/20 WNL        HPI  Here for annual exam.  Has adenomas on her liver- seeing [facility]. Has not had a \"real period\" in June of 2019. Has had some spotting 3 or 4 times in the year- lasts only a day or 2. 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 No children. Working on healthy eating. Is very active at work. CC recommended seeing endocrinology and nutrition management, but feels it will take a long time. Offered to refer within [facility] if she would like. Recently  and planning on trying to get pregnant in the near future. Discussed reviewing medications with her providers. Will order pelvic US and order lab work. Discussed use of progesterone only option to regulate cycles/manage endometrial lining. Most interested in POP. Will discuss after US. GYNECOLOGICHISTORY:  MenstrualHistory: Patient's last menstrual period was 07/27/2021. Sexually Transmitted Infections: No  History of Ectopic Pregnancy:No  Menarche: 14  See above  Sexually active: yes  Dyspareunia:    Current Outpatient Medications   Medication Sig Dispense Refill    buPROPion (WELLBUTRIN XL) 150 MG extended release tablet Take 1 tablet by mouth every morning 30 tablet 1    CINNAMON PO Take by mouth      VITAMIN E PO Take by mouth      Folic Acid (FOLATE PO) Take by mouth      Ascorbic Acid (VITAMIN C PO) Take by mouth      EPINEPHrine (EPIPEN) 0.3 MG/0.3ML SOAJ injection INJECT INTO THE MIDDLE OF THE OUTER THIGH AND HOLD FOR 3 SECONDS AS NEEDED FOR SEVERE ALLERGIC REACTION THEN CALL 911 IF USED.  2 each 1    pantoprazole (PROTONIX) 40 MG tablet TAKE ONE TABLET BY MOUTH TWICE A  tablet 0    atenolol (TENORMIN) 25 MG tablet Take 1 tablet by mouth daily 30 tablet 5    albuterol sulfate HFA (VENTOLIN HFA) 108 (90 Base) MCG/ACT inhaler Inhale 2 puffs into the lungs 4 times daily as needed for Wheezing 1 Inhaler 5    promethazine (PHENERGAN) 12.5 MG tablet Take 1 tablet by mouth every 6 hours as needed for Nausea 30 tablet 1    famotidine (PEPCID) 40 MG tablet Take 1 tablet by mouth daily 90 tablet 3    baclofen (LIORESAL) 10 MG tablet Take 1 tablet by mouth 3 times daily 90 tablet 2    ALBUTEROL IN Inhale into the lungs      diphenhydrAMINE (BENADRYL ALLERGY) 25 MG tablet Take 50 mg by mouth every 6 hours as needed for Itching       Cetirizine HCl 10 MG CAPS Take by mouth      Cholecalciferol (VITAMIN D3 PO) Take by mouth daily      montelukast (SINGULAIR) 10 MG tablet TAKE ONE TABLET BY MOUTH ONCE NIGHTLY 90 tablet 3    MAGNESIUM MALATE PO Take by mouth (Patient not taking: Reported on 8/16/2021)      naproxen (NAPROSYN) 375 MG tablet Take 1 tablet by mouth 2 times daily (with meals) (Patient not taking: Reported on 6/8/2021) 60 tablet 0    cyclobenzaprine (FLEXERIL) 10 MG tablet  (Patient not taking: Reported on 8/16/2021)      hydrocortisone (ANUSOL-HC) 2.5 % CREA rectal cream Apply to rectum bid x 3 days then PRN for flare of hemorrhoids (Patient not taking: Reported on 6/8/2021) 1 Tube 1     No current facility-administered medications for this visit.      Allergies   Allergen Reactions    Latex Rash    Avocado Anaphylaxis    Banana Anaphylaxis    Amlodipine Swelling    Menthol (Topical Analgesic) [Menthol (Topical Analgesic)] Other (See Comments)     Burning and rash    Sudafed [Pseudoephedrine Hcl]     Augmentin [Amoxicillin-Pot Clavulanate] Nausea And Vomiting    Tape Ban Pierre Tape] Rash       Past Medical History:   Diagnosis Date    Asthma     Chronic bilateral low back pain without sciatica 4/26/2021    Class 3 severe obesity with serious comorbidity in adult [facility]) 7/13/2021    Depression with anxiety 6/8/2021    Gastroparesis     GERD (gastroesophageal reflux disease)     Headache     Hepatic adenoma     has [facility] to [facility] Hyperlipidemia     Hypertension     Insulin resistance     Irritable bowel syndrome     Scoliosis     has an S curve     Seasonal affective disorder ([address] [address] 3/2/2021    Wears glasses      Denies family history of breast, ovarian, uterus, colon CA     Past Surgical History:   Procedure Laterality Date    ADENOIDECTOMY      BRONCHOSCOPY      CHOLECYSTECTOMY  2-1-2013    COLONOSCOPY      SHOULDER SURGERY Left     bone spur    TONSILLECTOMY      TYMPANOSTOMY TUBE PLACEMENT      UPPER GASTROINTESTINAL ENDOSCOPY N/A 6/8/2020    EGD BIOPSY performed by Bhupinder Lewis MD at [facility]     Family History   Problem Relation Age of Onset    Depression Mother    Kavita Me Other Mother         pancreatic issues.  High Blood Pressure Mother     High Cholesterol Mother     No Known Problems Father     Other Maternal Grandmother         vascular disease    High Blood Pressure Maternal Grandmother     High Cholesterol Maternal Grandmother     Diabetes Maternal Grandfather     High Cholesterol Maternal Grandfather     No Known Problems Paternal Grandmother     No Known Problems Paternal Grandfather     Other Sister         melonoma    Depression Sister     Anxiety Disorder Sister     Cervical Cancer Maternal Aunt 20     Social History     Tobacco Use    Smoking status: Never Smoker    Smokeless tobacco: Never Used   Substance Use Topics    Alcohol use: Not Currently        Subjective:      Review of Systems   Constitutional: Negative for appetite change and fatigue. HENT: Negative for congestion and hearing loss. Eyes: Negative for visual disturbance. Respiratory: Negative for cough and shortness of breath. Cardiovascular: Negative for chest pain and palpitations. Gastrointestinal: Negative for abdominal distention, abdominal pain, constipation and diarrhea. Genitourinary: Negative for flank pain, frequency, menstrual problem, pelvic pain and vaginal discharge. Musculoskeletal: Negative for back pain. Neurological: Negative for syncope and headaches. Psychiatric/Behavioral: Negative for behavioral problems. Objective:     /74 (Position: Sitting, Cuff Size: Large Adult)   Ht 5' 3\" (1.6 m)   Wt 243 lb (110.2 kg)   LMP 07/27/2021 Comment: light spotting  BMI 43.05 kg/m²   Physical Exam  Constitutional:       Appearance: She is well-developed. HENT:      Head: Normocephalic. Eyes:      Extraocular Movements: Extraocular movements intact. Conjunctiva/sclera: Conjunctivae normal.   Neck:      Thyroid: No thyromegaly. Trachea: No tracheal deviation. Pulmonary:      Effort: Pulmonary effort is normal. No respiratory distress. Chest:      Breasts: Breasts are symmetrical.         Right: No inverted nipple. Left: No inverted nipple, mass, nipple discharge, skin change or tenderness. Abdominal:      General: There is no distension. Palpations: Abdomen is soft. There is no mass. Tenderness: There is no abdominal tenderness. Genitourinary:     Labia:         Right: No rash or lesion. Left: No rash or lesion. Vagina: No vaginal discharge or tenderness. Cervix: No cervical motion tenderness, discharge or friability. Uterus: Not deviated, not enlarged and not fixed. Adnexa:         Right: No mass, tenderness or fullness. Left: No mass, tenderness or fullness. Musculoskeletal:         General: No tenderness. Normal range of motion. Cervical back: Normal range of motion. Skin:     General: Skin is warm and dry. Neurological:      General: No focal deficit present. Mental Status: She is alert and oriented to person, place, and time. Mental status is at baseline. Psychiatric:         Mood and Affect: Mood normal.         Behavior: Behavior normal.         Thought Content: Thought content normal.         Judgment: Judgment normal.           Assessment:     1. Encounter for gynecological examination    2.  Amenorrhea Plan:   1. Pap collected. Discussed new pap smear guidelines. Desires re-pap in 1 year. 2. Breast self exam reviewed  3. Calcium and Vitamin D dosing reviewed. 4. Seat belt use reviewed  5. Family planning reviewed.   Birth control condoms  Pelvic US  Prolactin  - free and total testosterone  TSH    Electronicallysigned by SANDY Burnett CNP on 8/16/2021

## 2021-09-10 ENCOUNTER — OFFICE VISIT (OUTPATIENT)
Dept: OBGYN CLINIC | Age: 31
End: 2021-09-10
Payer: COMMERCIAL

## 2021-09-10 ENCOUNTER — HOSPITAL ENCOUNTER (OUTPATIENT)
Age: 31
Setting detail: SPECIMEN
Discharge: HOME | End: 2021-09-10
Payer: COMMERCIAL

## 2021-09-10 VITALS
SYSTOLIC BLOOD PRESSURE: 106 MMHG | BODY MASS INDEX: 43.12 KG/M2 | WEIGHT: 243.4 LBS | HEIGHT: 63 IN | DIASTOLIC BLOOD PRESSURE: 64 MMHG

## 2021-09-10 DIAGNOSIS — R53.83 FATIGUE, UNSPECIFIED TYPE: ICD-10-CM

## 2021-09-10 DIAGNOSIS — N91.4 SECONDARY OLIGOMENORRHEA: ICD-10-CM

## 2021-09-10 DIAGNOSIS — N91.4 SECONDARY OLIGOMENORRHEA: Primary | ICD-10-CM

## 2021-09-10 LAB — FOLLICLE STIMULATING HORMONE: 5.3 U/L (ref 1.7–21.5)

## 2021-09-10 PROCEDURE — 99213 OFFICE O/P EST LOW 20 MIN: CPT | Performed by: NURSE PRACTITIONER

## 2021-09-10 RX ORDER — ACETAMINOPHEN AND CODEINE PHOSPHATE 120; 12 MG/5ML; MG/5ML
1 SOLUTION ORAL DAILY
Qty: 28 TABLET | Refills: 12 | Status: SHIPPED | OUTPATIENT
Start: 2021-09-10 | End: 2022-06-27

## 2021-09-10 RX ORDER — PANTOPRAZOLE SODIUM 40 MG/1
40 TABLET, DELAYED RELEASE ORAL DAILY
Qty: 90 TABLET | Refills: 0 | Status: SHIPPED | OUTPATIENT
Start: 2021-09-10 | End: 2021-12-06

## 2021-09-10 ASSESSMENT — ENCOUNTER SYMPTOMS
ABDOMINAL PAIN: 0
BACK PAIN: 0
SHORTNESS OF BREATH: 0
DIARRHEA: 0
ABDOMINAL DISTENTION: 0
COUGH: 0
CONSTIPATION: 0

## 2021-09-10 NOTE — PATIENT INSTRUCTIONS
Patient Education        norethindrone  Pronunciation:  nor eth IN drone  Brand:  Aygestin, Consuelo, Abimbola, Jolivette, Siomara-Be, Ortho Micronor  What is the most important information I should know about norethindrone? You should not use this medicine if you you have: undiagnosed vaginal bleeding, breast cancer, liver disease, or a liver tumor. You may not be able to take norethindrone if you have ever had a heart attack, a stroke, or blood clot. Do not use if you are pregnant or trying to become pregnant. In some cases, you should not take norethindrone if you are nursing. What is norethindrone? Norethindrone is a form of progesterone, a female hormone important for regulating ovulation and menstruation. Norethindrone is used for birth control (contraception) to prevent pregnancy. Norethindrone is also used to treat menstrual disorders, endometriosis, or abnormal vaginal bleeding caused by a hormone imbalance. Not all brands of this medicine are for the same uses. Some brands are for use only as contraception. Others are for use in treating endometriosis or vaginal bleeding disorders. Avoid medication errors by using only the brand, form, and strength your doctor prescribes. Norethindrone may also be used for other purposes not listed in this medication guide. What should I discuss with my healthcare provider before using norethindrone? You should not use norethindrone if you are allergic to it, or if you have:  · unusual vaginal bleeding that has not been checked by a doctor;  · liver disease or a liver tumor;  · breast cancer; or  · a history of blood clots in your brain, eyes, lungs, or legs. Do not use norethindrone if you are pregnant or trying to become pregnant. Stop taking the medicine and tell your doctor right away if you become pregnant. Ask your doctor about using this medicine while you are breast-feeding. In some cases, you should not take norethindrone if you are nursing.   Tell your doctor if you have ever had:  · heart disease, high blood pressure;  · liver disease;  · depression;  · migraine headaches;  · diabetes;  · high cholesterol or triglycerides;  · uterine fibroid tumors;  · epilepsy;  · kidney disease;  · asthma; or  · if you smoke. Do not give this medicine to a child without medical advice. How should I take norethindrone? Follow all directions on your prescription label and read all medication guides or instruction sheets. Use the medicine exactly as directed. Carefully follow your doctor's dosing instructions about when to start taking norethindrone for contraception if you are switching from a combination birth control pill (estrogen and progestin). If you take norethindrone for contraception: Take one pill every day, no more than 24 hours apart. You may get pregnant if you do not take one pill daily. You may need to use back-up birth control (such as condoms with spermicide) if you are sick with vomiting or diarrhea, or if you are 3 or more hours late in taking your daily dose. If you take norethindrone for menstrual disorders or abnormal vaginal bleeding: You will most likely take the medicine for only 5 to 10 days. Vaginal bleeding will occur 3 to 7 days after your last dose. If you take norethindrone for endometriosis: Norethindrone is usually taken daily long-term for several months. Your doctor may occasionally change your dose. Your doctor should check your progress on a regular basis. Self-examine your breasts for lumps on a monthly basis, and have regular mammograms. Report any unusual vaginal bleeding right away. Norethindrone can affect the results of certain medical tests. Tell any doctor who treats you that you are using norethindrone. Store this medication at room temperature away from moisture, heat, and light. What happens if I miss a dose? Call your doctor for instructions, or follow the patient instructions provided with your medicine.   Missing a birth control pill increases your risk of becoming pregnant. If you are more than 3 hours late for your dose, take the medicine as soon as you remember and use back-up birth control for at least 48 hours. Take your next pill at the regularly scheduled time and continue on your regular dosing schedule. If you miss a period for two months in a row, call your doctor because you might be pregnant. What happens if I overdose? Seek emergency medical attention or call the Poison Help line at 1-417.799.9810. An overdose of norethindrone is not expected to be dangerous. What should I avoid while taking norethindrone? Do not use estrogen medication unless your doctor tells you to. Avoid smoking. It can greatly increase your risk of blood clots, stroke, or heart attack while taking norethindrone for contraception. Norethindrone will not protect you from sexually transmitted diseases--including HIV and AIDS. Using a condom is the only way to protect yourself from these diseases. What are the possible side effects of norethindrone? Get emergency medical help if you have signs of an allergic reaction: hives; difficult breathing; swelling of your face, lips, tongue, or throat. Call your doctor at once if you have:  · sudden vision loss, bulging eyes, or severe headache;  · swelling, rapid weight gain;  · unusual vaginal bleeding;  · missed menstrual periods;  · pelvic pain (especially on one side);  · a breast lump;  · a light-headed feeling, like you might pass out;  · increased thirst, increased urination;  · liver problems --loss of appetite, stomach pain (upper right side), dark urine, jaundice (yellowing of the skin or eyes); or  · signs of a blood clot --sudden numbness or weakness, problems with vision or speech, chest pain, shortness of breath, swelling or redness in an arm or leg.   Common side effects may include:  · irregular vaginal bleeding or spotting;  · headache;  · breast pain or swelling;  · stomach pain, bloating, nausea, vomiting;  · hair loss;  · depressed mood, trouble sleeping;  · weight gain; or  · vaginal itching or discharge. This is not a complete list of side effects and others may occur. Call your doctor for medical advice about side effects. You may report side effects to FDA at 3-201-FDA-3111. What other drugs will affect norethindrone? Some drugs can make norethindrone less effective, which may result in unintended pregnancy if you use this medicine for contraception. Tell your doctor about all your other medicines, especially:  · Katie's wort;  · medicine to treat an infection (antibiotics or antifungal medicine);  · medicine to treat tuberculosis;  · medicine to treat HIV or AIDS; or  · seizure medication. This list is not complete. Other drugs may affect norethindrone, including prescription and over-the-counter medicines, vitamins, and herbal products. Not all possible drug interactions are listed here. Where can I get more information? Your pharmacist can provide more information about norethindrone. Remember, keep this and all other medicines out of the reach of children, never share your medicines with others, and use this medication only for the indication prescribed. Every effort has been made to ensure that the information provided by [address] is accurate, up-to-date, and complete, but no guarantee is made to that effect. Drug information contained herein may be time sensitive. [facility] information has been compiled for use by healthcare practitioners and consumers in the [place] and therefore [facility] does not warrant that uses outside of the [place] are appropriate, unless specifically indicated otherwise. [facility]'s drug information does not endorse drugs, diagnose patients or recommend therapy.  [facility] drug information is an informational resource designed to assist licensed healthcare practitioners in caring for their patients and/or to serve consumers viewing this service as a supplement to, and not a substitute for, the expertise, skill, knowledge and judgment of healthcare practitioners. The absence of a warning for a given drug or drug combination in no way should be construed to indicate that the drug or drug combination is safe, effective or appropriate for any given patient. [facility] does not assume any responsibility for any aspect of healthcare administered with the aid of information [facility] provides. The information contained herein is not intended to cover all possible uses, directions, precautions, warnings, drug interactions, allergic reactions, or adverse effects. If you have questions about the drugs you are taking, check with your doctor, nurse or pharmacist.  Copyright 2003-9788 [address]. Version: 3.01. Revision date: 2/18/2019. Care instructions adapted under license by [facility] ([facility]). If you have questions about a medical condition or this instruction, always ask your healthcare professional. [place] [address] any warranty or liability for your use of this information.

## 2021-09-10 NOTE — PROGRESS NOTES
Subjective:      Patient ID: Giulia Evans is being seen today for   Chief Complaint   Patient presents with    Results     discuss US results - amenorrhea - had labs 8/16/21       HPI  Here for FU of oligomenorrhea and US results. US shows dominant follicle and 3 cm right ovarian cyst. No \"real\" cycle in 27 months, but had some spotting in April. Discussed starting POP's to help regulate her periods and will order [facility]. Testosterone levels low, prolactin wnl. Micronor discussed and sent to pharmacy. Pt has FU w/endo and nutrition at [facility]. [facility] 2.2 in 1/2020. C/O some fatigue and asking to have B-12 checked along w/FSH. Review of Systems   Constitutional: Negative for appetite change and fatigue. HENT: Negative for congestion and hearing loss. Eyes: Negative for visual disturbance. Respiratory: Negative for cough and shortness of breath. Cardiovascular: Negative for chest pain and palpitations. Gastrointestinal: Negative for abdominal distention, abdominal pain, constipation and diarrhea. Genitourinary: Positive for menstrual problem (oligomenorrhea). Negative for flank pain, frequency, pelvic pain and vaginal discharge. Musculoskeletal: Negative for back pain. Neurological: Negative for syncope and headaches. Psychiatric/Behavioral: Negative for behavioral problems.        Vitals:    09/10/21 0904   BP: 106/64   Site: Right Upper Arm   Position: Sitting   Cuff Size: Large Adult   Weight: 243 lb 6.4 oz (110.4 kg)   Height: 5' 3\" (1.6 m)     Current Outpatient Medications   Medication Sig Dispense Refill    montelukast (SINGULAIR) 10 MG tablet TAKE ONE TABLET BY MOUTH ONCE NIGHTLY 90 tablet 3    buPROPion (WELLBUTRIN XL) 150 MG extended release tablet Take 1 tablet by mouth every morning 30 tablet 1    CINNAMON PO Take by mouth      MAGNESIUM MALATE PO Take by mouth       Folic Acid (FOLATE PO) Take by mouth      Ascorbic Acid (VITAMIN C PO) Take by mouth      naproxen (NAPROSYN) 375 MG tablet Take 1 tablet by mouth 2 times daily (with meals) 60 tablet 0    EPINEPHrine (EPIPEN) 0.3 MG/0.3ML SOAJ injection INJECT INTO THE MIDDLE OF THE OUTER THIGH AND HOLD FOR 3 SECONDS AS NEEDED FOR SEVERE ALLERGIC REACTION THEN CALL 911 IF USED. 2 each 1    pantoprazole (PROTONIX) 40 MG tablet TAKE ONE TABLET BY MOUTH TWICE A  tablet 0    cyclobenzaprine (FLEXERIL) 10 MG tablet       atenolol (TENORMIN) 25 MG tablet Take 1 tablet by mouth daily 30 tablet 5    albuterol sulfate HFA (VENTOLIN HFA) 108 (90 Base) MCG/ACT inhaler Inhale 2 puffs into the lungs 4 times daily as needed for Wheezing 1 Inhaler 5    promethazine (PHENERGAN) 12.5 MG tablet Take 1 tablet by mouth every 6 hours as needed for Nausea 30 tablet 1    famotidine (PEPCID) 40 MG tablet Take 1 tablet by mouth daily 90 tablet 3    baclofen (LIORESAL) 10 MG tablet Take 1 tablet by mouth 3 times daily 90 tablet 2    ALBUTEROL IN Inhale into the lungs      diphenhydrAMINE (BENADRYL ALLERGY) 25 MG tablet Take 50 mg by mouth every 6 hours as needed for Itching       hydrocortisone (ANUSOL-HC) 2.5 % CREA rectal cream Apply to rectum bid x 3 days then PRN for flare of hemorrhoids 1 Tube 1    Cetirizine HCl 10 MG CAPS Take by mouth      Cholecalciferol (VITAMIN D3 PO) Take by mouth daily       No current facility-administered medications for this visit. Allergies   Allergen Reactions    Latex Rash    Avocado Anaphylaxis    Banana Anaphylaxis    Amlodipine Swelling    Menthol (Topical Analgesic) [Menthol (Topical Analgesic)] Other (See Comments)     Burning and rash    Sudafed [Pseudoephedrine Hcl]     Augmentin [Amoxicillin-Pot Clavulanate] Nausea And Vomiting    Tape Chele Vanesa Tape] Rash       Objective:   Physical Exam  Constitutional:       Appearance: Normal appearance. She is normal weight. HENT:      Head: Normocephalic. Eyes:      Extraocular Movements: Extraocular movements intact.       Conjunctiva/sclera: Conjunctivae normal.   Pulmonary:      Effort: Pulmonary effort is normal.   Abdominal:      General: Abdomen is flat. Palpations: Abdomen is soft. Musculoskeletal:         General: Normal range of motion. Cervical back: Normal range of motion. Neurological:      General: No focal deficit present. Mental Status: She is alert and oriented to person, place, and time. Mental status is at baseline. Skin:     General: Skin is warm and dry. Psychiatric:         Mood and Affect: Mood normal.         Behavior: Behavior normal.         Thought Content: Thought content normal.         Judgment: Judgment normal.         Assessment:      1. Secondary oligomenorrhea    2. Fatigue, unspecified type          Plan:      Patient is a 36 y.o. Susana Jensen  seen with total face to face time of 15 minutes. More than 50% of this visit was on counseling and education regarding her    Diagnosis Orders   1. Secondary oligomenorrhea  Follicle Stimulating Hormone   2. Fatigue, unspecified type  Vitamin B12    and her options. She was also counseled on her preventative health maintenance recommendations and follow-up of annual exam. Refer to plan and assessment.     Recent Results (from the past 8736 hour(s))   COVID-19 Ambulatory    Collection Time: 12/03/20 12:20 PM    Specimen: Nasopharyngeal Swab   Result Value Ref Range    SARS-CoV-2, MARTHA Not Detected Not Detected   Comprehensive Metabolic Panel    Collection Time: 04/26/21  4:06 PM   Result Value Ref Range    Glucose 104 (H) 70 - 99 mg/dL    BUN 10 6 - 20 mg/dL    CREATININE 0.65 0.50 - 0.90 mg/dL    Bun/Cre Ratio NOT REPORTED 9 - 20    Calcium 9.6 8.6 - 10.4 mg/dL    Sodium 134 (L) 135 - 144 mmol/L    Potassium 4.4 3.7 - 5.3 mmol/L    Chloride 100 98 - 107 mmol/L    CO2 27 20 - 31 mmol/L    Anion Gap 7 (L) 9 - 17 mmol/L    Alkaline Phosphatase 48 35 - 104 U/L    ALT 22 5 - 33 U/L    AST 18 <32 U/L    Total Bilirubin 0.24 (L) 0.3 - 1.2 mg/dL    Total Protein 7.0 6.4 - 8.3 g/dL    Albumin 4.2 3.5 - 5.2 g/dL    Albumin/Globulin Ratio 1.5 1.0 - 2.5    GFR Non-African American >60 >60 mL/min    GFR African American >60 >60 mL/min    GFR Comment          GFR Staging NOT REPORTED    Lipid Panel    Collection Time: 06/07/21  1:00 PM   Result Value Ref Range    Cholesterol 272 (H) <200 mg/dL    HDL 41 >40 mg/dL    LDL Cholesterol 190 (H) 0 - 130 mg/dL    Chol/HDL Ratio 6.6 (H) <5    Triglycerides 205 (H) <150 mg/dL    VLDL NOT REPORTED (H) 1 - 30 mg/dL   Sex Hormone Binding Globulin    Collection Time: 06/07/21  1:00 PM   Result Value Ref Range    Sex Hormone Binding 30 30 - 135 nmol/L   Glucose Tolerance, 2 Hrs    Collection Time: 06/07/21  1:00 PM   Result Value Ref Range    Amount Glucose Given 75G g    Glucose, Fasting 84 65 - 99 mg/dL    Glucose, GTT - 1 Hour 138 65 - 184 mg/dL    Glucose, GTT - 2 Hour 106 65 - 139 mg/dL   17-Hydroxyprogesterone    Collection Time: 06/07/21  1:00 PM   Result Value Ref Range    17 OH Progesterone 20.37 <=206.00 ng/dL   Glucose, random    Collection Time: 06/07/21  1:00 PM   Result Value Ref Range    Glucose 86 70 - 99 mg/dL   Prolactin    Collection Time: 08/16/21  8:12 AM   Result Value Ref Range    Prolactin 6.62 4.79 - 23.30 ug/L   TSH with Reflex    Collection Time: 08/16/21  8:12 AM   Result Value Ref Range    TSH 2.02 0.30 - 5.00 mIU/L   Testosterone, Free    Collection Time: 08/16/21  8:12 AM   Result Value Ref Range    Testosterone 11 (L) 20 - 70 ng/dL    Sex Hormone Binding 28 (L) 30 - 135 nmol/L    Testosterone, Free 2.1 0.8 - 7.4 pg/mL         PLAN:   micronor to pharmacy. Should have monthly periods by 3 months.   Call if no bleeding after 2 months of pills  FSH, B-12  FU w/endo/nutrition at   FU annual exam and prn

## 2021-09-11 LAB — VITAMIN B-12: 301 PG/ML (ref 232–1245)

## 2021-09-14 DIAGNOSIS — Z01.419 ENCOUNTER FOR GYNECOLOGICAL EXAMINATION: ICD-10-CM

## 2021-10-04 ENCOUNTER — PROCEDURE VISIT (OUTPATIENT)
Dept: OBGYN CLINIC | Age: 31
End: 2021-10-04
Payer: COMMERCIAL

## 2021-10-04 VITALS
DIASTOLIC BLOOD PRESSURE: 82 MMHG | HEIGHT: 63 IN | SYSTOLIC BLOOD PRESSURE: 112 MMHG | WEIGHT: 243 LBS | BODY MASS INDEX: 43.05 KG/M2

## 2021-10-04 DIAGNOSIS — R87.619 ATYPICAL GLANDULAR CELLS OF UNDETERMINED SIGNIFICANCE (AGUS) ON CERVICAL PAP SMEAR: Primary | ICD-10-CM

## 2021-10-04 LAB
CONTROL: PRESENT
PREGNANCY TEST URINE, POC: NEGATIVE

## 2021-10-04 PROCEDURE — 81025 URINE PREGNANCY TEST: CPT | Performed by: OBSTETRICS & GYNECOLOGY

## 2021-10-04 PROCEDURE — 57454 BX/CURETT OF CERVIX W/SCOPE: CPT | Performed by: OBSTETRICS & GYNECOLOGY

## 2021-10-04 NOTE — PROGRESS NOTES
[facility]   Procedure Note    Joshua Hayden  10/4/2021                       Primary Care Physician: Sabino Rose PA-C      Subjective:   Joshua Hayden 36 y.o. female Mando Menendez is here for previously scheduled colposcopy, endometrial biopsu due to history of atypical glandular cells. Patient reports oligomenorrhea since 2019. Patient has had an [address] which was only significant for endometrial lining of 0.96 cm. Labs were WNL. Patient started on OCPs two weeks ago- no withdrawal bleed yet. Patient does report that she notes monthly changes to her cervical mucus but no bleeding. Vitals:   Vitals:    10/04/21 1404   BP: 112/82   Position: Sitting   Cuff Size: Large Adult   Weight: 243 lb (110.2 kg)   Height: 5' 3\" (1.6 m)       Procedure: Endometrial biopsy, Colposcopy, endocervical curettage     Indications: Atypical glandular cells on pap smear     Procedure Details: The patient was counseled on the procedure. Risks, benefits and alternatives were reviewed. The patient is aware that this is diagnostic and not curative and a second procedure may be needed. A consent was reviewed and obtained. The patient was positioned comfortably on the exam table. Colposcopy, Endocervical Curettage  A sterile speculum was placed into the vagina and the cervix was identified. The colposcope was positioned and the cervix was examined revealing no visible lesions. Acetoacetic acid was applied to the cervix, revealing no visible lesions. Lugol's Iodine was applied to the cervix revealing  no visible lesions. The exam was considered to be unsatisfactory with the transformation zone visualized. Endocervical curettage was then performed and tissue collected was sent to pathology. Endometrial Biopsy  Tthe aspirator was then gently passed into the endometrial cavity x2. Tissue was obtained and sent to pathology. The patient tolerated the procedure well.  Post procedure restrictions were reviewed and given

## 2021-10-06 ENCOUNTER — TELEPHONE (OUTPATIENT)
Dept: OBGYN CLINIC | Age: 31
End: 2021-10-06

## 2021-10-06 NOTE — TELEPHONE ENCOUNTER
35 y/o Pt needs letter stating she was seen in ofc monday 10/04/21and needs note faxed to work that she was here. [phone]     Fax Letter to [employer] attn to [address]. Pt had appt with  and is aware Abimbola FROST will be signing letter of confirmation that she was seen in office 10/04/21.

## 2021-10-08 ENCOUNTER — HOSPITAL ENCOUNTER (EMERGENCY)
Facility: CLINIC | Age: 31
Discharge: HOME | End: 2021-10-08
Attending: EMERGENCY MEDICINE
Payer: COMMERCIAL

## 2021-10-08 ENCOUNTER — APPOINTMENT (OUTPATIENT)
Dept: CT IMAGING | Facility: CLINIC | Age: 31
End: 2021-10-08
Payer: COMMERCIAL

## 2021-10-08 VITALS
OXYGEN SATURATION: 99 % | TEMPERATURE: 98.5 F | DIASTOLIC BLOOD PRESSURE: 98 MMHG | BODY MASS INDEX: 43.05 KG/M2 | WEIGHT: 243 LBS | SYSTOLIC BLOOD PRESSURE: 133 MMHG | RESPIRATION RATE: 18 BRPM | HEART RATE: 86 BPM

## 2021-10-08 DIAGNOSIS — R42 LIGHTHEADEDNESS: Primary | ICD-10-CM

## 2021-10-08 LAB
ABSOLUTE EOS #: 0.1 K/UL (ref 0–0.4)
ABSOLUTE IMMATURE GRANULOCYTE: ABNORMAL K/UL (ref 0–0.3)
ABSOLUTE LYMPH #: 2.1 K/UL (ref 1–4.8)
ABSOLUTE MONO #: 0.8 K/UL (ref 0.1–1.2)
ANION GAP SERPL CALCULATED.3IONS-SCNC: 11 MMOL/L (ref 9–17)
BASOPHILS # BLD: 1 % (ref 0–2)
BASOPHILS ABSOLUTE: 0 K/UL (ref 0–0.2)
BILIRUBIN URINE: NEGATIVE
BUN BLDV-MCNC: 14 MG/DL (ref 6–20)
BUN/CREAT BLD: NORMAL (ref 9–20)
CALCIUM SERPL-MCNC: 9.8 MG/DL (ref 8.6–10.4)
CHLORIDE BLD-SCNC: 104 MMOL/L (ref 98–107)
CO2: 25 MMOL/L (ref 20–31)
COLOR: YELLOW
COMMENT UA: NORMAL
CREAT SERPL-MCNC: 0.7 MG/DL (ref 0.5–0.9)
D-DIMER QUANTITATIVE: 0.21 MG/L FEU
DIFFERENTIAL TYPE: ABNORMAL
EOSINOPHILS RELATIVE PERCENT: 1 % (ref 1–4)
GFR AFRICAN AMERICAN: >60 ML/MIN
GFR NON-AFRICAN AMERICAN: >60 ML/MIN
GFR SERPL CREATININE-BSD FRML MDRD: NORMAL ML/MIN/{1.73_M2}
GFR SERPL CREATININE-BSD FRML MDRD: NORMAL ML/MIN/{1.73_M2}
GLUCOSE BLD-MCNC: 85 MG/DL (ref 70–99)
GLUCOSE URINE: NEGATIVE
HCG QUALITATIVE: NEGATIVE
HCT VFR BLD CALC: 41.7 % (ref 36–46)
HEMOGLOBIN: 14.2 G/DL (ref 12–16)
IMMATURE GRANULOCYTES: ABNORMAL %
KETONES, URINE: NEGATIVE
LEUKOCYTE ESTERASE, URINE: NEGATIVE
LYMPHOCYTES # BLD: 30 % (ref 24–44)
MCH RBC QN AUTO: 32.8 PG (ref 26–34)
MCHC RBC AUTO-ENTMCNC: 34 G/DL (ref 31–37)
MCV RBC AUTO: 96.4 FL (ref 80–100)
MONOCYTES # BLD: 12 % (ref 2–11)
NITRITE, URINE: NEGATIVE
NRBC AUTOMATED: ABNORMAL PER 100 WBC
PDW BLD-RTO: 12.5 % (ref 12.5–15.4)
PH UA: 6 (ref 5–8)
PLATELET # BLD: 298 K/UL (ref 140–450)
PLATELET ESTIMATE: ABNORMAL
PMV BLD AUTO: 7.9 FL (ref 6–12)
POTASSIUM SERPL-SCNC: 4.1 MMOL/L (ref 3.7–5.3)
PROTEIN UA: NEGATIVE
RBC # BLD: 4.33 M/UL (ref 4–5.2)
RBC # BLD: ABNORMAL 10*6/UL
SEG NEUTROPHILS: 56 % (ref 36–66)
SEGMENTED NEUTROPHILS ABSOLUTE COUNT: 3.9 K/UL (ref 1.8–7.7)
SODIUM BLD-SCNC: 140 MMOL/L (ref 135–144)
SPECIFIC GRAVITY UA: 1.02 (ref 1–1.03)
THYROXINE, FREE: 0.88 NG/DL (ref 0.93–1.7)
TSH SERPL DL<=0.05 MIU/L-ACNC: 1.44 MIU/L (ref 0.3–5)
TURBIDITY: CLEAR
URINE HGB: NEGATIVE
UROBILINOGEN, URINE: NORMAL
WBC # BLD: 6.9 K/UL (ref 3.5–11)
WBC # BLD: ABNORMAL 10*3/UL

## 2021-10-08 PROCEDURE — 81003 URINALYSIS AUTO W/O SCOPE: CPT

## 2021-10-08 PROCEDURE — 84443 ASSAY THYROID STIM HORMONE: CPT

## 2021-10-08 PROCEDURE — 99283 EMERGENCY DEPT VISIT LOW MDM: CPT

## 2021-10-08 PROCEDURE — 84703 CHORIONIC GONADOTROPIN ASSAY: CPT

## 2021-10-08 PROCEDURE — 70450 CT HEAD/BRAIN W/O DYE: CPT

## 2021-10-08 PROCEDURE — 36415 COLL VENOUS BLD VENIPUNCTURE: CPT

## 2021-10-08 PROCEDURE — 96374 THER/PROPH/DIAG INJ IV PUSH: CPT

## 2021-10-08 PROCEDURE — 84439 ASSAY OF FREE THYROXINE: CPT

## 2021-10-08 PROCEDURE — 80048 BASIC METABOLIC PNL TOTAL CA: CPT

## 2021-10-08 PROCEDURE — 85025 COMPLETE CBC W/AUTO DIFF WBC: CPT

## 2021-10-08 PROCEDURE — 6360000002 HC RX W HCPCS: Performed by: NURSE PRACTITIONER

## 2021-10-08 PROCEDURE — 2580000003 HC RX 258: Performed by: NURSE PRACTITIONER

## 2021-10-08 PROCEDURE — 85379 FIBRIN DEGRADATION QUANT: CPT

## 2021-10-08 PROCEDURE — 93005 ELECTROCARDIOGRAM TRACING: CPT | Performed by: NURSE PRACTITIONER

## 2021-10-08 RX ORDER — ONDANSETRON 2 MG/ML
4 INJECTION INTRAMUSCULAR; INTRAVENOUS ONCE
Status: COMPLETED | OUTPATIENT
Start: 2021-10-08 | End: 2021-10-08

## 2021-10-08 RX ORDER — 0.9 % SODIUM CHLORIDE 0.9 %
1000 INTRAVENOUS SOLUTION INTRAVENOUS ONCE
Status: COMPLETED | OUTPATIENT
Start: 2021-10-08 | End: 2021-10-08

## 2021-10-08 RX ADMIN — ONDANSETRON 4 MG: 2 INJECTION INTRAMUSCULAR; INTRAVENOUS at 15:12

## 2021-10-08 RX ADMIN — SODIUM CHLORIDE 1000 ML: 9 INJECTION, SOLUTION INTRAVENOUS at 14:54

## 2021-10-08 ASSESSMENT — ENCOUNTER SYMPTOMS
RHINORRHEA: 0
SINUS PRESSURE: 0
SORE THROAT: 0
NAUSEA: 0
ABDOMINAL PAIN: 0
COUGH: 0
CONSTIPATION: 0
WHEEZING: 0
VOMITING: 0
DIARRHEA: 0
SHORTNESS OF BREATH: 0
COLOR CHANGE: 0

## 2021-10-08 ASSESSMENT — PAIN DESCRIPTION - ONSET: ONSET: GRADUAL

## 2021-10-08 ASSESSMENT — PAIN DESCRIPTION - LOCATION: LOCATION: ABDOMEN

## 2021-10-08 ASSESSMENT — PAIN DESCRIPTION - PROGRESSION: CLINICAL_PROGRESSION: GRADUALLY WORSENING

## 2021-10-08 ASSESSMENT — PAIN - FUNCTIONAL ASSESSMENT: PAIN_FUNCTIONAL_ASSESSMENT: ACTIVITIES ARE NOT PREVENTED

## 2021-10-08 ASSESSMENT — PAIN DESCRIPTION - ORIENTATION: ORIENTATION: MID;LOWER

## 2021-10-08 ASSESSMENT — PAIN DESCRIPTION - FREQUENCY: FREQUENCY: CONTINUOUS

## 2021-10-08 ASSESSMENT — PAIN DESCRIPTION - DESCRIPTORS: DESCRIPTORS: CRAMPING

## 2021-10-08 ASSESSMENT — PAIN DESCRIPTION - PAIN TYPE: TYPE: ACUTE PAIN

## 2021-10-08 NOTE — ED PROVIDER NOTES
Attending Supervisory Note/Shared Visit   I have personally performed a face to face diagnostic evaluation on this patient. I have reviewed the mid-levels findings and agree.         (Please note that portions of this note were completed with a voice recognition program.  Efforts were made to edit the dictations but occasionally words are mis-transcribed.)    Cony Hernandez MD  Attending Emergency Physician      Cony Hernandez MD  10/08/21 2163

## 2021-10-08 NOTE — LETTER
[facility] ED  [address] [facility]  Phone: [phone]               October 8, 2021    Patient: Tamela Phillips   YOB: 1990   Date of Visit: 10/8/2021       To Whom It May Concern:    Amara Westfall was seen and treated in our emergency department on 10/8/2021. She may return to work on 10/10/21.       Sincerely,       Serina Tobar, RN         Signature:__________________________________

## 2021-10-08 NOTE — ED NOTES
Writer with 2 unsuccessful IV attempts. Stanley Garnica RN to attempt.         Alli Arita, GIGI  10/08/21 [id number]

## 2021-10-08 NOTE — ED PROVIDER NOTES
[address] [facility] ED  eMERGENCY dEPARTMENT eNCOUnter      Pt Name: Ishan Brown  MRN: [id number]  [place] 1990  Date of evaluation: 10/8/2021  Provider: Allie Joseph NP, SANDY Justin [id number]       Chief Complaint   Patient presents with    Dizziness     around 1300 today         HISTORY OF PRESENT ILLNESS  (Location/Symptom, Timing/Onset, Context/Setting, Quality, Duration, Modifying Factors, Severity.)   Ishan Brown is a 36 y.o. female who presents to the emergency department private vehicle for evaluation of lightheadedness. Patient states that she woke up and felt lightheaded and did not feel right around 1:00 this afternoon. She states that she was at work and she started to feel little lightheaded. She states that her coworkers told her that she looked pale. She went to go sit down. States that she just feels foggy. She states that she felt like she had trouble getting her words out. She ate breakfast had a Elaine Gomez for a snack prior to this happening. She also did receive her first Covid vaccine on Tuesday. She denies any other new medications. She states that she did recently have an endometrial biopsy. She denies any abnormal bleeding. She denies fevers or chills she denies chest pain or palpitations. Nursing Notes were reviewed.     ALLERGIES     Latex, Avocado, Banana, Amlodipine, Menthol (topical analgesic) [menthol (topical analgesic)], Sudafed [pseudoephedrine hcl], Augmentin [amoxicillin-pot clavulanate], and Tape [adhesive tape]    CURRENT MEDICATIONS       Discharge Medication List as of 10/8/2021  3:49 PM      CONTINUE these medications which have NOT CHANGED    Details   famotidine (PEPCID) 40 MG tablet TAKE ONE TABLET BY MOUTH DAILY, Disp-90 tablet, R-0Normal      buPROPion (WELLBUTRIN XL) 150 MG extended release tablet TAKE ONE TABLET BY MOUTH EVERY MORNING, Disp-30 tablet, R-5Normal      norethindrone (ORTHO MICRONOR) 0.35 MG tablet Take 1 tablet by mouth daily for 28 days, Disp-28 tablet, R-12Normal      pantoprazole (PROTONIX) 40 MG tablet Take 1 tablet by mouth daily, Disp-90 tablet, R-0Normal      montelukast (SINGULAIR) 10 MG tablet TAKE ONE TABLET BY MOUTH ONCE NIGHTLY, Disp-90 tablet, R-3Normal      CINNAMON PO Take by mouthHistorical Med      MAGNESIUM MALATE PO Take by mouth Historical Med      Folic Acid (FOLATE PO) Take by mouthHistorical Med      Ascorbic Acid (VITAMIN C PO) Take by mouthHistorical Med      naproxen (NAPROSYN) 375 MG tablet Take 1 tablet by mouth 2 times daily (with meals), Disp-60 tablet, R-0Normal      EPINEPHrine (EPIPEN) 0.3 MG/0.3ML SOAJ injection INJECT INTO THE MIDDLE OF THE OUTER THIGH AND HOLD FOR 3 SECONDS AS NEEDED FOR SEVERE ALLERGIC REACTION THEN CALL 911 IF USED., Disp-2 each, R-1Normal      cyclobenzaprine (FLEXERIL) 10 MG tablet Historical Med      atenolol (TENORMIN) 25 MG tablet Take 1 tablet by mouth daily, Disp-30 tablet, R-5Normal      albuterol sulfate HFA (VENTOLIN HFA) 108 (90 Base) MCG/ACT inhaler Inhale 2 puffs into the lungs 4 times daily as needed for Wheezing, Disp-1 Inhaler,R-5Normal      promethazine (PHENERGAN) 12.5 MG tablet Take 1 tablet by mouth every 6 hours as needed for Nausea, Disp-30 tablet,R-1Normal      baclofen (LIORESAL) 10 MG tablet Take 1 tablet by mouth 3 times daily, Disp-90 tablet,R-2Normal      ALBUTEROL IN Inhale into the lungsHistorical Med      diphenhydrAMINE (BENADRYL ALLERGY) 25 MG tablet Take 50 mg by mouth every 6 hours as needed for Itching Historical Med      hydrocortisone (ANUSOL-HC) 2.5 % CREA rectal cream Apply to rectum bid x 3 days then PRN for flare of hemorrhoids, Disp-1 Tube, R-1, Normal      Cetirizine HCl 10 MG CAPS Take by mouthHistorical Med      Cholecalciferol (VITAMIN D3 PO) Take by mouth dailyHistorical Med             PAST MEDICAL HISTORY         Diagnosis Date    Asthma     Chronic bilateral low back pain without sciatica 4/26/2021    Class 3 severe obesity with serious comorbidity in adult [facility]) 2021    Depression with anxiety 2021    Gastroparesis     GERD (gastroesophageal reflux disease)     Headache     Hepatic adenoma     has referral to [facility] Hyperlipidemia     Hypertension     Insulin resistance     Irritable bowel syndrome     Scoliosis     has an S curve     Seasonal affective disorder ([facility] [address] 3/2/2021    Wears glasses        SURGICAL HISTORY           Procedure Laterality Date    ADENOIDECTOMY      BRONCHOSCOPY      CHOLECYSTECTOMY  2013    COLONOSCOPY      SHOULDER SURGERY Left     bone spur    TONSILLECTOMY      TYMPANOSTOMY TUBE PLACEMENT      UPPER GASTROINTESTINAL ENDOSCOPY N/A 2020    EGD BIOPSY performed by Laine Batista MD at [facility]         FAMILY HISTORY           Problem Relation Age of Onset    Depression Mother    Yasmine Penaloza Other Mother         pancreatic issues.  High Blood Pressure Mother     High Cholesterol Mother     No Known Problems Father     Other Maternal Grandmother         vascular disease    High Blood Pressure Maternal Grandmother     High Cholesterol Maternal Grandmother     Diabetes Maternal Grandfather     High Cholesterol Maternal Grandfather     No Known Problems Paternal Grandmother     No Known Problems Paternal Grandfather     Other Sister         melonoma    Depression Sister     Anxiety Disorder Sister     Cervical Cancer Maternal Aunt 21     Family Status   Relation Name Status    Mother  Alive    Father unknown Other    MGM  Alive    MGF      PGM unknown Other    PGF unknown Other    Sister  (Not Specified)    MAunt  Alive        SOCIAL HISTORY      reports that she has never smoked. She has never used smokeless tobacco. She reports previous alcohol use. She reports that she does not use drugs.     REVIEW OF SYSTEMS    (2-9 systems for level 4, 10 or more for level 5)     Review of Systems   Constitutional: Negative for chills, fever and unexpected weight change. HENT: Negative for congestion, rhinorrhea, sinus pressure and sore throat. Respiratory: Negative for cough, shortness of breath and wheezing. Cardiovascular: Negative for chest pain and palpitations. Gastrointestinal: Negative for abdominal pain, constipation, diarrhea, nausea and vomiting. Genitourinary: Negative for dysuria and hematuria. Musculoskeletal: Negative for arthralgias and myalgias. Skin: Negative for color change and rash. Neurological: Negative for dizziness, weakness and headaches. Hematological: Negative for adenopathy. All other systems reviewed and are negative. Except as noted above the remainder of the review of systems was reviewed and negative. PHYSICAL EXAM    (up to 7 for level 4, 8 or more for level 5)     ED Triage Vitals   BP Temp Temp Source Pulse Resp SpO2 Height Weight   10/08/21 1411 10/08/21 1413 10/08/21 1413 10/08/21 1411 10/08/21 1411 10/08/21 1411 -- 10/08/21 1411   (!) 133/98 98.5 °F (36.9 °C) Oral 86 18 99 %  243 lb (110.2 kg)       Physical Exam  Vitals reviewed. Constitutional:       Appearance: She is well-developed. HENT:      Head: Normocephalic and atraumatic. Eyes:      Conjunctiva/sclera: Conjunctivae normal.      Pupils: Pupils are equal, round, and reactive to light. Cardiovascular:      Rate and Rhythm: Normal rate and regular rhythm. Pulmonary:      Effort: Pulmonary effort is normal. No respiratory distress. Breath sounds: Normal breath sounds. No stridor. Abdominal:      General: Bowel sounds are normal.      Palpations: Abdomen is soft. Musculoskeletal:         General: Normal range of motion. Cervical back: Normal range of motion and neck supple. Lymphadenopathy:      Cervical: No cervical adenopathy. Skin:     General: Skin is warm and dry. Findings: No rash. Neurological:      Mental Status: She is alert and oriented to person, place, and time. RADIOLOGY:   Non-plain film images such as CT, Ultrasound and MRI are read by the radiologist. Declan Kemp radiographic images are visualized and preliminarily interpreted by the emergency physician with the below findings:  CT HEAD WO CONTRAST    Result Date: 10/8/2021  EXAMINATION: CT OF THE HEAD WITHOUT CONTRAST  10/8/2021 2:54 pm TECHNIQUE: CT of the head was performed without the administration of intravenous contrast. Dose modulation, iterative reconstruction, and/or weight based adjustment of the mA/kV was utilized to reduce the radiation dose to as low as reasonably achievable. COMPARISON: None. HISTORY: ORDERING SYSTEM PROVIDED HISTORY: Syncope, weakness TECHNOLOGIST PROVIDED HISTORY: Syncope, weakness Decision Support Exception - unselect if not a suspected or confirmed emergency medical condition->Emergency Medical Condition (MA) Is the patient pregnant?->No Reason for Exam: Syncope, dizziness Acuity: Acute Type of Exam: Initial FINDINGS: BRAIN/VENTRICLES: There is no acute intracranial hemorrhage, mass effect or midline shift. No abnormal extra-axial fluid collection. The gray-white differentiation is maintained without evidence of an acute infarct. There is no evidence of hydrocephalus. ORBITS: The visualized portion of the orbits demonstrate no acute abnormality. SINUSES: The visualized paranasal sinuses and mastoid air cells demonstrate no acute abnormality. SOFT TISSUES/SKULL:  No acute abnormality of the visualized skull or soft tissues. No acute intracranial abnormality. US PELVIS COMPLETE NON-OB TRANSABDOMINAL AND TRANSVAGINAL    Result Date: 9/10/2021  Uterus measures 10 cm with 0.96 cm endometrial lining. Left ovary contains a simple cyst measuring 3.26 x 3.39 x 3.35 cm. Right ovary contains a dominant follicle. No pelvic free fluid appreciated.  Can repeat US in 12 weeks for resolution of simple cyst.    Interpretation per the Radiologist below, if available at the time of this note:    CT HEAD WO CONTRAST   Final Result   No acute intracranial abnormality. LABS:  Labs Reviewed   CBC WITH AUTO DIFFERENTIAL - Abnormal; Notable for the following components:       Result Value    Monocytes 12 (*)     All other components within normal limits   BASIC METABOLIC PANEL   HCG, SERUM, QUALITATIVE   URINALYSIS   D-DIMER, QUANTITATIVE   T4, FREE   TSH WITHOUT REFLEX       All other labs were within normal range or not returned as of this dictation. EMERGENCY DEPARTMENT COURSE and DIFFERENTIAL DIAGNOSIS/MDM:   Vitals:    Vitals:    10/08/21 1411 10/08/21 1413   BP: (!) 133/98    Pulse: 86    Resp: 18    Temp:  98.5 °F (36.9 °C)   TempSrc:  Oral   SpO2: 99%    Weight: 110.2 kg (243 lb)        Medical Decision Making: Was given IV fluids and medication. She feels much better. Her laboratory studies and urine pregnant are negative. Her CT brain was negative. EKG was unremarkable. She stable and able be discharged home. She was told that this could be side effect of her Covid vaccine. She is told to follow-up with her [facility] care physician. Return for worsening symptoms or concerns  Medications   0.9 % sodium chloride bolus (0 mLs IntraVENous Stopped 10/8/21 1554)   ondansetron (ZOFRAN) injection 4 mg (4 mg IntraVENous Given 10/8/21 1512)       FINAL IMPRESSION      1.  Lightheadedness          DISPOSITION/PLAN   DISPOSITION Decision To Discharge 10/08/2021 03:48:56 PM      PATIENT REFERRED TO:   Arlene Gutiérrez PA-C  [address]   [phone]    Schedule an appointment as soon as possible for a visit       [facility] ED  [address]   [phone]    If symptoms worsen      DISCHARGE MEDICATIONS:     Discharge Medication List as of 10/8/2021  3:49 PM              (Please note that portions of this note were completed with a voice recognition program.  Efforts were made to edit the dictations but occasionally words are mis-transcribed.)    VAHE Roca NP, APRN - CNP  Certified Nurse Practitioner        Vanessa Lara, SANDY - CNP  10/08/21 [phone]

## 2021-10-08 NOTE — ED TRIAGE NOTES
Pt to ED from work for c/o dizziness and not feeling right. Pt states she was at work when she began to feel dizzy. Pt reports coworkers told her she was pale so she went on break. Pt states she has felt foggy since then. Pt states her BP was elevated at the time 150's over 90's. Pt states she recently had a procedure on her uterus for endometrial biopsies. Pt states she also just received her first COVID vaccine on Tuesday. Pt is alert and oriented X4. Pt in NAD. Awaiting physician assessment and further orders.

## 2021-10-11 DIAGNOSIS — R87.619 ATYPICAL GLANDULAR CELLS OF UNDETERMINED SIGNIFICANCE (AGUS) ON CERVICAL PAP SMEAR: ICD-10-CM

## 2021-10-11 LAB
EKG ATRIAL RATE: 84 BPM
EKG P AXIS: 29 DEGREES
EKG P-R INTERVAL: 134 MS
EKG Q-T INTERVAL: 386 MS
EKG QRS DURATION: 92 MS
EKG QTC CALCULATION (BAZETT): 456 MS
EKG R AXIS: 36 DEGREES
EKG T AXIS: 64 DEGREES
EKG VENTRICULAR RATE: 84 BPM

## 2021-10-11 PROCEDURE — 58100 BIOPSY OF UTERUS LINING: CPT | Performed by: OBSTETRICS & GYNECOLOGY

## 2021-10-15 ENCOUNTER — OFFICE VISIT (OUTPATIENT)
Dept: PRIMARY CARE CLINIC | Age: 31
End: 2021-10-15
Payer: COMMERCIAL

## 2021-10-15 VITALS
OXYGEN SATURATION: 98 % | BODY MASS INDEX: 42.66 KG/M2 | WEIGHT: 240.8 LBS | HEART RATE: 81 BPM | SYSTOLIC BLOOD PRESSURE: 122 MMHG | DIASTOLIC BLOOD PRESSURE: 74 MMHG | HEIGHT: 63 IN

## 2021-10-15 DIAGNOSIS — F41.8 DEPRESSION WITH ANXIETY: ICD-10-CM

## 2021-10-15 DIAGNOSIS — E78.2 MIXED HYPERLIPIDEMIA: ICD-10-CM

## 2021-10-15 DIAGNOSIS — L73.9 FOLLICULITIS: Primary | ICD-10-CM

## 2021-10-15 PROBLEM — E88.81 INSULIN RESISTANCE: Status: RESOLVED | Noted: 2020-02-19 | Resolved: 2021-10-15

## 2021-10-15 PROBLEM — E88.819 INSULIN RESISTANCE: Status: RESOLVED | Noted: 2020-02-19 | Resolved: 2021-10-15

## 2021-10-15 PROCEDURE — 99214 OFFICE O/P EST MOD 30 MIN: CPT | Performed by: PHYSICIAN ASSISTANT

## 2021-10-15 RX ORDER — MIRTAZAPINE 7.5 MG/1
7.5 TABLET, FILM COATED ORAL NIGHTLY
Qty: 30 TABLET | Refills: 0 | Status: SHIPPED | OUTPATIENT
Start: 2021-10-15 | End: 2021-11-22

## 2021-10-15 RX ORDER — DOXYCYCLINE HYCLATE 100 MG
100 TABLET ORAL 2 TIMES DAILY
Qty: 28 TABLET | Refills: 0 | Status: SHIPPED | OUTPATIENT
Start: 2021-10-15 | End: 2021-10-29

## 2021-10-15 ASSESSMENT — ENCOUNTER SYMPTOMS
VOMITING: 0
BACK PAIN: 0
ABDOMINAL PAIN: 1
NAUSEA: 0
SHORTNESS OF BREATH: 0

## 2021-10-15 NOTE — PROGRESS NOTES
Laterality Date    ADENOIDECTOMY      BRONCHOSCOPY      CHOLECYSTECTOMY  2-1-2013    COLONOSCOPY      SHOULDER SURGERY Left     bone spur    TONSILLECTOMY      TYMPANOSTOMY TUBE PLACEMENT      UPPER GASTROINTESTINAL ENDOSCOPY N/A 6/8/2020    EGD BIOPSY performed by Jesus Garcia MD at [facility]       Family History   Problem Relation Age of Onset    Depression Mother    [facility] Other Mother         pancreatic issues.     High Blood Pressure Mother     High Cholesterol Mother     No Known Problems Father     Other Maternal Grandmother         vascular disease    High Blood Pressure Maternal Grandmother     High Cholesterol Maternal Grandmother     Diabetes Maternal Grandfather     High Cholesterol Maternal Grandfather     No Known Problems Paternal Grandmother     No Known Problems Paternal Grandfather     Other Sister         melonoma    Depression Sister     Anxiety Disorder Sister     Cervical Cancer Maternal Aunt 20       Social History     Tobacco Use    Smoking status: Never Smoker    Smokeless tobacco: Never Used   Substance Use Topics    Alcohol use: Not Currently      Current Outpatient Medications   Medication Sig Dispense Refill    mirtazapine (REMERON) 7.5 MG tablet Take 1 tablet by mouth nightly 30 tablet 0    doxycycline hyclate (VIBRA-TABS) 100 MG tablet Take 1 tablet by mouth 2 times daily for 14 days 28 tablet 0    famotidine (PEPCID) 40 MG tablet TAKE ONE TABLET BY MOUTH DAILY 90 tablet 0    norethindrone (ORTHO MICRONOR) 0.35 MG tablet Take 1 tablet by mouth daily for 28 days 28 tablet 12    pantoprazole (PROTONIX) 40 MG tablet Take 1 tablet by mouth daily 90 tablet 0    montelukast (SINGULAIR) 10 MG tablet TAKE ONE TABLET BY MOUTH ONCE NIGHTLY 90 tablet 3    CINNAMON PO Take by mouth      Folic Acid (FOLATE PO) Take by mouth      naproxen (NAPROSYN) 375 MG tablet Take 1 tablet by mouth 2 times daily (with meals) 60 tablet 0    EPINEPHrine (EPIPEN) 0.3 MG/0.3ML SOAJ injection INJECT INTO THE MIDDLE OF THE OUTER THIGH AND HOLD FOR 3 SECONDS AS NEEDED FOR SEVERE ALLERGIC REACTION THEN CALL 911 IF USED. 2 each 1    cyclobenzaprine (FLEXERIL) 10 MG tablet       atenolol (TENORMIN) 25 MG tablet Take 1 tablet by mouth daily 30 tablet 5    albuterol sulfate HFA (VENTOLIN HFA) 108 (90 Base) MCG/ACT inhaler Inhale 2 puffs into the lungs 4 times daily as needed for Wheezing 1 Inhaler 5    promethazine (PHENERGAN) 12.5 MG tablet Take 1 tablet by mouth every 6 hours as needed for Nausea 30 tablet 1    baclofen (LIORESAL) 10 MG tablet Take 1 tablet by mouth 3 times daily 90 tablet 2    diphenhydrAMINE (BENADRYL ALLERGY) 25 MG tablet Take 50 mg by mouth every 6 hours as needed for Itching       Cetirizine HCl 10 MG CAPS Take by mouth      Cholecalciferol (VITAMIN D3 PO) Take by mouth daily      hydrocortisone (ANUSOL-HC) 2.5 % CREA rectal cream Apply to rectum bid x 3 days then PRN for flare of hemorrhoids (Patient not taking: Reported on 10/15/2021) 1 Tube 1     No current facility-administered medications for this visit.      Allergies   Allergen Reactions    Latex Rash    Avocado Anaphylaxis    Banana Anaphylaxis    Amlodipine Swelling    Menthol (Topical Analgesic) [Menthol (Topical Analgesic)] Other (See Comments)     Burning and rash    Sudafed [Pseudoephedrine Hcl]     Augmentin [Amoxicillin-Pot Clavulanate] Nausea And Vomiting    Tape Ricke Guard Tape] Rash       Health Maintenance   Topic Date Due    Hepatitis C screen  Never done    HIV screen  Never done    DTaP/Tdap/Td vaccine (1 - Tdap) Never done    Varicella vaccine (2 of 2 - 13+ 2-dose series) 08/24/2011    Flu vaccine (1) 10/15/2022 (Originally 9/1/2021)    COVID-19 Vaccine (2 - Moderna 2-dose series) 11/02/2021    Potassium monitoring  10/08/2022    Creatinine monitoring  10/08/2022    Pap smear  08/16/2024    Hepatitis B vaccine  Completed    Hepatitis A vaccine  Aged Out    Hib vaccine  Aged Out    Meningococcal (ACWY) vaccine  Aged Out    Pneumococcal 0-64 years Vaccine  Aged Out       Subjective:      Review of Systems   Constitutional: Negative for activity change, appetite change, chills, diaphoresis, fatigue and fever. Respiratory: Negative for shortness of breath. Cardiovascular: Negative for chest pain. Gastrointestinal: Positive for abdominal pain. Negative for nausea and vomiting. Endocrine: Positive for heat intolerance (at night). Negative for cold intolerance. Musculoskeletal: Negative for arthralgias, back pain, myalgias and neck pain. Neurological: Negative for dizziness, seizures, syncope, weakness, light-headedness and headaches. Psychiatric/Behavioral: Positive for dysphoric mood. Negative for agitation, behavioral problems, confusion, decreased concentration, hallucinations, self-injury, sleep disturbance and suicidal ideas. The patient is not nervous/anxious and is not hyperactive. Objective:     /74   Pulse 81   Ht 5' 3\" (1.6 m)   Wt 240 lb 12.8 oz (109.2 kg)   SpO2 98%   BMI 42.66 kg/m²   Physical Exam  Vitals and nursing note reviewed. Constitutional:       Appearance: Normal appearance. Comments: Lost 3 pounds    Cardiovascular:      Rate and Rhythm: Normal rate and regular rhythm. Heart sounds: Normal heart sounds. Pulmonary:      Effort: Pulmonary effort is normal.      Breath sounds: Normal breath sounds. Neurological:      Mental Status: She is alert and oriented to person, place, and time. Assessment:       Diagnosis Orders   1. Folliculitis     2. Mixed hyperlipidemia  Lipid Panel    ALT    AST   3. Depression with anxiety          Plan:    Changed Wellbutrin to Mirtazipine--use nightly  Doxycycline for foliiculitis  Take probiotic with the ABX  Wash with antibacterial soap and make sure to moisturize so you don't get too dry. Keep working on [employer] in 3 months.     Return in about 6 weeks (around 11/26/2021) for recheck on mirtazipine, rash and get Fluvax and Boostrix. Orders Placed This Encounter   Procedures    Lipid Panel     Standing Status:   Future     Standing Expiration Date:   10/15/2022     Order Specific Question:   Is Patient Fasting?/# of Hours     Answer:   10-12 hours    ALT     Standing Status:   Future     Standing Expiration Date:   10/15/2022    AST     Standing Status:   Future     Standing Expiration Date:   10/15/2022     Orders Placed This Encounter   Medications    mirtazapine (REMERON) 7.5 MG tablet     Sig: Take 1 tablet by mouth nightly     Dispense:  30 tablet     Refill:  0    doxycycline hyclate (VIBRA-TABS) 100 MG tablet     Sig: Take 1 tablet by mouth 2 times daily for 14 days     Dispense:  28 tablet     Refill:  0       Patient given educational materials - see patient instructions. Discussed use, benefit, and side effects of prescribed medications. All patient questions answered. Pt voiced understanding. Reviewed health maintenance. Instructed to continue current medications, diet and exercise. Patient agreed with treatment plan. Follow up as directed.      Electronically signed by Arlene Gutiérrez PA-C on 10/15/2021 at 8:35 AM

## 2021-10-21 ENCOUNTER — OFFICE VISIT (OUTPATIENT)
Dept: OBGYN CLINIC | Age: 31
End: 2021-10-21
Payer: COMMERCIAL

## 2021-10-21 VITALS
WEIGHT: 240 LBS | SYSTOLIC BLOOD PRESSURE: 116 MMHG | BODY MASS INDEX: 42.52 KG/M2 | HEIGHT: 63 IN | DIASTOLIC BLOOD PRESSURE: 64 MMHG

## 2021-10-21 DIAGNOSIS — N91.1 SECONDARY AMENORRHEA: Primary | ICD-10-CM

## 2021-10-21 PROCEDURE — 99213 OFFICE O/P EST LOW 20 MIN: CPT | Performed by: OBSTETRICS & GYNECOLOGY

## 2021-10-21 RX ORDER — MEDROXYPROGESTERONE ACETATE 10 MG/1
10 TABLET ORAL DAILY
Qty: 7 TABLET | Refills: 0 | Status: SHIPPED | OUTPATIENT
Start: 2021-10-21 | End: 2021-12-30

## 2021-10-21 NOTE — PROGRESS NOTES
OB/GYN Problem Visit    Vaibhav Peters  10/21/2021                       Primary Care Physician: Cass Awan PA-C    CC:   Chief Complaint   Patient presents with    Follow-up     results         HPI: Vaibhav Peters is a 36 y.o. female  No LMP recorded. (Menstrual status: Irregular periods). The patient was seen and examined. She is here for follow up from EMB. EMB was negative. Also reviewed with patient secondary amenorrhea work up so far:   [address] normal  E2 normal  TSH normal   Prolactin normal   Testosterone normal   17 OHP normal   TVUS simple cyst, otherwise normal     Patient has been on OCPs for 2 months and states she only had one episode of vaginal bleeding which was following the EMB. Patient has not done a progesterone withdrawal bleed. She is very anxious to get an answer to why she is not menstruating as she plans to try to conceive next year.        REVIEW OF SYSTEMS:  Constitutional: negative fever, negative chills  HEENT: negative visual disturbances, negative headaches  Respiratory: negative dyspnea, negative cough  Cardiovascular: negative chest pain,  negative palpitations  Gastrointestinal: negative abdominal pain, negative RUQ pain, negative N/V, negative diarrhea, negative constipation  Genitourinary: negative dysuria, negative vaginal discharge  Dermatological: negative rash  Hematologic: negative bruising  Immunologic/Lymphatic: negative recent illness, negative recent sick contact  Musculoskeletal: negative back pain, negative myalgias, negative arthralgias  Neurological:  negative dizziness, negative weakness  Behavior/Psych: negative depression, negative anxiety    OBSTETRICAL HISTORY:  OB History    Para Term  AB Living   0 0 0 0 0 0   SAB TAB Ectopic Molar Multiple Live Births   0 0 0 0 0 0       PAST MEDICAL HISTORY:      Diagnosis Date    Asthma     Chronic bilateral low back pain without sciatica 2021    Class 3 severe obesity with serious comorbidity in adult [facility]) 7/13/2021    Depression with anxiety 6/8/2021    Gastroparesis     GERD (gastroesophageal reflux disease)     Headache     Hepatic adenoma     has referral to [facility] Hyperlipidemia     Hypertension     Insulin resistance     Irritable bowel syndrome     Scoliosis     has an S curve     Seasonal affective disorder ([facility] [address] 3/2/2021    Wears glasses        PAST SURGICAL HISTORY:                                                                    Procedure Laterality Date    ADENOIDECTOMY      BRONCHOSCOPY      CHOLECYSTECTOMY  2-1-2013    COLONOSCOPY      SHOULDER SURGERY Left     bone spur    TONSILLECTOMY      TYMPANOSTOMY TUBE PLACEMENT      UPPER GASTROINTESTINAL ENDOSCOPY N/A 6/8/2020    EGD BIOPSY performed by Lazarus Sayers, MD at [facility] Endoscopy       MEDICATIONS:  Current Outpatient Medications   Medication Sig Dispense Refill    medroxyPROGESTERone (PROVERA) 10 MG tablet Take 1 tablet by mouth daily for 7 days 7 tablet 0    mirtazapine (REMERON) 7.5 MG tablet Take 1 tablet by mouth nightly 30 tablet 0    doxycycline hyclate (VIBRA-TABS) 100 MG tablet Take 1 tablet by mouth 2 times daily for 14 days 28 tablet 0    famotidine (PEPCID) 40 MG tablet TAKE ONE TABLET BY MOUTH DAILY 90 tablet 0    norethindrone (ORTHO MICRONOR) 0.35 MG tablet Take 1 tablet by mouth daily for 28 days 28 tablet 12    pantoprazole (PROTONIX) 40 MG tablet Take 1 tablet by mouth daily 90 tablet 0    montelukast (SINGULAIR) 10 MG tablet TAKE ONE TABLET BY MOUTH ONCE NIGHTLY 90 tablet 3    CINNAMON PO Take by mouth      Folic Acid (FOLATE PO) Take by mouth      naproxen (NAPROSYN) 375 MG tablet Take 1 tablet by mouth 2 times daily (with meals) 60 tablet 0    EPINEPHrine (EPIPEN) 0.3 MG/0.3ML SOAJ injection INJECT INTO THE MIDDLE OF THE OUTER THIGH AND HOLD FOR 3 SECONDS AS NEEDED FOR SEVERE ALLERGIC REACTION THEN CALL 911 IF USED.  2 each 1    cyclobenzaprine (FLEXERIL) 10 MG tablet       atenolol (TENORMIN) 25 MG tablet Take 1 tablet by mouth daily 30 tablet 5    albuterol sulfate HFA (VENTOLIN HFA) 108 (90 Base) MCG/ACT inhaler Inhale 2 puffs into the lungs 4 times daily as needed for Wheezing 1 Inhaler 5    promethazine (PHENERGAN) 12.5 MG tablet Take 1 tablet by mouth every 6 hours as needed for Nausea 30 tablet 1    baclofen (LIORESAL) 10 MG tablet Take 1 tablet by mouth 3 times daily 90 tablet 2    diphenhydrAMINE (BENADRYL ALLERGY) 25 MG tablet Take 50 mg by mouth every 6 hours as needed for Itching       hydrocortisone (ANUSOL-HC) 2.5 % CREA rectal cream Apply to rectum bid x 3 days then PRN for flare of hemorrhoids (Patient not taking: Reported on 10/15/2021) 1 Tube 1    Cetirizine HCl 10 MG CAPS Take by mouth      Cholecalciferol (VITAMIN D3 PO) Take by mouth daily       No current facility-administered medications for this visit. ALLERGIES:   Allergies as of 10/21/2021 - Fully Reviewed 10/15/2021   Allergen Reaction Noted    Latex Rash 01/16/2013    Avocado Anaphylaxis 01/06/2020    Banana Anaphylaxis 01/06/2020    Amlodipine Swelling 11/17/2020    Menthol (topical analgesic) [menthol (topical analgesic)] Other (See Comments) 01/16/2013    Sudafed [pseudoephedrine hcl]  01/06/2020    Augmentin [amoxicillin-pot clavulanate] Nausea And Vomiting 05/18/2020    Tape [adhesive tape] Rash 01/16/2013                                   VITALS:  Vitals:    10/21/21 1147   BP: 116/64   Site: Right Upper Arm   Position: Sitting   Cuff Size: Large Adult   Weight: 240 lb (108.9 kg)   Height: 5' 3\" (1.6 m)                                                                                                                                                                         PHYSICAL EXAM:   General Appearance: Appears healthy. Alert; in no acute distress. Pleasant. Skin: Skin color, texture, turgor normal. No rashes or lesions.   HEENT: normocephalic and atraumatic, Thyroid normal to inspection and palpation  Abdomen: soft, non-tender, non-distended, no right upper quadrant tenderness and no CVA tenderness, no abdominal scars  Musculoskeletal: no gross abnormalities  Extremities: non-tender BLE and non-edematous  Psych:  oriented to time, place and person     ASSESSMENT & PLAN:    Arsen Rinaldi is a 36 y.o. female  No LMP recorded. (Menstrual status: Irregular periods). with Secondary Amenorrhea    - Work up negative so far   - Will do progesterone withdrawal    - If patient does not have bleeding following progesterone withdrawal we will plan on proceeding with hysteroscopy. - Patient to call after progesterone challenge. Patient Active Problem List    Diagnosis Date Noted    Class 3 severe obesity with serious comorbidity in adult [facility]) 2021    Depression with anxiety 2021    Lipoma of right lower extremity 2021    Chronic bilateral low back pain without sciatica 2021    Numbness of foot 2021    Gastric polyps 2021    Seasonal affective disorder ([facility] [address] 2021    Gastroesophageal reflux disease     Dyspepsia     Irritable bowel syndrome with diarrhea 2020    Fluid level behind tympanic membrane of both ears 2020    Essential hypertension 2020    Mixed hyperlipidemia 2020    Reactive airway disease without complication     Idiopathic hypersomnolence 2020       No follow-ups on file. Patient was seen with total face to face time of 15 minutes. More than 50% of this visit was on counseling and education regarding her diagnose(s) as listed below and options. She was also counseled on her preventative health maintenance recommendations and follow-up. Diagnosis Orders   1.  Secondary amenorrhea  Estradiol       Vaishnavi Aus, DO  Ob/Gyn   [facility], [address]  10/21/2021, 4:17 PM

## 2021-11-26 ENCOUNTER — TELEPHONE (OUTPATIENT)
Dept: PRIMARY CARE CLINIC | Age: 31
End: 2021-11-26

## 2021-12-02 NOTE — TELEPHONE ENCOUNTER
Labs ordered-  Testosterone  17-hydroxyprogesterone  SHBG  2 hour GTT    She has had-  Prolactin, TSH and lipids already    US ordered
Patient states she was seen 07/23/2020 and you discussed ordering labs for her. Patient did not specify what kind of labs were discussed. Patient states she would like those labs ordered at this time. Patient would like a call when labs have been ordered states it is okay to leave vmail she is headed to work.
none

## 2021-12-06 RX ORDER — PANTOPRAZOLE SODIUM 40 MG/1
TABLET, DELAYED RELEASE ORAL
Qty: 90 TABLET | Refills: 0 | Status: SHIPPED | OUTPATIENT
Start: 2021-12-06 | End: 2021-12-10

## 2021-12-06 RX ORDER — FAMOTIDINE 40 MG/1
TABLET, FILM COATED ORAL
Qty: 90 TABLET | Refills: 0 | Status: SHIPPED | OUTPATIENT
Start: 2021-12-06 | End: 2022-03-10

## 2021-12-08 ENCOUNTER — TELEPHONE (OUTPATIENT)
Dept: PRIMARY CARE CLINIC | Age: 31
End: 2021-12-08

## 2021-12-08 RX ORDER — MIRTAZAPINE 7.5 MG/1
TABLET, FILM COATED ORAL
Qty: 30 TABLET | Refills: 1 | Status: SHIPPED | OUTPATIENT
Start: 2021-12-08 | End: 2021-12-30 | Stop reason: ALTCHOICE

## 2021-12-08 NOTE — TELEPHONE ENCOUNTER
She is upset her appt has been rescheduled twice. I tried to explain to her that it was a scheduling error. She said the next one better not get rescheduled or she will be \"livid\".

## 2021-12-10 RX ORDER — PANTOPRAZOLE SODIUM 40 MG/1
TABLET, DELAYED RELEASE ORAL
Qty: 90 TABLET | Refills: 0 | Status: SHIPPED | OUTPATIENT
Start: 2021-12-10 | End: 2022-05-12

## 2021-12-28 RX ORDER — ATENOLOL 25 MG/1
TABLET ORAL
Qty: 90 TABLET | Refills: 0 | Status: SHIPPED | OUTPATIENT
Start: 2021-12-28 | End: 2022-05-12

## 2021-12-30 ENCOUNTER — OFFICE VISIT (OUTPATIENT)
Dept: PRIMARY CARE CLINIC | Age: 31
End: 2021-12-30
Payer: COMMERCIAL

## 2021-12-30 VITALS
WEIGHT: 242.8 LBS | OXYGEN SATURATION: 98 % | SYSTOLIC BLOOD PRESSURE: 138 MMHG | DIASTOLIC BLOOD PRESSURE: 84 MMHG | HEIGHT: 63 IN | HEART RATE: 88 BPM | BODY MASS INDEX: 43.02 KG/M2

## 2021-12-30 DIAGNOSIS — L73.9 FOLLICULITIS: Primary | ICD-10-CM

## 2021-12-30 DIAGNOSIS — F33.8 SEASONAL AFFECTIVE DISORDER (HCC): ICD-10-CM

## 2021-12-30 DIAGNOSIS — F33.0 MILD EPISODE OF RECURRENT MAJOR DEPRESSIVE DISORDER (HCC): ICD-10-CM

## 2021-12-30 DIAGNOSIS — Z23 NEED FOR VACCINATION: ICD-10-CM

## 2021-12-30 PROCEDURE — 90715 TDAP VACCINE 7 YRS/> IM: CPT | Performed by: PHYSICIAN ASSISTANT

## 2021-12-30 PROCEDURE — 99214 OFFICE O/P EST MOD 30 MIN: CPT | Performed by: PHYSICIAN ASSISTANT

## 2021-12-30 PROCEDURE — 90471 IMMUNIZATION ADMIN: CPT | Performed by: PHYSICIAN ASSISTANT

## 2021-12-30 RX ORDER — ACETAMINOPHEN AND CODEINE PHOSPHATE 120; 12 MG/5ML; MG/5ML
1 SOLUTION ORAL DAILY
COMMUNITY
End: 2021-12-30 | Stop reason: SDUPTHER

## 2021-12-30 SDOH — ECONOMIC STABILITY: FOOD INSECURITY: WITHIN THE PAST 12 MONTHS, THE FOOD YOU BOUGHT JUST DIDN'T LAST AND YOU DIDN'T HAVE MONEY TO GET MORE.: NEVER TRUE

## 2021-12-30 SDOH — ECONOMIC STABILITY: FOOD INSECURITY: WITHIN THE PAST 12 MONTHS, YOU WORRIED THAT YOUR FOOD WOULD RUN OUT BEFORE YOU GOT MONEY TO BUY MORE.: NEVER TRUE

## 2021-12-30 ASSESSMENT — ENCOUNTER SYMPTOMS: BACK PAIN: 1

## 2021-12-30 ASSESSMENT — SOCIAL DETERMINANTS OF HEALTH (SDOH): HOW HARD IS IT FOR YOU TO PAY FOR THE VERY BASICS LIKE FOOD, HOUSING, MEDICAL CARE, AND HEATING?: NOT HARD AT ALL

## 2021-12-30 NOTE — PROGRESS NOTES
[facility]  [address] [facility] [address]  Dept: [address] Horner is a 36 y.o. female who presents today for her medical conditions/complaints as noted below. Chief Complaint   Patient presents with    Medication Check     patient is here today for med f/u - remeron. Pt states she was seen at [facility] ED 12/24 patient states she thought she may have had a kidney stone but results came back negative. patient states she would also like to follow up on \"bumps on back\"       HPI:     HPI  Bumps on back: NOT CLEARED UP. Doxycycline x 2weeks: might have helped. Mostly serous or bloody discharge. No itch. Washes with antibacterial soap. Right lower back:  PT helped. Started 1 week ago. ER gave her Toradol and muscle relaxer. Remeron: weight gain and making her too tired. Trying to get pregnant soon.    LDL Cholesterol (mg/dL)   Date Value   06/07/2021 190 (H)   05/15/2020 138 (H)   01/07/2020 162 (H)       (goal LDL is <100)   AST (U/L)   Date Value   04/26/2021 18     ALT (U/L)   Date Value   04/26/2021 22     BUN (mg/dL)   Date Value   10/08/2021 14     BP Readings from Last 3 Encounters:   12/30/21 138/84   10/21/21 116/64   10/15/21 122/74          (goal 120/80)    Past Medical History:   Diagnosis Date    Asthma     Chronic bilateral low back pain without sciatica 4/26/2021    Class 3 severe obesity with serious comorbidity in adult [facility]) 7/13/2021    Depression with anxiety 6/8/2021    Gastroparesis     GERD (gastroesophageal reflux disease)     Headache     Hepatic adenoma     has referral to [facility] Hyperlipidemia     Hypertension     Insulin resistance     Irritable bowel syndrome     Scoliosis     has an S curve     Seasonal affective disorder ([address] 3/2/2021    Wears glasses       Past Surgical History:   Procedure Laterality Date    ADENOIDECTOMY      BRONCHOSCOPY      CHOLECYSTECTOMY 02/01/2013    COLONOSCOPY      COLPOSCOPY  10/04/2021    SHOULDER SURGERY Left     bone spur    TONSILLECTOMY      TYMPANOSTOMY TUBE PLACEMENT      UPPER GASTROINTESTINAL ENDOSCOPY N/A 06/08/2020    EGD BIOPSY performed by Laverne Poe MD at [facility] Endoscopy       Family History   Problem Relation Age of Onset    Depression Mother    Torey Rice Other Mother         pancreatic issues.  High Blood Pressure Mother     High Cholesterol Mother     No Known Problems Father     Other Maternal Grandmother         vascular disease    High Blood Pressure Maternal Grandmother     High Cholesterol Maternal Grandmother     Diabetes Maternal Grandfather     High Cholesterol Maternal Grandfather     No Known Problems Paternal Grandmother     No Known Problems Paternal Grandfather     Other Sister         melonoma    Depression Sister     Anxiety Disorder Sister     Cervical Cancer Maternal Aunt 20       Social History     Tobacco Use    Smoking status: Never Smoker    Smokeless tobacco: Never Used   Substance Use Topics    Alcohol use: Not Currently      Current Outpatient Medications   Medication Sig Dispense Refill    atenolol (TENORMIN) 25 MG tablet TAKE ONE TABLET BY MOUTH DAILY 90 tablet 0    pantoprazole (PROTONIX) 40 MG tablet TAKE ONE TABLET BY MOUTH DAILY 90 tablet 0    famotidine (PEPCID) 40 MG tablet TAKE ONE TABLET BY MOUTH DAILY 90 tablet 0    norethindrone (ORTHO MICRONOR) 0.35 MG tablet Take 1 tablet by mouth daily for 28 days 28 tablet 12    montelukast (SINGULAIR) 10 MG tablet TAKE ONE TABLET BY MOUTH ONCE NIGHTLY 90 tablet 3    CINNAMON PO Take by mouth      naproxen (NAPROSYN) 375 MG tablet Take 1 tablet by mouth 2 times daily (with meals) 60 tablet 0    EPINEPHrine (EPIPEN) 0.3 MG/0.3ML SOAJ injection INJECT INTO THE MIDDLE OF THE OUTER THIGH AND HOLD FOR 3 SECONDS AS NEEDED FOR SEVERE ALLERGIC REACTION THEN CALL 911 IF USED.  2 each 1    cyclobenzaprine (FLEXERIL) 10 MG tablet       albuterol sulfate HFA (VENTOLIN HFA) 108 (90 Base) MCG/ACT inhaler Inhale 2 puffs into the lungs 4 times daily as needed for Wheezing 1 Inhaler 5    promethazine (PHENERGAN) 12.5 MG tablet Take 1 tablet by mouth every 6 hours as needed for Nausea 30 tablet 1    baclofen (LIORESAL) 10 MG tablet Take 1 tablet by mouth 3 times daily 90 tablet 2    diphenhydrAMINE (BENADRYL ALLERGY) 25 MG tablet Take 50 mg by mouth every 6 hours as needed for Itching       Cetirizine HCl 10 MG CAPS Take by mouth      Cholecalciferol (VITAMIN D3 PO) Take by mouth daily       No current facility-administered medications for this visit. Allergies   Allergen Reactions    Latex Rash    Avocado Anaphylaxis    Banana Anaphylaxis    Amlodipine Swelling    Menthol (Topical Analgesic) [Menthol (Topical Analgesic)] Other (See Comments)     Burning and rash    Sudafed [Pseudoephedrine Hcl]     Augmentin [Amoxicillin-Pot Clavulanate] Nausea And Vomiting    Tape Evaristo Boss Tape] Rash       Health Maintenance   Topic Date Due    Hepatitis C screen  Never done    HIV screen  Never done    Varicella vaccine (2 of 2 - 13+ 2-dose series) 08/24/2011    Flu vaccine (1) 10/15/2022 (Originally 9/1/2021)    COVID-19 Vaccine (3 - Booster for Moderna series) 05/02/2022    Potassium monitoring  10/08/2022    Creatinine monitoring  10/08/2022    Pap smear  08/16/2024    DTaP/Tdap/Td vaccine (2 - Td or Tdap) 12/30/2031    Hepatitis B vaccine  Completed    Hepatitis A vaccine  Aged Out    Hib vaccine  Aged Out    Meningococcal (ACWY) vaccine  Aged Out    Pneumococcal 0-64 years Vaccine  Aged Out       Subjective:      Review of Systems   Musculoskeletal: Positive for back pain (right lower back--SI region). Skin: Positive for rash. Psychiatric/Behavioral: Positive for dysphoric mood and sleep disturbance. All other systems reviewed and are negative.       Objective:     /84   Pulse 88   Ht 5' 3\" (1.6 m)   Wt 242 lb 12.8 oz (110.1 kg)   SpO2 98%   BMI 43.01 kg/m²   Physical Exam  Vitals and nursing note reviewed. Constitutional:       Appearance: Normal appearance. She is obese. Cardiovascular:      Rate and Rhythm: Normal rate and regular rhythm. Heart sounds: Normal heart sounds. Pulmonary:      Effort: Pulmonary effort is normal.      Breath sounds: Normal breath sounds. Skin:     Findings: Rash present. Rash is macular. Comments: Excoriated papules   Neurological:      Mental Status: She is alert and oriented to person, place, and time. Psychiatric:         Mood and Affect: Mood normal.         Behavior: Behavior normal.         Thought Content: Thought content normal.         Judgment: Judgment normal.         Assessment:       Diagnosis Orders   1. Rosmery Arteaga MD, Dermatology, [facility]   2. Seasonal affective disorder ([address]  Cristino Evans MD, Psychiatry, [place]   3. Mild episode of recurrent major depressive disorder (HCC)  Cristino Evans MD, Psychiatry, [place]   4. Need for vaccination  Tdap (age 6y and older) IM (Boostrix)        Plan:    Exercises and heat for back pain  Referrals given---Pysch: need help with medication for depression that won't cause weight gain but safe for pregnancy  See derm for folliculitis  Booxtrix today         Return in about 6 months (around 6/30/2022) for recheck---1/2 hour.     Orders Placed This Encounter   Procedures    Tdap (age 6y and older) IM (Boostrix)   Muriel Valdivia MD, Dermatology, [facility]     Referral Priority:   Routine     Referral Type:   Eval and Treat     Referral Reason:   Specialty Services Required     Referred to Provider:   Orquidea Norman MD     Requested Specialty:   Dermatology     Number of Visits Requested:   Dieudonne Raman MD, Psychiatry, [facility]     Referral Priority:   Routine     Referral Type:   Eval and Treat     Referral Reason:   Specialty Services Required     Referred to Provider:   Katie Rodriguez MD     Requested Specialty:   Psychiatry     Number of Visits Requested:   1     No orders of the defined types were placed in this encounter. Patient given educational materials - see patient instructions. Discussed use, benefit, and side effects of prescribed medications. All patient questions answered. Pt voiced understanding. Reviewed health maintenance. Instructed to continue current medications, diet and exercise. Patient agreed with treatment plan. Follow up as directed.      Electronically signed by Allison Mancini PA-C on 12/30/2021 at 2:49 PM

## 2022-02-08 ENCOUNTER — OFFICE VISIT (OUTPATIENT)
Dept: PRIMARY CARE CLINIC | Age: 32
End: 2022-02-08
Payer: COMMERCIAL

## 2022-02-08 VITALS
SYSTOLIC BLOOD PRESSURE: 120 MMHG | DIASTOLIC BLOOD PRESSURE: 82 MMHG | OXYGEN SATURATION: 98 % | HEART RATE: 77 BPM | BODY MASS INDEX: 42.7 KG/M2 | WEIGHT: 241 LBS | HEIGHT: 63 IN | TEMPERATURE: 97.4 F

## 2022-02-08 DIAGNOSIS — R52 BODY ACHES: ICD-10-CM

## 2022-02-08 DIAGNOSIS — H65.93 MIDDLE EAR EFFUSION, BILATERAL: ICD-10-CM

## 2022-02-08 DIAGNOSIS — H93.8X2 MASS OF LEFT EAR: Primary | ICD-10-CM

## 2022-02-08 DIAGNOSIS — R50.9 FEVER, UNSPECIFIED FEVER CAUSE: ICD-10-CM

## 2022-02-08 LAB
INFLUENZA A ANTIBODY: NEGATIVE
INFLUENZA B ANTIBODY: NEGATIVE

## 2022-02-08 PROCEDURE — 99213 OFFICE O/P EST LOW 20 MIN: CPT | Performed by: PHYSICIAN ASSISTANT

## 2022-02-08 PROCEDURE — 87804 INFLUENZA ASSAY W/OPTIC: CPT | Performed by: PHYSICIAN ASSISTANT

## 2022-02-08 RX ORDER — FLUTICASONE PROPIONATE 50 MCG
2 SPRAY, SUSPENSION (ML) NASAL DAILY
Qty: 48 G | Refills: 1 | Status: SHIPPED | OUTPATIENT
Start: 2022-02-08

## 2022-02-08 RX ORDER — CEFDINIR 300 MG/1
CAPSULE ORAL
COMMUNITY
Start: 2022-02-06 | End: 2022-06-27

## 2022-02-08 ASSESSMENT — ENCOUNTER SYMPTOMS
NAUSEA: 1
COUGH: 0
SHORTNESS OF BREATH: 0

## 2022-02-08 NOTE — PROGRESS NOTES
[facility]  [address] [place] [id number]  Phone: [phone]  Fax: [phone]    Jarred Brown is a 29 y.o. female who presents today for her medical conditions/complaintsas noted below. Chief Complaint   Patient presents with   [place] Conception     Patient is here today for swelling behind left ear. patient states she was seen at urgent care in [facility] for this 2/6/22 patient states she was told that she had lymph node, patient was given antibiotic for this, patient states not getting better    Headache    Chills    Fever    Dizziness         HPI:     HPI  On ABX (Cefdinir) for an infection behind left ear. Was on vacation in [place]. Started here before vacation. Last night started with feeling hot and cold and getting achy    Current Outpatient Medications   Medication Sig Dispense Refill    cefdinir (OMNICEF) 300 MG capsule       fluticasone (FLONASE) 50 MCG/ACT nasal spray 2 sprays by Each Nostril route daily 48 g 1    atenolol (TENORMIN) 25 MG tablet TAKE ONE TABLET BY MOUTH DAILY 90 tablet 0    pantoprazole (PROTONIX) 40 MG tablet TAKE ONE TABLET BY MOUTH DAILY 90 tablet 0    famotidine (PEPCID) 40 MG tablet TAKE ONE TABLET BY MOUTH DAILY 90 tablet 0    montelukast (SINGULAIR) 10 MG tablet TAKE ONE TABLET BY MOUTH ONCE NIGHTLY 90 tablet 3    CINNAMON PO Take by mouth      naproxen (NAPROSYN) 375 MG tablet Take 1 tablet by mouth 2 times daily (with meals) 60 tablet 0    EPINEPHrine (EPIPEN) 0.3 MG/0.3ML SOAJ injection INJECT INTO THE MIDDLE OF THE OUTER THIGH AND HOLD FOR 3 SECONDS AS NEEDED FOR SEVERE ALLERGIC REACTION THEN CALL 911 IF USED.  2 each 1    cyclobenzaprine (FLEXERIL) 10 MG tablet       albuterol sulfate HFA (VENTOLIN HFA) 108 (90 Base) MCG/ACT inhaler Inhale 2 puffs into the lungs 4 times daily as needed for Wheezing 1 Inhaler 5    promethazine (PHENERGAN) 12.5 MG tablet Take 1 tablet by mouth every 6 hours as needed for Nausea 30 tablet 1    baclofen (LIORESAL) 10 MG tablet Take 1 tablet by mouth 3 times daily 90 tablet 2    diphenhydrAMINE (BENADRYL ALLERGY) 25 MG tablet Take 50 mg by mouth every 6 hours as needed for Itching       Cetirizine HCl 10 MG CAPS Take by mouth      Cholecalciferol (VITAMIN D3 PO) Take by mouth daily      norethindrone (ORTHO MICRONOR) 0.35 MG tablet Take 1 tablet by mouth daily for 28 days 28 tablet 12     No current facility-administered medications for this visit. Allergies   Allergen Reactions    Latex Rash    Avocado Anaphylaxis    Banana Anaphylaxis    Amlodipine Swelling    Menthol (Topical Analgesic) [Menthol (Topical Analgesic)] Other (See Comments)     Burning and rash    Sudafed [Pseudoephedrine Hcl]     Augmentin [Amoxicillin-Pot Clavulanate] Nausea And Vomiting    Tape Foster [employer]me Tape] Rash       Subjective:      Review of Systems   Constitutional: Positive for chills, diaphoresis (last night) and fever (low grade this morning). HENT: Positive for ear pain. Respiratory: Negative for cough and shortness of breath. Cardiovascular: Negative for chest pain. Gastrointestinal: Positive for nausea. Musculoskeletal: Positive for arthralgias and myalgias. Mostly on left side upper body   Neurological: Positive for dizziness and headaches. Objective:     /82   Pulse 77   Temp 97.4 °F (36.3 °C) (Temporal)   Ht 5' 3\" (1.6 m)   Wt 241 lb (109.3 kg)   SpO2 98%   BMI 42.69 kg/m²   Physical Exam  Vitals and nursing note reviewed. Constitutional:       Appearance: Normal appearance. HENT:      Right Ear: No drainage. A middle ear effusion is present. Tympanic membrane is not injected. Left Ear: Swelling (ear lobe) present. No drainage. A middle ear effusion is present. Tympanic membrane is not injected.       Ears:      Comments: Appx 7mm shiny slightly vascular looking mass behind left ear  Cardiovascular:      Rate and Rhythm: Normal rate and setting? Answer:   No     Order Specific Question:   Pregnant? Answer:   No     Order Specific Question:   Previously tested for COVID-19? Answer:    Yes    POCT Influenza A/B     Orders Placed This Encounter   Medications    fluticasone (FLONASE) 50 MCG/ACT nasal spray     Si sprays by Each Nostril route daily     Dispense:  48 g     Refill:  1           Electronically signed by Kt Benites 2022 at 11:42 AM

## 2022-02-09 LAB
SARS-COV-2: NORMAL
SARS-COV-2: NOT DETECTED
SOURCE: NORMAL

## 2022-03-10 RX ORDER — BACLOFEN 10 MG/1
TABLET ORAL
Qty: 45 TABLET | OUTPATIENT
Start: 2022-03-10

## 2022-03-10 RX ORDER — FAMOTIDINE 40 MG/1
TABLET, FILM COATED ORAL
Qty: 90 TABLET | Refills: 1 | Status: SHIPPED | OUTPATIENT
Start: 2022-03-10 | End: 2022-04-18 | Stop reason: SDUPTHER

## 2022-03-22 ENCOUNTER — HOSPITAL ENCOUNTER (OUTPATIENT)
Age: 32
Setting detail: SPECIMEN
Discharge: HOME | End: 2022-03-22

## 2022-03-22 ENCOUNTER — OFFICE VISIT (OUTPATIENT)
Dept: PRIMARY CARE CLINIC | Age: 32
End: 2022-03-22
Payer: COMMERCIAL

## 2022-03-22 VITALS
OXYGEN SATURATION: 98 % | BODY MASS INDEX: 41.64 KG/M2 | DIASTOLIC BLOOD PRESSURE: 80 MMHG | WEIGHT: 235 LBS | SYSTOLIC BLOOD PRESSURE: 122 MMHG | TEMPERATURE: 98.1 F | HEIGHT: 63 IN | HEART RATE: 83 BPM

## 2022-03-22 DIAGNOSIS — L02.211 CUTANEOUS ABSCESS OF ABDOMINAL WALL: ICD-10-CM

## 2022-03-22 DIAGNOSIS — L02.211 CUTANEOUS ABSCESS OF ABDOMINAL WALL: Primary | ICD-10-CM

## 2022-03-22 DIAGNOSIS — L73.9 FOLLICULITIS: ICD-10-CM

## 2022-03-22 PROCEDURE — 99213 OFFICE O/P EST LOW 20 MIN: CPT | Performed by: PHYSICIAN ASSISTANT

## 2022-03-22 RX ORDER — SULFAMETHOXAZOLE AND TRIMETHOPRIM 800; 160 MG/1; MG/1
1 TABLET ORAL 2 TIMES DAILY
Qty: 20 TABLET | Refills: 0 | Status: SHIPPED | OUTPATIENT
Start: 2022-03-22 | End: 2022-04-01

## 2022-03-22 ASSESSMENT — ENCOUNTER SYMPTOMS
ROS SKIN COMMENTS: SPOTS ON BACK
EYES NEGATIVE: 1
ABDOMINAL PAIN: 0
COLOR CHANGE: 0
RESPIRATORY NEGATIVE: 1

## 2022-03-22 NOTE — PROGRESS NOTES
[facility] Primary Care  [address] [facility] [address]  Phone: [phone]  Fax: [phone]    Rojelio Dallas is a 29 y.o. female who presents today for her medical conditions/complaintsas noted below. Chief Complaint   Patient presents with    Wound Check     patient said she has a wound on her stomach that she noticed 1 week ago. Patient said since she noticed it it seems like it opened up more. HPI:     HPI  Wound on abdomen: Starte as a red spot and did get bigger and more red Has been there a week Draining blood. Current Outpatient Medications   Medication Sig Dispense Refill    sertraline (ZOLOFT) 50 MG tablet       sulfamethoxazole-trimethoprim (BACTRIM DS;SEPTRA DS) 800-160 MG per tablet Take 1 tablet by mouth 2 times daily for 10 days 20 tablet 0    mupirocin (BACTROBAN) 2 % ointment Apply topically 3 times daily. 22 g 1    famotidine (PEPCID) 40 MG tablet TAKE ONE TABLET BY MOUTH DAILY 90 tablet 1    fluticasone (FLONASE) 50 MCG/ACT nasal spray 2 sprays by Each Nostril route daily 48 g 1    atenolol (TENORMIN) 25 MG tablet TAKE ONE TABLET BY MOUTH DAILY 90 tablet 0    pantoprazole (PROTONIX) 40 MG tablet TAKE ONE TABLET BY MOUTH DAILY 90 tablet 0    norethindrone (ORTHO MICRONOR) 0.35 MG tablet Take 1 tablet by mouth daily for 28 days 28 tablet 12    montelukast (SINGULAIR) 10 MG tablet TAKE ONE TABLET BY MOUTH ONCE NIGHTLY 90 tablet 3    naproxen (NAPROSYN) 375 MG tablet Take 1 tablet by mouth 2 times daily (with meals) 60 tablet 0    EPINEPHrine (EPIPEN) 0.3 MG/0.3ML SOAJ injection INJECT INTO THE MIDDLE OF THE OUTER THIGH AND HOLD FOR 3 SECONDS AS NEEDED FOR SEVERE ALLERGIC REACTION THEN CALL 911 IF USED.  2 each 1    promethazine (PHENERGAN) 12.5 MG tablet Take 1 tablet by mouth every 6 hours as needed for Nausea 30 tablet 1    baclofen (LIORESAL) 10 MG tablet Take 1 tablet by mouth 3 times daily 90 tablet 2    Cetirizine HCl 10 MG CAPS Take by mouth      Cholecalciferol (VITAMIN D3 PO) Take by mouth daily      cefdinir (OMNICEF) 300 MG capsule  (Patient not taking: Reported on 3/22/2022)      CINNAMON PO Take by mouth (Patient not taking: Reported on 3/22/2022)      cyclobenzaprine (FLEXERIL) 10 MG tablet  (Patient not taking: Reported on 3/22/2022)      albuterol sulfate HFA (VENTOLIN HFA) 108 (90 Base) MCG/ACT inhaler Inhale 2 puffs into the lungs 4 times daily as needed for Wheezing 1 Inhaler 5    diphenhydrAMINE (BENADRYL ALLERGY) 25 MG tablet Take 50 mg by mouth every 6 hours as needed for Itching        No current facility-administered medications for this visit. Allergies   Allergen Reactions    Latex Rash    Avocado Anaphylaxis    Banana Anaphylaxis    Amlodipine Swelling    Menthol (Topical Analgesic) [Menthol (Topical Analgesic)] Other (See Comments)     Burning and rash    Sudafed [Pseudoephedrine Hcl]     Augmentin [Amoxicillin-Pot Clavulanate] Nausea And Vomiting    Tape Marcell De Jesus Tape] Rash       Subjective:      Review of Systems   Constitutional: Positive for diaphoresis. Negative for chills, fever and unexpected weight change. HENT: Negative. Negative for mouth sores. \"ears are full\"    Eyes: Negative. Respiratory: Negative. Cardiovascular: Negative. Gastrointestinal: Negative for abdominal pain. Musculoskeletal: Negative for arthralgias and myalgias. Skin: Positive for wound. Negative for color change, pallor and rash. Spots on back    Allergic/Immunologic: Negative for environmental allergies, food allergies and immunocompromised state. Hematological: Negative for adenopathy. Objective:     /80   Pulse 83   Temp 98.1 °F (36.7 °C)   Ht 5' 3\" (1.6 m)   Wt 235 lb (106.6 kg)   SpO2 98%   BMI 41.63 kg/m²   Physical Exam  Vitals and nursing note reviewed. Constitutional:       Appearance: Normal appearance. She is not ill-appearing.    Cardiovascular:      Rate and Rhythm: Normal rate and regular rhythm. Heart sounds: Normal heart sounds. Pulmonary:      Effort: Pulmonary effort is normal.      Breath sounds: Normal breath sounds. Abdominal:          Comments: Small opening with serous/bloody fluid, non fluculant and no cyst body appreciated. Back with multiple healing follicular lesions and one active ance lesion under bra     One inflammed papule on right inner thigh   Neurological:      Mental Status: She is alert and oriented to person, place, and time. Assessment:       Diagnosis Orders   1. Cutaneous abscess of abdominal wall  Culture, Wound   2. Folliculitis          Plan:    culture taken  BActrim started  WArm compresses  Cleanse with HibaClens daily  Mupirocin ointment bid. No follow-ups on file. Orders Placed This Encounter   Procedures    Culture, Wound     Standing Status:   Future     Standing Expiration Date:   3/22/2023     Orders Placed This Encounter   Medications    sulfamethoxazole-trimethoprim (BACTRIM DS;SEPTRA DS) 800-160 MG per tablet     Sig: Take 1 tablet by mouth 2 times daily for 10 days     Dispense:  20 tablet     Refill:  0    mupirocin (BACTROBAN) 2 % ointment     Sig: Apply topically 3 times daily.      Dispense:  22 g     Refill:  1           Electronically signed by Kt Vickers 3/22/2022 at 4:52 PM

## 2022-03-24 LAB
CULTURE: ABNORMAL
DIRECT EXAM: ABNORMAL
DIRECT EXAM: ABNORMAL
SPECIMEN DESCRIPTION: ABNORMAL

## 2022-04-07 RX ORDER — BACLOFEN 10 MG/1
TABLET ORAL
Qty: 45 TABLET | OUTPATIENT
Start: 2022-04-07

## 2022-04-11 RX ORDER — BACLOFEN 10 MG/1
TABLET ORAL
Qty: 45 TABLET | OUTPATIENT
Start: 2022-04-11

## 2022-04-18 RX ORDER — MONTELUKAST SODIUM 10 MG/1
TABLET ORAL
Qty: 90 TABLET | Refills: 3 | Status: SHIPPED | OUTPATIENT
Start: 2022-04-18

## 2022-04-18 RX ORDER — BACLOFEN 10 MG/1
10 TABLET ORAL 3 TIMES DAILY
Qty: 270 TABLET | Refills: 0 | Status: SHIPPED | OUTPATIENT
Start: 2022-04-18

## 2022-04-18 RX ORDER — FAMOTIDINE 40 MG/1
TABLET, FILM COATED ORAL
Qty: 90 TABLET | Refills: 0 | Status: SHIPPED | OUTPATIENT
Start: 2022-04-18 | End: 2022-05-12 | Stop reason: SDUPTHER

## 2022-04-19 RX ORDER — BACLOFEN 10 MG/1
TABLET ORAL
Qty: 45 TABLET | OUTPATIENT
Start: 2022-04-19

## 2022-04-21 DIAGNOSIS — Z91.018 ALLERGY TO FOOD: ICD-10-CM

## 2022-04-22 RX ORDER — EPINEPHRINE 0.3 MG/.3ML
INJECTION SUBCUTANEOUS
Qty: 2 EACH | Refills: 1 | Status: SHIPPED | OUTPATIENT
Start: 2022-04-22

## 2022-04-22 RX ORDER — ALBUTEROL SULFATE 90 UG/1
AEROSOL, METERED RESPIRATORY (INHALATION)
Qty: 18 G | Refills: 3 | Status: SHIPPED | OUTPATIENT
Start: 2022-04-22

## 2022-05-12 RX ORDER — FAMOTIDINE 40 MG/1
TABLET, FILM COATED ORAL
Qty: 90 TABLET | Refills: 3 | Status: SHIPPED | OUTPATIENT
Start: 2022-05-12

## 2022-05-12 RX ORDER — ATENOLOL 25 MG/1
TABLET ORAL
Qty: 90 TABLET | Refills: 3 | Status: SHIPPED | OUTPATIENT
Start: 2022-05-12

## 2022-05-12 RX ORDER — PANTOPRAZOLE SODIUM 40 MG/1
TABLET, DELAYED RELEASE ORAL
Qty: 90 TABLET | Refills: 3 | Status: SHIPPED | OUTPATIENT
Start: 2022-05-12

## 2022-05-12 NOTE — TELEPHONE ENCOUNTER
LAST VISIT:   3/22/2022     Future Appointments   Date Time Provider Department Center   6/27/2022  9:30 AM Bernadette Roman, [facility] Phelps

## 2022-05-12 NOTE — TELEPHONE ENCOUNTER
LAST VISIT:   3/22/2022     Future Appointments   Date Time Provider Department Center   6/27/2022  9:30 AM [facility] Burns

## 2022-06-08 ENCOUNTER — TELEPHONE (OUTPATIENT)
Dept: PRIMARY CARE CLINIC | Age: 32
End: 2022-06-08

## 2022-06-08 NOTE — TELEPHONE ENCOUNTER
Patient called in stating her liver specialist wants her to have the Hep A & Hep B vaccine. Patient states they have to be separate vaccines - she does not want the combo vaccine. Patient reports having an allergy to Latex and states she cannot have a vaccine that is prefilled due to the latex in the the of the vaccine - patient reports she has never had an anaphylaxis reaction to latex before but does not want to the risk. .. Patient is going to call the health department to see if they can get vaccine in a vial vs the prefilled. Any advice?

## 2022-06-08 NOTE — TELEPHONE ENCOUNTER
I know they are prefilled syringes but know nothing about the laytex properties of them. Can you please help since you are the .

## 2022-06-09 NOTE — TELEPHONE ENCOUNTER
Patient is calling to say that she has been feeling sad and having suicidal thoughts.  She was in [facility] from 3/17- 3/21/22.  She is still feeling sad and having suicidal thoughts. Patient was transferred to intake.   We do have prefilled syringes - the insert states there is natural rubber latex in the cap. Patient is concerned about a reaction from the latex in the cap.

## 2022-06-27 ENCOUNTER — OFFICE VISIT (OUTPATIENT)
Dept: PRIMARY CARE CLINIC | Age: 32
End: 2022-06-27
Payer: COMMERCIAL

## 2022-06-27 VITALS
WEIGHT: 236.8 LBS | DIASTOLIC BLOOD PRESSURE: 88 MMHG | BODY MASS INDEX: 41.96 KG/M2 | HEART RATE: 90 BPM | HEIGHT: 63 IN | SYSTOLIC BLOOD PRESSURE: 138 MMHG | OXYGEN SATURATION: 99 %

## 2022-06-27 DIAGNOSIS — L73.9 FOLLICULITIS: ICD-10-CM

## 2022-06-27 DIAGNOSIS — E78.2 MIXED HYPERLIPIDEMIA: ICD-10-CM

## 2022-06-27 DIAGNOSIS — I10 ESSENTIAL HYPERTENSION: Primary | ICD-10-CM

## 2022-06-27 LAB
ALT SERPL-CCNC: 20 U/L (ref 5–33)
AST SERPL-CCNC: 15 U/L
CHOLESTEROL/HDL RATIO: 6.4
CHOLESTEROL: 236 MG/DL
HDLC SERPL-MCNC: 37 MG/DL
LDL CHOLESTEROL: 172 MG/DL (ref 0–130)
TOTAL CK: 62 U/L (ref 26–192)
TRIGL SERPL-MCNC: 133 MG/DL

## 2022-06-27 PROCEDURE — 99213 OFFICE O/P EST LOW 20 MIN: CPT | Performed by: PHYSICIAN ASSISTANT

## 2022-06-27 RX ORDER — TRAZODONE HYDROCHLORIDE 50 MG/1
TABLET ORAL
COMMUNITY
Start: 2022-05-10

## 2022-06-27 ASSESSMENT — ENCOUNTER SYMPTOMS
ABDOMINAL PAIN: 0
COUGH: 0
SHORTNESS OF BREATH: 0
COLOR CHANGE: 0
CHEST TIGHTNESS: 0
APNEA: 0

## 2022-06-27 ASSESSMENT — PATIENT HEALTH QUESTIONNAIRE - PHQ9
SUM OF ALL RESPONSES TO PHQ QUESTIONS 1-9: 0
2. FEELING DOWN, DEPRESSED OR HOPELESS: 0
4. FEELING TIRED OR HAVING LITTLE ENERGY: 0
3. TROUBLE FALLING OR STAYING ASLEEP: 0
SUM OF ALL RESPONSES TO PHQ QUESTIONS 1-9: 0
SUM OF ALL RESPONSES TO PHQ9 QUESTIONS 1 & 2: 0
9. THOUGHTS THAT YOU WOULD BE BETTER OFF DEAD, OR OF HURTING YOURSELF: 0
5. POOR APPETITE OR OVEREATING: 0
SUM OF ALL RESPONSES TO PHQ QUESTIONS 1-9: 0
6. FEELING BAD ABOUT YOURSELF - OR THAT YOU ARE A FAILURE OR HAVE LET YOURSELF OR YOUR FAMILY DOWN: 0
8. MOVING OR SPEAKING SO SLOWLY THAT OTHER PEOPLE COULD HAVE NOTICED. OR THE OPPOSITE, BEING SO FIGETY OR RESTLESS THAT YOU HAVE BEEN MOVING AROUND A LOT MORE THAN USUAL: 0
SUM OF ALL RESPONSES TO PHQ QUESTIONS 1-9: 0
7. TROUBLE CONCENTRATING ON THINGS, SUCH AS READING THE NEWSPAPER OR WATCHING TELEVISION: 0
10. IF YOU CHECKED OFF ANY PROBLEMS, HOW DIFFICULT HAVE THESE PROBLEMS MADE IT FOR YOU TO DO YOUR WORK, TAKE CARE OF THINGS AT HOME, OR GET ALONG WITH OTHER PEOPLE: 0
1. LITTLE INTEREST OR PLEASURE IN DOING THINGS: 0

## 2022-06-27 NOTE — PROGRESS NOTES
[address] [place] [address] PRIMARY CARE  [address] [facility] [address]  Dept: [phone]    Dago Dumas is a 29 y.o. female who presents today for her medical conditions/complaints as noted below. Chief Complaint   Patient presents with    Hypertension     Follow up        HPI:     HPI  Endo at [address]: Checked her liver adenomas and her insulin levels     Saw psychiatry and started Zoloft but patient  stopped it and now seeing therapist. Also sent her for ADHD testing.        LDL Cholesterol (mg/dL)   Date Value   06/07/2021 190 (H)   05/15/2020 138 (H)   01/07/2020 162 (H)       (goal LDL is <100)   AST (U/L)   Date Value   04/26/2021 18     ALT (U/L)   Date Value   04/26/2021 22     BUN (mg/dL)   Date Value   10/08/2021 14     BP Readings from Last 3 Encounters:   06/27/22 138/88   03/22/22 122/80   02/08/22 120/82          (goal 120/80)    Past Medical History:   Diagnosis Date    Asthma     Chronic bilateral low back pain without sciatica 4/26/2021    Class 3 severe obesity with serious comorbidity in adult [facility]) 7/13/2021    Depression with anxiety 6/8/2021    Gastroparesis     GERD (gastroesophageal reflux disease)     Headache     Hepatic adenoma     has referral to [facility] Hyperlipidemia     Hypertension     Insulin resistance     Irritable bowel syndrome     Scoliosis     has an S curve     Seasonal affective disorder ([address] 3/2/2021    Wears glasses       Past Surgical History:   Procedure Laterality Date    ADENOIDECTOMY      BRONCHOSCOPY      CHOLECYSTECTOMY  02/01/2013    COLONOSCOPY      COLPOSCOPY  10/04/2021    SHOULDER SURGERY Left     bone spur    TONSILLECTOMY      TYMPANOSTOMY TUBE PLACEMENT      UPPER GASTROINTESTINAL ENDOSCOPY N/A 06/08/2020    EGD BIOPSY performed by Amarjit Cardenas MD at [facility]       Family History   Problem Relation Age of Onset    Depression Mother    [facility] Other Mother         pancreatic issues.  High Blood Pressure Mother     High Cholesterol Mother     No Known Problems Father     Other Maternal Grandmother         vascular disease    High Blood Pressure Maternal Grandmother     High Cholesterol Maternal Grandmother     Diabetes Maternal Grandfather     High Cholesterol Maternal Grandfather     No Known Problems Paternal Grandmother     No Known Problems Paternal Grandfather     Other Sister         melonoma    Depression Sister     Anxiety Disorder Sister     Cervical Cancer Maternal Aunt 20       Social History     Tobacco Use    Smoking status: Never Smoker    Smokeless tobacco: Never Used   Substance Use Topics    Alcohol use: Not Currently      Current Outpatient Medications   Medication Sig Dispense Refill    traZODone (DESYREL) 50 MG tablet       atenolol (TENORMIN) 25 MG tablet TAKE ONE TABLET BY MOUTH DAILY 90 tablet 3    pantoprazole (PROTONIX) 40 MG tablet TAKE ONE TABLET BY MOUTH DAILY 90 tablet 3    famotidine (PEPCID) 40 MG tablet TAKE ONE TABLET BY MOUTH DAILY 90 tablet 3    albuterol sulfate  (90 Base) MCG/ACT inhaler INHALE TWO PUFFS INTO THE LUNGS FOUR TIMES A DAY AS NEEDED FOR WHEEZING 18 g 3    EPINEPHrine (EPIPEN) 0.3 MG/0.3ML SOAJ injection INJECT INTO THE MIDDLE OF THE OUTER THIGH AND HOLD FOR THREE SECONDS AS NEEDED FOR SEVERE ALLERGIC REACTION THEN CALL 911 IF USED 2 each 1    baclofen (LIORESAL) 10 MG tablet Take 1 tablet by mouth 3 times daily 270 tablet 0    montelukast (SINGULAIR) 10 MG tablet TAKE ONE TABLET BY MOUTH ONCE NIGHTLY 90 tablet 3    mupirocin (BACTROBAN) 2 % ointment Apply topically 3 times daily.  22 g 1    fluticasone (FLONASE) 50 MCG/ACT nasal spray 2 sprays by Each Nostril route daily 48 g 1    naproxen (NAPROSYN) 375 MG tablet Take 1 tablet by mouth 2 times daily (with meals) 60 tablet 0    promethazine (PHENERGAN) 12.5 MG tablet Take 1 tablet by mouth every 6 hours as needed for Nausea 30 tablet 1    diphenhydrAMINE (BENADRYL ALLERGY) 25 MG tablet Take 50 mg by mouth every 6 hours as needed for Itching       Cetirizine HCl 10 MG CAPS Take by mouth      Cholecalciferol (VITAMIN D3 PO) Take by mouth daily       No current facility-administered medications for this visit. Allergies   Allergen Reactions    Latex Rash    Avocado Anaphylaxis    Banana Anaphylaxis    Amlodipine Swelling    Menthol (Topical Analgesic) [Menthol (Topical Analgesic)] Other (See Comments)     Burning and rash    Sudafed [Pseudoephedrine Hcl]     Augmentin [Amoxicillin-Pot Clavulanate] Nausea And Vomiting    Tape Corrinne Gens Tape] Rash       Health Maintenance   Topic Date Due    HIV screen  Never done    Varicella vaccine (2 of 2 - 13+ 2-dose series) 08/24/2011    Depression Monitoring  03/02/2022    COVID-19 Vaccine (3 - Booster for Moderna series) 04/02/2022    Flu vaccine (Season Ended) 10/15/2022 (Originally 9/1/2022)    Cervical cancer screen  08/16/2024    DTaP/Tdap/Td vaccine (2 - Td or Tdap) 12/30/2031    Hepatitis B vaccine  Completed    Hepatitis C screen  Completed    Hepatitis A vaccine  Aged Out    Hib vaccine  Aged Out    Meningococcal (ACWY) vaccine  Aged Out    Pneumococcal 0-64 years Vaccine  Aged Out       Subjective:      Review of Systems   Constitutional: Negative for fatigue. Respiratory: Negative for apnea, cough, chest tightness and shortness of breath. Cardiovascular: Negative for chest pain, palpitations and leg swelling. Gastrointestinal: Negative for abdominal pain. Skin: Negative for color change. Neurological: Negative for dizziness, syncope, weakness, light-headedness and headaches. Psychiatric/Behavioral: Negative for sleep disturbance. The patient is not nervous/anxious. Objective:     /88   Pulse 90   Ht 5' 3\" (1.6 m)   Wt 236 lb 12.8 oz (107.4 kg)   SpO2 99%   BMI 41.95 kg/m²   Physical Exam  Vitals reviewed. Neck:      Vascular: No carotid bruit. Cardiovascular:      Rate and Rhythm: Normal rate and regular rhythm. Heart sounds: Normal heart sounds. No murmur heard. No friction rub. No gallop. Pulmonary:      Effort: Pulmonary effort is normal.      Breath sounds: Normal breath sounds. Musculoskeletal:      Cervical back: No edema. Neurological:      Mental Status: She is alert and oriented to person, place, and time. Assessment:       Diagnosis Orders   1. Essential hypertension     2. Roxanne Friend MD, Dermatology, [place]   3. Mixed hyperlipidemia  Lipid Panel    ALT    AST    CK        Plan:    Needs to have her lipids rechecked and will check a CK now and if she get any muscle aches if we start a statin  Referral for a different derm give today   Work on [employer] and regular exercise  Continue Metoprolol. Return in about 6 months (around 12/27/2022) for HTN. Orders Placed This Encounter   Procedures    Lipid Panel     Standing Status:   Future     Standing Expiration Date:   6/27/2023     Order Specific Question:   Is Patient Fasting?/# of Hours     Answer:   10-12 hours    ALT     Standing Status:   Future     Standing Expiration Date:   6/27/2023    AST     Standing Status:   Future     Standing Expiration Date:   6/27/2023    CK     Standing Status:   Future     Standing Expiration Date:   6/27/2023    BLAIRE Medina MD, Dermatology, [place]     Referral Priority:   Routine     Referral Type:   Eval and Treat     Referral Reason:   Specialty Services Required     Referred to Provider:   Zeynep Kimbrough MD     Requested Specialty:   Dermatology     Number of Visits Requested:   1     No orders of the defined types were placed in this encounter. Patient given educational materials - see patient instructions. Discussed use, benefit, and side effects of prescribed medications. All patient questions answered. Pt voiced understanding. Reviewed health maintenance.   Instructed to

## 2022-07-14 ENCOUNTER — TELEPHONE (OUTPATIENT)
Dept: OBGYN CLINIC | Age: 32
End: 2022-07-14

## 2022-07-14 NOTE — LETTER
[facility]   [address] [place] [id number]  Phone: [phone]  Fax: [phone]    7/14/2022    Dear Mindy Campo records indicate that you are due for Annual Exam/Pap  Last pap 8/16/21        Follow up and preventative care are very important to your health. Please make an appointment today for the above care. If you have chosen to receive care else where, please let us know.     Thank you,         Dr. Aldo Phillips

## 2022-07-14 NOTE — TELEPHONE ENCOUNTER
Mailed letter requesting pt call office to schedule annual appt/hx abnormal pap   Last pap 8/16/21 ASCUS  colpo 10/4/21

## 2022-07-17 ENCOUNTER — TELEPHONE (OUTPATIENT)
Dept: PRIMARY CARE CLINIC | Age: 32
End: 2022-07-17

## 2022-07-17 DIAGNOSIS — J98.8 RESPIRATORY INFECTION: Primary | ICD-10-CM

## 2022-07-17 DIAGNOSIS — R06.02 SOB (SHORTNESS OF BREATH): ICD-10-CM

## 2022-07-17 RX ORDER — AZITHROMYCIN 250 MG/1
250 TABLET, FILM COATED ORAL SEE ADMIN INSTRUCTIONS
Qty: 6 TABLET | Refills: 0 | Status: SHIPPED | OUTPATIENT
Start: 2022-07-17 | End: 2022-07-22

## 2022-07-17 NOTE — TELEPHONE ENCOUNTER
Patient has had Covid-19 symptoms since Thursday, 07/07/22. Patient tested positive for Covid on 07/10/2022. She went to urgent care and was prescribed 5 days of steroids, tessalon pearls, and albuterol treatments. She states symptoms are worse. She took shower this morning and is SOB. She is unable to work. Discussed at this point sin 10 days since start of symptoms oral antivitals medications unalikely to help. Will send in Z-pack due to possible bacterial secondary infections and CXR ordered if symptoms do not improve. Her oxygen saturations have been maintiained in the 90's. If SOB gets worse she should be seen.

## 2022-08-27 NOTE — PROGRESS NOTES
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 female. Nulligravida         John Moreira PA-C      Cheryl Deutsch returns today for her annual exam.  She has a history of secondary anovulatory amenorrhea, JOEL and had a D&C which was negative. She states that she is still only having menses approximately 2 times throughout the year. She was on Provera in the past with positive withdrawal bleeds. Currently she is not interested in childbearing but will be next year. She is having regular bowel movements without constipation or diarrhea. She denies any UTI symptoms or involuntary loss of urine.   She is otherwise without any significant complaints today.  _____________________________________________________________________  Past Medical History:   Diagnosis Date    Asthma     Chronic bilateral low back pain without sciatica 2021    Class 3 severe obesity with serious comorbidity in adult [facility]) 2021    Depression with anxiety 2021    Gastroparesis     GERD (gastroesophageal reflux disease)     Headache     Hepatic adenoma     has referral to [facility]    Hyperlipidemia     Hypertension     Insulin resistance     Irritable bowel syndrome     Scoliosis     has an S curve     Seasonal affective disorder ([facility] [address] 3/2/2021    Wears glasses                                                                    Past Surgical History:   Procedure Laterality Date    ADENOIDECTOMY      BRONCHOSCOPY      CHOLECYSTECTOMY  2013    COLONOSCOPY      COLPOSCOPY  10/04/2021    SHOULDER SURGERY Left     bone spur    TONSILLECTOMY      TYMPANOSTOMY TUBE PLACEMENT      UPPER GASTROINTESTINAL ENDOSCOPY N/A 2020    EGD BIOPSY performed by Simi Montana MD at [facility]     Family History   Problem Relation Age of Onset    Depression Mother     Other Mother         pancreatic issues. High Blood Pressure Mother     High Cholesterol Mother     No Known Problems Father     Other Maternal Grandmother         vascular disease    High Blood Pressure Maternal Grandmother     High Cholesterol Maternal Grandmother     Diabetes Maternal Grandfather     High Cholesterol Maternal Grandfather     No Known Problems Paternal Grandmother     No Known Problems Paternal Grandfather     Other Sister         melonoma    Depression Sister     Anxiety Disorder Sister     Cervical Cancer Maternal Aunt 21     Social History     Tobacco Use   Smoking Status Never   Smokeless Tobacco Never     Social History     Substance and Sexual Activity   Alcohol Use Not Currently     Current Outpatient Medications   Medication Sig Dispense Refill    traZODone (DESYREL) 50 MG tablet       atenolol (TENORMIN) 25 MG tablet TAKE ONE TABLET BY MOUTH DAILY 90 tablet 3    pantoprazole (PROTONIX) 40 MG tablet TAKE ONE TABLET BY MOUTH DAILY 90 tablet 3    famotidine (PEPCID) 40 MG tablet TAKE ONE TABLET BY MOUTH DAILY 90 tablet 3    albuterol sulfate  (90 Base) MCG/ACT inhaler INHALE TWO PUFFS INTO THE LUNGS FOUR TIMES A DAY AS NEEDED FOR WHEEZING 18 g 3    EPINEPHrine (EPIPEN) 0.3 MG/0.3ML SOAJ injection INJECT INTO THE MIDDLE OF THE OUTER THIGH AND HOLD FOR THREE SECONDS AS NEEDED FOR SEVERE ALLERGIC REACTION THEN CALL 911 IF USED 2 each 1    baclofen (LIORESAL) 10 MG tablet Take 1 tablet by mouth 3 times daily 270 tablet 0    montelukast (SINGULAIR) 10 MG tablet TAKE ONE TABLET BY MOUTH ONCE NIGHTLY 90 tablet 3    mupirocin (BACTROBAN) 2 % ointment Apply topically 3 times daily.  22 g 1    fluticasone (FLONASE) 50 MCG/ACT nasal spray 2 sprays by Each Nostril route daily 48 g 1    naproxen (NAPROSYN) 375 MG tablet Take 1 tablet by mouth 2 times daily (with meals) 60 tablet 0 promethazine (PHENERGAN) 12.5 MG tablet Take 1 tablet by mouth every 6 hours as needed for Nausea 30 tablet 1    diphenhydrAMINE (BENADRYL ALLERGY) 25 MG tablet Take 50 mg by mouth every 6 hours as needed for Itching       Cetirizine HCl 10 MG CAPS Take by mouth      Cholecalciferol (VITAMIN D3 PO) Take by mouth daily       No current facility-administered medications for this visit. Allergies: Allergies   Allergen Reactions    Latex Rash    Avocado Anaphylaxis    Banana Anaphylaxis    Amlodipine Swelling    Menthol (Topical Analgesic) [Menthol (Topical Analgesic)] Other (See Comments)     Burning and rash    Sudafed [Pseudoephedrine Hcl]     Augmentin [Amoxicillin-Pot Clavulanate] Nausea And Vomiting    Tape [Adhesive Tape] Rash       Gynecologic History:  No LMP recorded. (Menstrual status: Irregular periods).   Sexually Active: Yes  STD History: No  Abnormal Pap History no  Birth Control: No    OB History    Para Term  AB Living   0 0 0 0 0 0   SAB IAB Ectopic Molar Multiple Live Births   0 0 0 0 0 0     ______________________________________________________________________  REVIEW OF SYSTEMS:        Constitutional:  Unexpected weight change no  Neurological:  Frequent headaches  no  Ophthalmic:  Recent visual changes no  ENT:   Difficulty swallowing  no  Breast:              Masses   no     Respiratory:  Shortness of breath  no    Cardiovascular: Chest pain   no     Gastrointestinal: Chronic diarrhea/constipation no   Urogenital:  Urinary incontinence  no                                         Heavy/irregular periods           no                                      Vaginal discharge                   no  Hematological: Bruises easy   no     Endocrine:  Nipple Discharge  no     Hot/Cold Intolerance  no   Psychological:  Mood and affect were wnl yes Physical Exam:    Vitals:    08/30/22 0901   BP: 122/72   Site: Right Upper Arm   Position: Sitting   Cuff Size: Large Adult   Weight: 241 lb (109.3 kg)   Height: 5' 3\" (1.6 m)        General Appearance:  She does not appear to be in any distress. This  is a well developed, well nourished, well groomed female. Neurological:  The patient is alert and oriented to time, place, person, and situation without any noted sensory motor deficits. Skin:  A brief inspection of the skin revealed no rashes or lesions. Neck:  The neck was supple. There is no tracheal deviation, thyromegaly or supraclavicular adenopathy appreciated. Breast:   The patients breasts were symmetrical.  Breasts are nontender and there  were no masses, discharge or pathologic skin changes. There is no supraclavicular or axillary adenopathy bilaterally. Respiratory: There was unlabored respiratory effort. Lungs clear to ascultation without wheezes, rales or rhonchi in all fields bilaterally. Cardiovascular:  Normal sinus rhythm with a regular rate and without murmur, rubs or gallops. Abdomen: The abdomen was soft and non-tender with no guarding, rebound, CVAT or rigidity. No hernias were appreciated. Bowel sounds were normally active. Pelvic exam:  No vulvar, vaginal or cervical lesions are noted. Normal vaginal discharge present, no significant cystocele, rectocele or enterocele noted. Uterus nongravid and without CMT and adnexa nontender and without abnormal masses bilaterally. Extremities:  FROM and nontender without clubbing cyanosis or edema. ASSESSMENT:    Diagnosis Orders   1. Encounter for gynecological examination without abnormal finding        2. Amenorrhea        3. Secondary oligomenorrhea                          PLAN:  Will reinitiate Provera 10 mg the first 10 days of each month. Family-planning counseling done.     Return to the office in 1 year or when necessary    Eliazar Cervantes M.D., Mph ,FACOG. [facility]  [place]

## 2022-08-27 NOTE — PATIENT INSTRUCTIONS
A prescription for Provera will be sent to your pharmacy. Please pick it up and begin taking it for the first 10 days of each month. If you have anything that amounts to spotting normal or that is a positive test and that should happen within 2 weeks after finishing the medication. You may also have bleeding before finishing the medication. Please call the office if you do not have any bleeding. We will contact you with the results of today's Pap smear. Return to the office in 1 year or as needed. Stay healthy and have a wonderful year!

## 2022-08-30 ENCOUNTER — OFFICE VISIT (OUTPATIENT)
Dept: OBGYN CLINIC | Age: 32
End: 2022-08-30
Payer: COMMERCIAL

## 2022-08-30 ENCOUNTER — HOSPITAL ENCOUNTER (OUTPATIENT)
Age: 32
Setting detail: SPECIMEN
Discharge: HOME | End: 2022-08-30

## 2022-08-30 VITALS
HEIGHT: 63 IN | DIASTOLIC BLOOD PRESSURE: 72 MMHG | SYSTOLIC BLOOD PRESSURE: 122 MMHG | WEIGHT: 241 LBS | BODY MASS INDEX: 42.7 KG/M2

## 2022-08-30 DIAGNOSIS — Z01.419 ENCOUNTER FOR GYNECOLOGICAL EXAMINATION WITHOUT ABNORMAL FINDING: Primary | ICD-10-CM

## 2022-08-30 DIAGNOSIS — N91.2 AMENORRHEA: ICD-10-CM

## 2022-08-30 DIAGNOSIS — N91.4 SECONDARY OLIGOMENORRHEA: ICD-10-CM

## 2022-08-30 PROCEDURE — 99395 PREV VISIT EST AGE 18-39: CPT | Performed by: OBSTETRICS & GYNECOLOGY

## 2022-08-30 RX ORDER — MEDROXYPROGESTERONE ACETATE 10 MG/1
10 TABLET ORAL DAILY
Qty: 30 TABLET | Refills: 3 | Status: SHIPPED | OUTPATIENT
Start: 2022-08-30

## 2022-09-01 LAB
HPV SAMPLE: NORMAL
HPV, GENOTYPE 16: NOT DETECTED
HPV, GENOTYPE 18: NOT DETECTED
HPV, HIGH RISK OTHER: NOT DETECTED
HPV, INTERPRETATION: NORMAL
SPECIMEN DESCRIPTION: NORMAL

## 2022-09-12 LAB — CYTOLOGY REPORT: NORMAL

## 2023-01-12 ENCOUNTER — OFFICE VISIT (OUTPATIENT)
Dept: PRIMARY CARE CLINIC | Age: 33
End: 2023-01-12
Payer: COMMERCIAL

## 2023-01-12 VITALS
BODY MASS INDEX: 42.91 KG/M2 | DIASTOLIC BLOOD PRESSURE: 76 MMHG | SYSTOLIC BLOOD PRESSURE: 132 MMHG | HEART RATE: 67 BPM | HEIGHT: 63 IN | WEIGHT: 242.2 LBS | OXYGEN SATURATION: 100 %

## 2023-01-12 DIAGNOSIS — I10 ESSENTIAL HYPERTENSION: Primary | ICD-10-CM

## 2023-01-12 DIAGNOSIS — I10 ESSENTIAL HYPERTENSION: ICD-10-CM

## 2023-01-12 DIAGNOSIS — H01.119 EYELID DERMATITIS, ALLERGIC/CONTACT: ICD-10-CM

## 2023-01-12 DIAGNOSIS — L30.9 DERMATITIS: ICD-10-CM

## 2023-01-12 DIAGNOSIS — R53.83 FATIGUE, UNSPECIFIED TYPE: ICD-10-CM

## 2023-01-12 PROBLEM — Z90.89 STATUS POST TONSILLECTOMY AND ADENOIDECTOMY: Status: ACTIVE | Noted: 2023-01-12

## 2023-01-12 PROBLEM — N20.0 CALCULUS OF KIDNEY: Status: ACTIVE | Noted: 2023-01-12

## 2023-01-12 PROBLEM — J30.9 ALLERGIC RHINITIS: Status: ACTIVE | Noted: 2023-01-12

## 2023-01-12 PROBLEM — G47.10 HYPERSOMNIA: Status: ACTIVE | Noted: 2023-01-12

## 2023-01-12 PROBLEM — J30.1 ALLERGIC RHINITIS DUE TO POLLEN: Status: ACTIVE | Noted: 2023-01-12

## 2023-01-12 LAB
ABSOLUTE EOS #: 0.1 K/UL (ref 0–0.44)
ABSOLUTE IMMATURE GRANULOCYTE: <0.03 K/UL (ref 0–0.3)
ABSOLUTE LYMPH #: 2.06 K/UL (ref 1.1–3.7)
ABSOLUTE MONO #: 0.65 K/UL (ref 0.1–1.2)
ANION GAP SERPL CALCULATED.3IONS-SCNC: 13 MMOL/L (ref 9–17)
BASOPHILS # BLD: 1 % (ref 0–2)
BASOPHILS ABSOLUTE: 0.05 K/UL (ref 0–0.2)
BUN BLDV-MCNC: 11 MG/DL (ref 6–20)
CALCIUM SERPL-MCNC: 9.5 MG/DL (ref 8.6–10.4)
CHLORIDE BLD-SCNC: 105 MMOL/L (ref 98–107)
CO2: 22 MMOL/L (ref 20–31)
CREAT SERPL-MCNC: 0.75 MG/DL (ref 0.5–0.9)
EOSINOPHILS RELATIVE PERCENT: 2 % (ref 1–4)
FERRITIN: 165 NG/ML (ref 13–150)
GFR SERPL CREATININE-BSD FRML MDRD: >60 ML/MIN/1.73M2
GLUCOSE FASTING: 103 MG/DL (ref 70–99)
HCT VFR BLD CALC: 42.1 % (ref 36.3–47.1)
HEMOGLOBIN: 14.3 G/DL (ref 11.9–15.1)
IMMATURE GRANULOCYTES: 0 %
IRON SATURATION: 40 % (ref 20–55)
IRON: 126 UG/DL (ref 37–145)
LYMPHOCYTES # BLD: 31 % (ref 24–43)
MCH RBC QN AUTO: 32.7 PG (ref 25.2–33.5)
MCHC RBC AUTO-ENTMCNC: 34 G/DL (ref 28.4–34.8)
MCV RBC AUTO: 96.3 FL (ref 82.6–102.9)
MONOCYTES # BLD: 10 % (ref 3–12)
NRBC AUTOMATED: 0 PER 100 WBC
PDW BLD-RTO: 12.3 % (ref 11.8–14.4)
PLATELET # BLD: 327 K/UL (ref 138–453)
PMV BLD AUTO: 10.5 FL (ref 8.1–13.5)
POTASSIUM SERPL-SCNC: 4.6 MMOL/L (ref 3.7–5.3)
RBC # BLD: 4.37 M/UL (ref 3.95–5.11)
SEG NEUTROPHILS: 56 % (ref 36–65)
SEGMENTED NEUTROPHILS ABSOLUTE COUNT: 3.86 K/UL (ref 1.5–8.1)
SODIUM BLD-SCNC: 140 MMOL/L (ref 135–144)
THYROXINE, FREE: 0.87 NG/DL (ref 0.93–1.7)
TOTAL IRON BINDING CAPACITY: 317 UG/DL (ref 250–450)
TSH SERPL DL<=0.05 MIU/L-ACNC: 1.56 UIU/ML (ref 0.3–5)
UNSATURATED IRON BINDING CAPACITY: 191 UG/DL (ref 112–347)
VITAMIN D 25-HYDROXY: 54 NG/ML
WBC # BLD: 6.7 K/UL (ref 3.5–11.3)

## 2023-01-12 PROCEDURE — 99214 OFFICE O/P EST MOD 30 MIN: CPT | Performed by: PHYSICIAN ASSISTANT

## 2023-01-12 PROCEDURE — 3075F SYST BP GE 130 - 139MM HG: CPT | Performed by: PHYSICIAN ASSISTANT

## 2023-01-12 PROCEDURE — 3078F DIAST BP <80 MM HG: CPT | Performed by: PHYSICIAN ASSISTANT

## 2023-01-12 RX ORDER — ATENOLOL 25 MG/1
TABLET ORAL
Qty: 90 TABLET | Refills: 3 | Status: SHIPPED | OUTPATIENT
Start: 2023-01-12

## 2023-01-12 RX ORDER — TACROLIMUS 1 MG/G
OINTMENT TOPICAL
Qty: 15 G | Refills: 0 | Status: SHIPPED | OUTPATIENT
Start: 2023-01-12

## 2023-01-12 RX ORDER — MONTELUKAST SODIUM 10 MG/1
TABLET ORAL
Qty: 90 TABLET | Refills: 3 | Status: SHIPPED | OUTPATIENT
Start: 2023-01-12

## 2023-01-12 RX ORDER — PANTOPRAZOLE SODIUM 40 MG/1
TABLET, DELAYED RELEASE ORAL
Qty: 90 TABLET | Refills: 3 | Status: SHIPPED | OUTPATIENT
Start: 2023-01-12

## 2023-01-12 RX ORDER — FAMOTIDINE 40 MG/1
TABLET, FILM COATED ORAL
Qty: 90 TABLET | Refills: 3 | Status: SHIPPED | OUTPATIENT
Start: 2023-01-12

## 2023-01-12 RX ORDER — NORETHINDRONE 0.35 MG/1
TABLET ORAL
COMMUNITY
Start: 2022-12-12

## 2023-01-12 RX ORDER — DESOXIMETASONE 2.5 MG/G
CREAM TOPICAL
Qty: 45 G | Refills: 0 | Status: SHIPPED | OUTPATIENT
Start: 2023-01-12

## 2023-01-12 SDOH — ECONOMIC STABILITY: FOOD INSECURITY: WITHIN THE PAST 12 MONTHS, YOU WORRIED THAT YOUR FOOD WOULD RUN OUT BEFORE YOU GOT MONEY TO BUY MORE.: NEVER TRUE

## 2023-01-12 SDOH — ECONOMIC STABILITY: FOOD INSECURITY: WITHIN THE PAST 12 MONTHS, THE FOOD YOU BOUGHT JUST DIDN'T LAST AND YOU DIDN'T HAVE MONEY TO GET MORE.: NEVER TRUE

## 2023-01-12 ASSESSMENT — PATIENT HEALTH QUESTIONNAIRE - PHQ9
SUM OF ALL RESPONSES TO PHQ9 QUESTIONS 1 & 2: 0
SUM OF ALL RESPONSES TO PHQ QUESTIONS 1-9: 8
7. TROUBLE CONCENTRATING ON THINGS, SUCH AS READING THE NEWSPAPER OR WATCHING TELEVISION: 0
2. FEELING DOWN, DEPRESSED OR HOPELESS: 0
5. POOR APPETITE OR OVEREATING: 2
3. TROUBLE FALLING OR STAYING ASLEEP: 3
10. IF YOU CHECKED OFF ANY PROBLEMS, HOW DIFFICULT HAVE THESE PROBLEMS MADE IT FOR YOU TO DO YOUR WORK, TAKE CARE OF THINGS AT HOME, OR GET ALONG WITH OTHER PEOPLE: 1
6. FEELING BAD ABOUT YOURSELF - OR THAT YOU ARE A FAILURE OR HAVE LET YOURSELF OR YOUR FAMILY DOWN: 0
4. FEELING TIRED OR HAVING LITTLE ENERGY: 3
SUM OF ALL RESPONSES TO PHQ QUESTIONS 1-9: 8
1. LITTLE INTEREST OR PLEASURE IN DOING THINGS: 0
SUM OF ALL RESPONSES TO PHQ QUESTIONS 1-9: 8
SUM OF ALL RESPONSES TO PHQ QUESTIONS 1-9: 8
8. MOVING OR SPEAKING SO SLOWLY THAT OTHER PEOPLE COULD HAVE NOTICED. OR THE OPPOSITE, BEING SO FIGETY OR RESTLESS THAT YOU HAVE BEEN MOVING AROUND A LOT MORE THAN USUAL: 0
9. THOUGHTS THAT YOU WOULD BE BETTER OFF DEAD, OR OF HURTING YOURSELF: 0

## 2023-01-12 ASSESSMENT — ENCOUNTER SYMPTOMS
SHORTNESS OF BREATH: 0
CHEST TIGHTNESS: 0
ABDOMINAL PAIN: 0
COLOR CHANGE: 0
COUGH: 0
APNEA: 0

## 2023-01-12 ASSESSMENT — SOCIAL DETERMINANTS OF HEALTH (SDOH): HOW HARD IS IT FOR YOU TO PAY FOR THE VERY BASICS LIKE FOOD, HOUSING, MEDICAL CARE, AND HEATING?: NOT HARD AT ALL

## 2023-01-12 NOTE — PROGRESS NOTES
[facility]  [address] [address] [facility] [address]  Dept: [phone]    Mis Hernandez is a 29 y.o. female who presents today for her medical conditions/complaints as noted below. Chief Complaint   Patient presents with    Hypertension     Patient does not check BP at home     Rash     Rash on leg that has been there for a month. Not spreading but not going away. Patient said rash itches. HPI:     Hypertension  Pertinent negatives include no chest pain, headaches, palpitations or shortness of breath. Rash  Associated symptoms include fatigue. Pertinent negatives include no cough or shortness of breath.      LDL Cholesterol (mg/dL)   Date Value   06/27/2022 172 (H)   06/07/2021 190 (H)   05/15/2020 138 (H)       (goal LDL is <100)   AST (U/L)   Date Value   06/27/2022 15     ALT (U/L)   Date Value   06/27/2022 20     BUN (mg/dL)   Date Value   10/08/2021 14     BP Readings from Last 3 Encounters:   01/12/23 132/76   08/30/22 122/72   06/27/22 138/88          (goal 120/80)    Past Medical History:   Diagnosis Date    Asthma     Calculus of kidney 1/12/2023    Chronic bilateral low back pain without sciatica 4/26/2021    Class 3 severe obesity with serious comorbidity in adult [facility]) 7/13/2021    Depression with anxiety 6/8/2021    Gastroparesis     GERD (gastroesophageal reflux disease)     Headache     Hepatic adenoma     has referral to [facility]    Hyperlipidemia     Hypertension     Insulin resistance     Irritable bowel syndrome     Scoliosis     has an S curve     Seasonal affective disorder ([facility] [address] 3/2/2021    Wears glasses       Past Surgical History:   Procedure Laterality Date    ADENOIDECTOMY      BRONCHOSCOPY      CHOLECYSTECTOMY  02/01/2013    COLONOSCOPY      COLPOSCOPY  10/04/2021    SHOULDER SURGERY Left     bone spur    TONSILLECTOMY      TYMPANOSTOMY TUBE PLACEMENT      UPPER GASTROINTESTINAL ENDOSCOPY N/A 06/08/2020    EGD BIOPSY performed by Shailesh Rueda MD at [facility]       Family History   Problem Relation Age of Onset    Depression Mother     Other Mother         pancreatic issues. High Blood Pressure Mother     High Cholesterol Mother     No Known Problems Father     Other Maternal Grandmother         vascular disease    High Blood Pressure Maternal Grandmother     High Cholesterol Maternal Grandmother     Diabetes Maternal Grandfather     High Cholesterol Maternal Grandfather     No Known Problems Paternal Grandmother     No Known Problems Paternal Grandfather     Other Sister         melonoma    Depression Sister     Anxiety Disorder Sister     Cervical Cancer Maternal Aunt 21       Social History     Tobacco Use    Smoking status: Never    Smokeless tobacco: Never   Substance Use Topics    Alcohol use: Not Currently      Current Outpatient Medications   Medication Sig Dispense Refill    PALOMA 0.35 MG tablet       desoximetasone (TOPICORT) 0.25 % cream Apply topically 2 times daily. 45 g 0    tacrolimus (PROTOPIC) 0.1 % ointment Apply topically 2 times daily.  15 g 0    atenolol (TENORMIN) 25 MG tablet TAKE ONE TABLET BY MOUTH DAILY 90 tablet 3    pantoprazole (PROTONIX) 40 MG tablet TAKE ONE TABLET BY MOUTH DAILY 90 tablet 3    famotidine (PEPCID) 40 MG tablet TAKE ONE TABLET BY MOUTH DAILY 90 tablet 3    montelukast (SINGULAIR) 10 MG tablet TAKE ONE TABLET BY MOUTH ONCE NIGHTLY 90 tablet 3    albuterol sulfate  (90 Base) MCG/ACT inhaler INHALE TWO PUFFS INTO THE LUNGS FOUR TIMES A DAY AS NEEDED FOR WHEEZING 18 g 3    EPINEPHrine (EPIPEN) 0.3 MG/0.3ML SOAJ injection INJECT INTO THE MIDDLE OF THE OUTER THIGH AND HOLD FOR THREE SECONDS AS NEEDED FOR SEVERE ALLERGIC REACTION THEN CALL 911 IF USED 2 each 1    promethazine (PHENERGAN) 12.5 MG tablet Take 1 tablet by mouth every 6 hours as needed for Nausea 30 tablet 1    Cetirizine HCl 10 MG CAPS Take by mouth      Cholecalciferol (VITAMIN D3 PO) Take by mouth daily      diphenhydrAMINE (BENADRYL) 25 MG tablet Take 50 mg by mouth every 6 hours as needed for Itching        No current facility-administered medications for this visit. Allergies   Allergen Reactions    Latex Rash    Avocado Anaphylaxis    Banana Anaphylaxis    Amlodipine Swelling    Menthol (Topical Analgesic) [Menthol (Topical Analgesic)] Other (See Comments)     Burning and rash    Sudafed [Pseudoephedrine Hcl]     Augmentin [Amoxicillin-Pot Clavulanate] Nausea And Vomiting    Tape [Adhesive Tape] Rash       Health Maintenance   Topic Date Due    HIV screen  Never done    Varicella vaccine (2 of 2 - 13+ 2-dose series) 08/24/2011    Flu vaccine (1) 01/12/2024 (Originally 8/1/2022)    COVID-19 Vaccine (3 - Booster for [place] series) 01/12/2024 (Originally 12/28/2021)    Depression Monitoring  06/27/2023    Cervical cancer screen  08/30/2027    DTaP/Tdap/Td vaccine (2 - Td or Tdap) 12/30/2031    Hepatitis C screen  Completed    Hepatitis A vaccine  Aged Out    Hib vaccine  Aged Out    Meningococcal (ACWY) vaccine  Aged Out    Pneumococcal 0-64 years Vaccine  Aged Out       Subjective:      Review of Systems   Constitutional:  Positive for fatigue. Respiratory:  Negative for apnea, cough, chest tightness and shortness of breath. Cardiovascular:  Negative for chest pain, palpitations and leg swelling. Gastrointestinal:  Negative for abdominal pain. Skin:  Positive for rash. Negative for color change. Neurological:  Negative for dizziness, syncope, weakness, light-headedness and headaches. Psychiatric/Behavioral:  Positive for sleep disturbance (falling asleep). Negative for dysphoric mood. The patient is not nervous/anxious. Objective:     /76   Pulse 67   Ht 5' 3\" (1.6 m)   Wt 242 lb 3.2 oz (109.9 kg)   SpO2 100%   BMI 42.90 kg/m²   Physical Exam  Vitals and nursing note reviewed. Constitutional:       Appearance: Normal appearance. Neck:      Vascular: No carotid bruit. Cardiovascular:      Rate and Rhythm: Normal rate and regular rhythm. Heart sounds: Normal heart sounds. No murmur heard. No friction rub. No gallop. Pulmonary:      Effort: Pulmonary effort is normal.      Breath sounds: Normal breath sounds. Musculoskeletal:      Cervical back: No edema. Right lower leg: No edema. Left lower leg: No edema. Neurological:      Mental Status: She is alert and oriented to person, place, and time. Psychiatric:         Mood and Affect: Mood normal.       Assessment:       Diagnosis Orders   1. Essential hypertension  Basic Metabolic Panel, Fasting      2. Dermatitis  desoximetasone (TOPICORT) 0.25 % cream      3. Eyelid dermatitis, allergic/contact  tacrolimus (PROTOPIC) 0.1 % ointment      4. Fatigue, unspecified type  Vitamin B12    CBC with Auto Differential    TSH    T4, Free    Ferritin    Iron and TIBC    Vitamin D 25 Hydroxy           Plan:    Refill sent  BW ordered  Creams given  Xeracalm or CeraVe  for legs  Trial melatnin for sleep--start at 5mg and if not working, then 10 mg  Return in about 6 months (around 7/12/2023) for HTN.     Orders Placed This Encounter   Procedures    Basic Metabolic Panel, Fasting     Standing Status:   Future     Standing Expiration Date:   1/12/2024    Vitamin B12     Standing Status:   Future     Standing Expiration Date:   1/12/2024    CBC with Auto Differential     Standing Status:   Future     Standing Expiration Date:   1/12/2024    TSH     Standing Status:   Future     Standing Expiration Date:   1/12/2024    T4, Free     Standing Status:   Future     Standing Expiration Date:   1/12/2024    Ferritin     Standing Status:   Future     Standing Expiration Date:   1/12/2024    Iron and TIBC     Standing Status:   Future     Standing Expiration Date:   1/12/2024    Vitamin D 25 Hydroxy     Standing Status:   Future     Standing Expiration Date:   1/12/2024       Orders Placed This Encounter   Medications desoximetasone (TOPICORT) 0.25 % cream     Sig: Apply topically 2 times daily. Dispense:  45 g     Refill:  0    tacrolimus (PROTOPIC) 0.1 % ointment     Sig: Apply topically 2 times daily. Dispense:  15 g     Refill:  0    atenolol (TENORMIN) 25 MG tablet     Sig: TAKE ONE TABLET BY MOUTH DAILY     Dispense:  90 tablet     Refill:  3    pantoprazole (PROTONIX) 40 MG tablet     Sig: TAKE ONE TABLET BY MOUTH DAILY     Dispense:  90 tablet     Refill:  3    famotidine (PEPCID) 40 MG tablet     Sig: TAKE ONE TABLET BY MOUTH DAILY     Dispense:  90 tablet     Refill:  3    montelukast (SINGULAIR) 10 MG tablet     Sig: TAKE ONE TABLET BY MOUTH ONCE NIGHTLY     Dispense:  90 tablet     Refill:  3         Patient given educational materials - see patient instructions. Discussed use, benefit, and side effects of prescribed medications. All patient questions answered. Pt voiced understanding. Reviewed health maintenance. Instructed to continue current medications, diet and exercise. Patient agreed with treatment plan. Follow up as directed.      Electronically signed by Suman Toledo PA-C on 1/12/2023 at 11:31 AM

## 2023-01-13 LAB — VITAMIN B-12: 290 PG/ML (ref 232–1245)

## 2023-04-25 NOTE — TELEPHONE ENCOUNTER
Results under care everywhere Z Plasty Text: The lesion was extirpated to the level of the fat with a #15 scalpel blade.  Given the location of the defect, shape of the defect and the proximity to free margins a Z-plasty was deemed most appropriate for repair.  Using a sterile surgical marker, the appropriate transposition arms of the Z-plasty were drawn incorporating the defect and placing the expected incisions within the relaxed skin tension lines where possible.    The area thus outlined was incised deep to adipose tissue with a #15 scalpel blade.  The skin margins were undermined to an appropriate distance in all directions utilizing iris scissors.  The opposing transposition arms were then transposed into place in opposite direction and anchored with interrupted buried subcutaneous sutures.

## 2023-06-27 ENCOUNTER — OFFICE VISIT (OUTPATIENT)
Dept: PRIMARY CARE CLINIC | Age: 33
End: 2023-06-27
Payer: COMMERCIAL

## 2023-06-27 VITALS
HEART RATE: 80 BPM | SYSTOLIC BLOOD PRESSURE: 126 MMHG | TEMPERATURE: 98.9 F | HEIGHT: 63 IN | BODY MASS INDEX: 43.05 KG/M2 | DIASTOLIC BLOOD PRESSURE: 80 MMHG | WEIGHT: 243 LBS | OXYGEN SATURATION: 98 %

## 2023-06-27 DIAGNOSIS — R10.12 LEFT UPPER QUADRANT ABDOMINAL PAIN: ICD-10-CM

## 2023-06-27 DIAGNOSIS — R11.2 NAUSEA AND VOMITING, UNSPECIFIED VOMITING TYPE: ICD-10-CM

## 2023-06-27 DIAGNOSIS — R11.2 NAUSEA AND VOMITING, UNSPECIFIED VOMITING TYPE: Primary | ICD-10-CM

## 2023-06-27 DIAGNOSIS — I10 ESSENTIAL HYPERTENSION: ICD-10-CM

## 2023-06-27 DIAGNOSIS — F33.8 SEASONAL AFFECTIVE DISORDER (HCC): ICD-10-CM

## 2023-06-27 PROBLEM — T78.00XA ANAPHYLAXIS DUE TO FOOD: Status: ACTIVE | Noted: 2023-06-27

## 2023-06-27 LAB
ABSOLUTE EOS #: 0.13 K/UL (ref 0–0.44)
ABSOLUTE IMMATURE GRANULOCYTE: 0.03 K/UL (ref 0–0.3)
ABSOLUTE LYMPH #: 2.95 K/UL (ref 1.1–3.7)
ABSOLUTE MONO #: 0.79 K/UL (ref 0.1–1.2)
ALBUMIN SERPL-MCNC: 4.2 G/DL (ref 3.5–5.2)
ALBUMIN/GLOBULIN RATIO: 1.7 (ref 1–2.5)
ALP BLD-CCNC: 54 U/L (ref 35–104)
ALT SERPL-CCNC: 40 U/L (ref 5–33)
AST SERPL-CCNC: 63 U/L
BASOPHILS # BLD: 1 % (ref 0–2)
BASOPHILS ABSOLUTE: 0.05 K/UL (ref 0–0.2)
BILIRUB SERPL-MCNC: 0.3 MG/DL (ref 0.3–1.2)
BILIRUBIN DIRECT: 0.1 MG/DL
BILIRUBIN, INDIRECT: 0.2 MG/DL (ref 0–1)
BILIRUBIN, POC: NORMAL
BLOOD URINE, POC: NORMAL
CLARITY, POC: NORMAL
COLOR, POC: NORMAL
CONTROL: NORMAL
EOSINOPHILS RELATIVE PERCENT: 2 % (ref 1–4)
GLUCOSE URINE, POC: NORMAL
HCT VFR BLD CALC: 41.8 % (ref 36.3–47.1)
HEMOGLOBIN: 13.7 G/DL (ref 11.9–15.1)
IMMATURE GRANULOCYTES: 0 %
KETONES, POC: NORMAL
LEUKOCYTE EST, POC: NORMAL
LIPASE: 37 U/L (ref 13–60)
LYMPHOCYTES # BLD: 37 % (ref 24–43)
MCH RBC QN AUTO: 32.8 PG (ref 25.2–33.5)
MCHC RBC AUTO-ENTMCNC: 32.8 G/DL (ref 28.4–34.8)
MCV RBC AUTO: 100 FL (ref 82.6–102.9)
MONOCYTES # BLD: 10 % (ref 3–12)
NITRITE, POC: NORMAL
NRBC AUTOMATED: 0 PER 100 WBC
PDW BLD-RTO: 13.1 % (ref 11.8–14.4)
PH, POC: 7
PLATELET # BLD: 311 K/UL (ref 138–453)
PMV BLD AUTO: 10.7 FL (ref 8.1–13.5)
PREGNANCY TEST URINE, POC: NORMAL
PROTEIN, POC: NORMAL
RBC # BLD: 4.18 M/UL (ref 3.95–5.11)
SEG NEUTROPHILS: 50 % (ref 36–65)
SEGMENTED NEUTROPHILS ABSOLUTE COUNT: 4.04 K/UL (ref 1.5–8.1)
SPECIFIC GRAVITY, POC: 1.02
TOTAL PROTEIN: 6.7 G/DL (ref 6.4–8.3)
TRIGL SERPL-MCNC: 219 MG/DL
UROBILINOGEN, POC: 0.2
WBC # BLD: 8 K/UL (ref 3.5–11.3)

## 2023-06-27 PROCEDURE — 81025 URINE PREGNANCY TEST: CPT | Performed by: PHYSICIAN ASSISTANT

## 2023-06-27 PROCEDURE — 81003 URINALYSIS AUTO W/O SCOPE: CPT | Performed by: PHYSICIAN ASSISTANT

## 2023-06-27 PROCEDURE — 3074F SYST BP LT 130 MM HG: CPT | Performed by: PHYSICIAN ASSISTANT

## 2023-06-27 PROCEDURE — 3079F DIAST BP 80-89 MM HG: CPT | Performed by: PHYSICIAN ASSISTANT

## 2023-06-27 PROCEDURE — 99214 OFFICE O/P EST MOD 30 MIN: CPT | Performed by: PHYSICIAN ASSISTANT

## 2023-06-27 RX ORDER — PROMETHAZINE HYDROCHLORIDE 25 MG/1
25 TABLET ORAL 4 TIMES DAILY PRN
Qty: 20 TABLET | Refills: 0 | Status: SHIPPED | OUTPATIENT
Start: 2023-06-27 | End: 2023-07-04

## 2023-06-27 ASSESSMENT — ENCOUNTER SYMPTOMS
ABDOMINAL PAIN: 0
CHEST TIGHTNESS: 0
COUGH: 0
COLOR CHANGE: 0
SHORTNESS OF BREATH: 0
VOMITING: 1
APNEA: 0
NAUSEA: 1
BLOOD IN STOOL: 0

## 2023-06-28 DIAGNOSIS — R11.2 NAUSEA AND VOMITING, UNSPECIFIED VOMITING TYPE: ICD-10-CM

## 2023-06-28 DIAGNOSIS — R79.89 ELEVATED LFTS: Primary | ICD-10-CM

## 2023-07-21 ENCOUNTER — HOSPITAL ENCOUNTER (OUTPATIENT)
Age: 33
Setting detail: SPECIMEN
Discharge: HOME | End: 2023-07-21

## 2023-07-21 DIAGNOSIS — R11.2 NAUSEA AND VOMITING, UNSPECIFIED VOMITING TYPE: ICD-10-CM

## 2023-07-21 DIAGNOSIS — R79.89 ELEVATED LFTS: ICD-10-CM

## 2023-07-21 LAB
HAV IGM SERPL QL IA: NONREACTIVE
HBV SURFACE AB SERPL IA-ACNC: 80.65 MIU/ML
HBV SURFACE AG SERPL QL IA: NONREACTIVE
HCV AB SERPL QL IA: NONREACTIVE

## 2023-07-23 LAB — HBV E AG SERPL QL IA: NEGATIVE

## 2023-07-24 DIAGNOSIS — R79.89 ELEVATED LFTS: Primary | ICD-10-CM

## 2023-07-25 ENCOUNTER — HOSPITAL ENCOUNTER (OUTPATIENT)
Age: 33
Discharge: HOME | End: 2023-07-25
Payer: COMMERCIAL

## 2023-07-25 DIAGNOSIS — R79.89 ELEVATED LFTS: ICD-10-CM

## 2023-07-25 LAB
ALBUMIN SERPL-MCNC: 4.4 G/DL (ref 3.5–5.2)
ALBUMIN/GLOB SERPL: 1.8 {RATIO} (ref 1–2.5)
ALP SERPL-CCNC: 54 U/L (ref 35–104)
ALT SERPL-CCNC: 39 U/L (ref 5–33)
AST SERPL-CCNC: 33 U/L
BILIRUB DIRECT SERPL-MCNC: 0.1 MG/DL
BILIRUB INDIRECT SERPL-MCNC: 0.3 MG/DL (ref 0–1)
BILIRUB SERPL-MCNC: 0.4 MG/DL (ref 0.3–1.2)
PROT SERPL-MCNC: 6.8 G/DL (ref 6.4–8.3)

## 2023-07-25 PROCEDURE — 36415 COLL VENOUS BLD VENIPUNCTURE: CPT

## 2023-07-25 PROCEDURE — 80076 HEPATIC FUNCTION PANEL: CPT

## 2023-10-03 DIAGNOSIS — J45.20 MILD INTERMITTENT REACTIVE AIRWAY DISEASE WITHOUT COMPLICATION: Primary | ICD-10-CM

## 2023-10-03 RX ORDER — MONTELUKAST SODIUM 10 MG/1
TABLET ORAL
Qty: 90 TABLET | Refills: 3 | Status: SHIPPED | OUTPATIENT
Start: 2023-10-03

## 2023-10-03 NOTE — TELEPHONE ENCOUNTER
LAST VISIT:   6/27/2023     Future Appointments   Date Time Provider [address]   1/11/2024  1:00 PM PREETHI Biswas

## 2023-12-21 PROBLEM — Z79.899 MEDICATION MANAGEMENT: Status: ACTIVE | Noted: 2023-12-21

## 2023-12-21 PROBLEM — K76.0 NONALCOHOLIC FATTY LIVER: Status: ACTIVE | Noted: 2023-06-29

## 2024-01-03 DIAGNOSIS — K21.9 GASTROESOPHAGEAL REFLUX DISEASE, UNSPECIFIED WHETHER ESOPHAGITIS PRESENT: Primary | ICD-10-CM

## 2024-01-03 DIAGNOSIS — K31.7 GASTRIC POLYPS: ICD-10-CM

## 2024-01-03 RX ORDER — PANTOPRAZOLE SODIUM 40 MG/1
TABLET, DELAYED RELEASE ORAL
Qty: 90 TABLET | Refills: 0 | Status: SHIPPED | OUTPATIENT
Start: 2024-01-03 | End: 2024-01-05 | Stop reason: SDUPTHER

## 2024-01-03 RX ORDER — FAMOTIDINE 40 MG/1
TABLET, FILM COATED ORAL
Qty: 90 TABLET | Refills: 0 | Status: SHIPPED | OUTPATIENT
Start: 2024-01-03 | End: 2024-01-05 | Stop reason: SDUPTHER

## 2024-01-03 NOTE — TELEPHONE ENCOUNTER
LAST VISIT:   12/21/2023     Future Appointments   Date Time Provider Department Center   1/25/2024  8:00 AM [facility] Mao [facility]

## 2024-01-05 DIAGNOSIS — J45.20 MILD INTERMITTENT REACTIVE AIRWAY DISEASE WITHOUT COMPLICATION: ICD-10-CM

## 2024-01-05 DIAGNOSIS — K31.7 GASTRIC POLYPS: ICD-10-CM

## 2024-01-05 DIAGNOSIS — K21.9 GASTROESOPHAGEAL REFLUX DISEASE, UNSPECIFIED WHETHER ESOPHAGITIS PRESENT: ICD-10-CM

## 2024-01-05 RX ORDER — MONTELUKAST SODIUM 10 MG/1
TABLET ORAL
Qty: 90 TABLET | Refills: 3 | Status: SHIPPED | OUTPATIENT
Start: 2024-01-05

## 2024-01-05 RX ORDER — FAMOTIDINE 40 MG/1
TABLET, FILM COATED ORAL
Qty: 90 TABLET | Refills: 0 | Status: SHIPPED | OUTPATIENT
Start: 2024-01-05

## 2024-01-05 RX ORDER — PANTOPRAZOLE SODIUM 40 MG/1
TABLET, DELAYED RELEASE ORAL
Qty: 90 TABLET | Refills: 0 | Status: SHIPPED | OUTPATIENT
Start: 2024-01-05

## 2024-01-05 NOTE — TELEPHONE ENCOUNTER
LAST VISIT:   12/21/2023     Future Appointments   Date Time Provider Department Center   1/25/2024  8:00 AM [facility]      Patient said she needs scripts sent to [facility] due to insurance

## 2024-01-31 DIAGNOSIS — F33.8 SEASONAL AFFECTIVE DISORDER (HCC): ICD-10-CM

## 2024-01-31 DIAGNOSIS — F41.9 ANXIETY: ICD-10-CM

## 2024-01-31 DIAGNOSIS — F33.0 MILD EPISODE OF RECURRENT MAJOR DEPRESSIVE DISORDER (HCC): ICD-10-CM

## 2024-02-01 RX ORDER — CITALOPRAM HYDROBROMIDE 10 MG/1
10 TABLET ORAL DAILY
Qty: 30 TABLET | Refills: 1 | Status: SHIPPED | OUTPATIENT
Start: 2024-02-01 | End: 2024-02-02 | Stop reason: SDUPTHER

## 2024-02-02 ENCOUNTER — OFFICE VISIT (OUTPATIENT)
Dept: PRIMARY CARE CLINIC | Age: 34
End: 2024-02-02

## 2024-02-02 VITALS
HEIGHT: 63 IN | WEIGHT: 247 LBS | OXYGEN SATURATION: 97 % | DIASTOLIC BLOOD PRESSURE: 90 MMHG | HEART RATE: 78 BPM | BODY MASS INDEX: 43.77 KG/M2 | SYSTOLIC BLOOD PRESSURE: 122 MMHG

## 2024-02-02 DIAGNOSIS — E78.2 MIXED HYPERLIPIDEMIA: ICD-10-CM

## 2024-02-02 DIAGNOSIS — I10 ESSENTIAL HYPERTENSION: ICD-10-CM

## 2024-02-02 DIAGNOSIS — F41.9 ANXIETY: ICD-10-CM

## 2024-02-02 DIAGNOSIS — I10 ESSENTIAL HYPERTENSION: Primary | ICD-10-CM

## 2024-02-02 DIAGNOSIS — F33.8 SEASONAL AFFECTIVE DISORDER (HCC): ICD-10-CM

## 2024-02-02 DIAGNOSIS — Z00.00 HEALTH CARE MAINTENANCE: ICD-10-CM

## 2024-02-02 DIAGNOSIS — F33.0 MILD EPISODE OF RECURRENT MAJOR DEPRESSIVE DISORDER (HCC): ICD-10-CM

## 2024-02-02 LAB
ABSOLUTE IMMATURE GRANULOCYTE: <0.03 K/UL (ref 0–0.3)
ALBUMIN SERPL-MCNC: 4.5 G/DL (ref 3.5–5.2)
ALBUMIN/GLOBULIN RATIO: 2 (ref 1–2.5)
ALP BLD-CCNC: 60 U/L (ref 35–104)
ALT SERPL-CCNC: 38 U/L (ref 10–35)
ANION GAP SERPL CALCULATED.3IONS-SCNC: 11 MMOL/L (ref 9–16)
AST SERPL-CCNC: 28 U/L (ref 10–35)
BASOPHILS ABSOLUTE: 0.07 K/UL (ref 0–0.2)
BASOPHILS RELATIVE PERCENT: 1 % (ref 0–2)
BILIRUB SERPL-MCNC: 0.5 MG/DL (ref 0–1.2)
BUN BLDV-MCNC: 12 MG/DL (ref 6–20)
CALCIUM SERPL-MCNC: 9.7 MG/DL (ref 8.6–10.4)
CHLORIDE BLD-SCNC: 102 MMOL/L (ref 98–107)
CHOLESTEROL/HDL RATIO: 7
CHOLESTEROL: 254 MG/DL (ref 0–199)
CO2: 25 MMOL/L (ref 20–31)
CREAT SERPL-MCNC: 0.7 MG/DL (ref 0.5–0.9)
EOSINOPHILS ABSOLUTE: 0.14 K/UL (ref 0–0.44)
EOSINOPHILS RELATIVE PERCENT: 2 % (ref 1–4)
ESTIMATED AVERAGE GLUCOSE: 91 MG/DL
GFR SERPL CREATININE-BSD FRML MDRD: >60 ML/MIN/1.73M2
GLUCOSE FASTING: 90 MG/DL (ref 74–99)
HBA1C MFR BLD: 4.8 % (ref 4–6)
HCT VFR BLD CALC: 42.1 % (ref 36.3–47.1)
HDLC SERPL-MCNC: 37 MG/DL
HEMOGLOBIN: 14.2 G/DL (ref 11.9–15.1)
IMMATURE GRANULOCYTES: 0 %
LDL CHOLESTEROL: 178 MG/DL (ref 0–100)
LYMPHOCYTES ABSOLUTE: 2.89 K/UL (ref 1.1–3.7)
LYMPHOCYTES RELATIVE PERCENT: 43 % (ref 24–43)
MCH RBC QN AUTO: 32.7 PG (ref 25.2–33.5)
MCHC RBC AUTO-ENTMCNC: 33.7 G/DL (ref 28.4–34.8)
MCV RBC AUTO: 97 FL (ref 82.6–102.9)
MONOCYTES ABSOLUTE: 0.6 K/UL (ref 0.1–1.2)
MONOCYTES RELATIVE PERCENT: 9 % (ref 3–12)
NEUTROPHILS ABSOLUTE: 3 K/UL (ref 1.5–8.1)
NEUTROPHILS RELATIVE PERCENT: 45 % (ref 36–65)
NRBC AUTOMATED: 0 PER 100 WBC
PDW BLD-RTO: 12.3 % (ref 11.8–14.4)
PLATELET # BLD: 313 K/UL (ref 138–453)
PMV BLD AUTO: 10.6 FL (ref 8.1–13.5)
POTASSIUM SERPL-SCNC: 4.2 MMOL/L (ref 3.7–5.3)
RBC # BLD: 4.34 M/UL (ref 3.95–5.11)
SODIUM BLD-SCNC: 138 MMOL/L (ref 136–145)
TOTAL PROTEIN: 6.8 G/DL (ref 6.6–8.7)
TRIGL SERPL-MCNC: 195 MG/DL
TSH SERPL DL<=0.05 MIU/L-ACNC: 1.11 UIU/ML (ref 0.27–4.2)
VLDLC SERPL CALC-MCNC: 39 MG/DL
WBC # BLD: 6.7 K/UL (ref 3.5–11.3)

## 2024-02-02 RX ORDER — CITALOPRAM HYDROBROMIDE 10 MG/1
10 TABLET ORAL DAILY
Qty: 90 TABLET | Refills: 1 | Status: SHIPPED | OUTPATIENT
Start: 2024-02-02 | End: 2024-07-31

## 2024-02-02 ASSESSMENT — PATIENT HEALTH QUESTIONNAIRE - PHQ9
8. MOVING OR SPEAKING SO SLOWLY THAT OTHER PEOPLE COULD HAVE NOTICED. OR THE OPPOSITE - BEING SO FIDGETY OR RESTLESS THAT YOU HAVE BEEN MOVING AROUND A LOT MORE THAN USUAL: NOT AT ALL
6. FEELING BAD ABOUT YOURSELF - OR THAT YOU ARE A FAILURE OR HAVE LET YOURSELF OR YOUR FAMILY DOWN: 0
4. FEELING TIRED OR HAVING LITTLE ENERGY: SEVERAL DAYS
2. FEELING DOWN, DEPRESSED OR HOPELESS: SEVERAL DAYS
7. TROUBLE CONCENTRATING ON THINGS, SUCH AS READING THE NEWSPAPER OR WATCHING TELEVISION: NEARLY EVERY DAY
6. FEELING BAD ABOUT YOURSELF - OR THAT YOU ARE A FAILURE OR HAVE LET YOURSELF OR YOUR FAMILY DOWN: NOT AT ALL
10. IF YOU CHECKED OFF ANY PROBLEMS, HOW DIFFICULT HAVE THESE PROBLEMS MADE IT FOR YOU TO DO YOUR WORK, TAKE CARE OF THINGS AT HOME, OR GET ALONG WITH OTHER PEOPLE: 1
9. THOUGHTS THAT YOU WOULD BE BETTER OFF DEAD, OR OF HURTING YOURSELF: NOT AT ALL
3. TROUBLE FALLING OR STAYING ASLEEP: SEVERAL DAYS
7. TROUBLE CONCENTRATING ON THINGS, SUCH AS READING THE NEWSPAPER OR WATCHING TELEVISION: 3
10. IF YOU CHECKED OFF ANY PROBLEMS, HOW DIFFICULT HAVE THESE PROBLEMS MADE IT FOR YOU TO DO YOUR WORK, TAKE CARE OF THINGS AT HOME, OR GET ALONG WITH OTHER PEOPLE: SOMEWHAT DIFFICULT
1. LITTLE INTEREST OR PLEASURE IN DOING THINGS: SEVERAL DAYS
5. POOR APPETITE OR OVEREATING: NOT AT ALL
8. MOVING OR SPEAKING SO SLOWLY THAT OTHER PEOPLE COULD HAVE NOTICED. OR THE OPPOSITE, BEING SO FIGETY OR RESTLESS THAT YOU HAVE BEEN MOVING AROUND A LOT MORE THAN USUAL: 0
3. TROUBLE FALLING OR STAYING ASLEEP: 1
9. THOUGHTS THAT YOU WOULD BE BETTER OFF DEAD, OR OF HURTING YOURSELF: 0
2. FEELING DOWN, DEPRESSED OR HOPELESS: 1
SUM OF ALL RESPONSES TO PHQ QUESTIONS 1-9: 7
5. POOR APPETITE OR OVEREATING: 0
SUM OF ALL RESPONSES TO PHQ QUESTIONS 1-9: 7
SUM OF ALL RESPONSES TO PHQ9 QUESTIONS 1 & 2: 2
SUM OF ALL RESPONSES TO PHQ QUESTIONS 1-9: 7
4. FEELING TIRED OR HAVING LITTLE ENERGY: 1
SUM OF ALL RESPONSES TO PHQ QUESTIONS 1-9: 7
SUM OF ALL RESPONSES TO PHQ QUESTIONS 1-9: 7
1. LITTLE INTEREST OR PLEASURE IN DOING THINGS: 1

## 2024-02-02 ASSESSMENT — ENCOUNTER SYMPTOMS
ABDOMINAL PAIN: 0
CHEST TIGHTNESS: 0
NAUSEA: 0
COLOR CHANGE: 0
BACK PAIN: 0
SHORTNESS OF BREATH: 0
COUGH: 0
VOMITING: 0
APNEA: 0

## 2024-02-02 NOTE — PROGRESS NOTES
for color change.   Neurological:  Negative for dizziness, seizures, syncope, weakness, light-headedness and headaches.   Psychiatric/Behavioral:  Negative for agitation, behavioral problems, confusion, decreased concentration, dysphoric mood, hallucinations, self-injury, sleep disturbance and suicidal ideas. The patient is not nervous/anxious and is not hyperactive.        Objective:     BP (!) 122/90   Pulse 78   Ht 1.6 m (5' 3\")   Wt 112 kg (247 lb)   SpO2 97%   BMI 43.75 kg/m²   Physical Exam  Vitals and nursing note reviewed.   Constitutional:       Appearance: Normal appearance.   Neck:      Vascular: No carotid bruit.   Cardiovascular:      Rate and Rhythm: Normal rate and regular rhythm.      Heart sounds: Normal heart sounds. No murmur heard.     No friction rub. No gallop.   Pulmonary:      Effort: Pulmonary effort is normal.      Breath sounds: Normal breath sounds.   Musculoskeletal:      Cervical back: No edema.      Right lower leg: No edema.      Left lower leg: No edema.   Neurological:      Mental Status: She is alert and oriented to person, place, and time.   Psychiatric:         Mood and Affect: Mood normal.         Assessment:       Diagnosis Orders   1. Essential hypertension  Comprehensive Metabolic Panel, Fasting      2. Mixed hyperlipidemia  Lipid Panel      3. Health care maintenance  CBC with Auto Differential    TSH with Reflex    Hemoglobin A1C      4. Mild episode of recurrent major depressive disorder (HCC)  citalopram (CELEXA) 10 MG tablet      5. Anxiety  citalopram (CELEXA) 10 MG tablet      6. Seasonal affective disorder (HCC)  citalopram (CELEXA) 10 MG tablet           Plan:    REcheck BP  Continue Celexa 10mg and counseling.   BW ordered  Monitor BPs  Return in about 3 weeks (around 2/23/2024) for HTN and labs.    Orders Placed This Encounter   Procedures    Lipid Panel     Standing Status:   Future     Standing Expiration Date:   2/2/2025     Order Specific Question:   Is

## 2024-02-21 PROBLEM — F33.9 EPISODE OF RECURRENT MAJOR DEPRESSIVE DISORDER (HCC): Status: ACTIVE | Noted: 2024-02-21

## 2024-02-21 PROBLEM — F43.10 PTSD (POST-TRAUMATIC STRESS DISORDER): Status: ACTIVE | Noted: 2024-02-21

## 2024-02-21 PROBLEM — F41.1 GENERALIZED ANXIETY DISORDER: Status: ACTIVE | Noted: 2024-02-21

## 2024-02-21 PROBLEM — F98.8 ADD (ATTENTION DEFICIT DISORDER) WITHOUT HYPERACTIVITY: Status: ACTIVE | Noted: 2024-02-21

## 2024-02-21 PROBLEM — F41.1 GENERALIZED ANXIETY DISORDER: Chronic | Status: ACTIVE | Noted: 2024-02-21

## 2024-02-21 ASSESSMENT — ENCOUNTER SYMPTOMS
VOMITING: 0
SHORTNESS OF BREATH: 0
BACK PAIN: 0
NAUSEA: 0

## 2024-02-21 NOTE — PROGRESS NOTES
TIMES A DAY AS NEEDED FOR WHEEZING 18 g 3    diphenhydrAMINE (BENADRYL) 25 MG tablet Take 2 tablets by mouth every 6 hours as needed for Itching      Cetirizine HCl 10 MG CAPS Take by mouth      Cholecalciferol (VITAMIN D3 PO) Take by mouth daily       No current facility-administered medications for this visit.     Allergies   Allergen Reactions    Latex Rash    Avocado Anaphylaxis    Banana Anaphylaxis    Amlodipine Swelling    Menthol (Topical Analgesic) [Menthol (Topical Analgesic)] Other (See Comments)     Burning and rash    Sudafed [Pseudoephedrine Hcl]     Augmentin [Amoxicillin-Pot Clavulanate] Nausea And Vomiting    Tape [Adhesive Tape] Rash       Health Maintenance   Topic Date Due    HIV screen  Never done    HPV vaccine (2 - 3-dose series) 10/09/2009    Varicella vaccine (2 of 2 - 13+ 2-dose series) 08/24/2011    COVID-19 Vaccine (3 - 2023-24 season) 09/01/2023    Flu vaccine (1) 12/21/2024 (Originally 8/1/2023)    Depression Monitoring  02/02/2025    Cervical cancer screen  08/30/2027    DTaP/Tdap/Td vaccine (2 - Td or Tdap) 12/30/2031    Hepatitis B vaccine  Completed    Hepatitis C screen  Completed    Hepatitis A vaccine  Aged Out    Hib vaccine  Aged Out    Polio vaccine  Aged Out    Meningococcal (ACWY) vaccine  Aged Out    Pneumococcal 0-64 years Vaccine  Aged Out       Subjective:      Review of Systems   Constitutional:  Negative for activity change, appetite change and fatigue.   Respiratory:  Negative for apnea, cough, chest tightness and shortness of breath.    Cardiovascular:  Negative for chest pain, palpitations and leg swelling.   Gastrointestinal:  Negative for abdominal pain, nausea and vomiting.   Endocrine: Negative for cold intolerance and heat intolerance.   Musculoskeletal:  Negative for arthralgias, back pain, myalgias and neck pain.   Skin:  Negative for color change.   Neurological:  Negative for dizziness, seizures, syncope, weakness, light-headedness and headaches.

## 2024-02-22 ENCOUNTER — OFFICE VISIT (OUTPATIENT)
Dept: PRIMARY CARE CLINIC | Age: 34
End: 2024-02-22
Payer: COMMERCIAL

## 2024-02-22 VITALS
SYSTOLIC BLOOD PRESSURE: 126 MMHG | BODY MASS INDEX: 43.41 KG/M2 | DIASTOLIC BLOOD PRESSURE: 86 MMHG | OXYGEN SATURATION: 97 % | WEIGHT: 245 LBS | HEIGHT: 63 IN | HEART RATE: 82 BPM

## 2024-02-22 DIAGNOSIS — D13.4 HEPATIC ADENOMA: ICD-10-CM

## 2024-02-22 DIAGNOSIS — K75.81 NASH (NONALCOHOLIC STEATOHEPATITIS): ICD-10-CM

## 2024-02-22 DIAGNOSIS — F43.10 PTSD (POST-TRAUMATIC STRESS DISORDER): ICD-10-CM

## 2024-02-22 DIAGNOSIS — E66.01 OBESITY, CLASS III, BMI 40-49.9 (MORBID OBESITY) (HCC): ICD-10-CM

## 2024-02-22 DIAGNOSIS — F33.1 MODERATE EPISODE OF RECURRENT MAJOR DEPRESSIVE DISORDER (HCC): ICD-10-CM

## 2024-02-22 DIAGNOSIS — F41.1 GENERALIZED ANXIETY DISORDER: ICD-10-CM

## 2024-02-22 DIAGNOSIS — L73.2 HIDRADENITIS SUPPURATIVA: ICD-10-CM

## 2024-02-22 DIAGNOSIS — E78.2 MIXED HYPERLIPIDEMIA: Primary | ICD-10-CM

## 2024-02-22 PROCEDURE — 3079F DIAST BP 80-89 MM HG: CPT | Performed by: PHYSICIAN ASSISTANT

## 2024-02-22 PROCEDURE — 3074F SYST BP LT 130 MM HG: CPT | Performed by: PHYSICIAN ASSISTANT

## 2024-02-22 PROCEDURE — 99214 OFFICE O/P EST MOD 30 MIN: CPT | Performed by: PHYSICIAN ASSISTANT

## 2024-02-22 RX ORDER — ATORVASTATIN CALCIUM 20 MG/1
20 TABLET, FILM COATED ORAL DAILY
Qty: 90 TABLET | Refills: 0 | Status: SHIPPED | OUTPATIENT
Start: 2024-02-22

## 2024-02-22 ASSESSMENT — ENCOUNTER SYMPTOMS
APNEA: 0
COLOR CHANGE: 0
CHEST TIGHTNESS: 0
COUGH: 0
ABDOMINAL PAIN: 0

## 2024-03-14 ENCOUNTER — OFFICE VISIT (OUTPATIENT)
Dept: PRIMARY CARE CLINIC | Age: 34
End: 2024-03-14
Payer: COMMERCIAL

## 2024-03-14 VITALS
DIASTOLIC BLOOD PRESSURE: 82 MMHG | SYSTOLIC BLOOD PRESSURE: 112 MMHG | BODY MASS INDEX: 43.41 KG/M2 | HEART RATE: 88 BPM | HEIGHT: 63 IN | OXYGEN SATURATION: 97 %

## 2024-03-14 DIAGNOSIS — H65.111 ACUTE MUCOID OTITIS MEDIA OF RIGHT EAR: Primary | ICD-10-CM

## 2024-03-14 DIAGNOSIS — J34.89 SINUS PRESSURE: ICD-10-CM

## 2024-03-14 PROCEDURE — 3074F SYST BP LT 130 MM HG: CPT | Performed by: PHYSICIAN ASSISTANT

## 2024-03-14 PROCEDURE — 3079F DIAST BP 80-89 MM HG: CPT | Performed by: PHYSICIAN ASSISTANT

## 2024-03-14 PROCEDURE — 99213 OFFICE O/P EST LOW 20 MIN: CPT | Performed by: PHYSICIAN ASSISTANT

## 2024-03-14 RX ORDER — FLUTICASONE PROPIONATE 50 MCG
1 SPRAY, SUSPENSION (ML) NASAL DAILY
COMMUNITY
Start: 2024-03-12 | End: 2024-03-19

## 2024-03-14 RX ORDER — LORATADINE 10 MG/1
10 TABLET ORAL DAILY
COMMUNITY
Start: 2024-03-12 | End: 2024-03-26

## 2024-03-14 RX ORDER — AMOXICILLIN 500 MG/1
500 CAPSULE ORAL 3 TIMES DAILY
Qty: 21 CAPSULE | Refills: 0 | Status: SHIPPED | OUTPATIENT
Start: 2024-03-14

## 2024-03-14 RX ORDER — M-VIT,TX,IRON,MINS/CALC/FOLIC 27MG-0.4MG
1 TABLET ORAL DAILY
COMMUNITY

## 2024-03-14 ASSESSMENT — ENCOUNTER SYMPTOMS: RESPIRATORY NEGATIVE: 1

## 2024-03-14 NOTE — PROGRESS NOTES
agreed with treatment plan. Follow up as directed.     Electronically signed by Sanjana Mao PA-C on 3/14/2024 at 11:56 AM

## 2024-04-03 DIAGNOSIS — K31.7 GASTRIC POLYPS: ICD-10-CM

## 2024-04-03 DIAGNOSIS — K21.9 GASTROESOPHAGEAL REFLUX DISEASE, UNSPECIFIED WHETHER ESOPHAGITIS PRESENT: ICD-10-CM

## 2024-04-03 DIAGNOSIS — J45.20 MILD INTERMITTENT REACTIVE AIRWAY DISEASE WITHOUT COMPLICATION: ICD-10-CM

## 2024-04-03 RX ORDER — ATENOLOL 25 MG/1
25 TABLET ORAL DAILY
Qty: 90 TABLET | Refills: 0 | Status: SHIPPED | OUTPATIENT
Start: 2024-04-03

## 2024-04-03 RX ORDER — PANTOPRAZOLE SODIUM 40 MG/1
TABLET, DELAYED RELEASE ORAL
Qty: 90 TABLET | Refills: 0 | Status: SHIPPED | OUTPATIENT
Start: 2024-04-03

## 2024-04-03 RX ORDER — FAMOTIDINE 40 MG/1
TABLET, FILM COATED ORAL
Qty: 90 TABLET | Refills: 0 | Status: SHIPPED | OUTPATIENT
Start: 2024-04-03

## 2024-04-03 RX ORDER — MONTELUKAST SODIUM 10 MG/1
TABLET ORAL
Qty: 90 TABLET | Refills: 3 | Status: SHIPPED | OUTPATIENT
Start: 2024-04-03

## 2024-05-24 DIAGNOSIS — E78.2 MIXED HYPERLIPIDEMIA: ICD-10-CM

## 2024-05-24 RX ORDER — ATORVASTATIN CALCIUM 20 MG/1
20 TABLET, FILM COATED ORAL DAILY
Qty: 90 TABLET | Refills: 3 | Status: SHIPPED | OUTPATIENT
Start: 2024-05-24

## 2024-07-02 RX ORDER — ATENOLOL 25 MG/1
25 TABLET ORAL DAILY
Qty: 90 TABLET | Refills: 0 | Status: SHIPPED | OUTPATIENT
Start: 2024-07-02

## 2024-07-02 NOTE — TELEPHONE ENCOUNTER
LAST VISIT:   3/14/2024     Future Appointments   Date Time Provider Department Center   8/22/2024  1:00 PM [facility]

## 2024-07-31 DIAGNOSIS — K31.7 GASTRIC POLYPS: ICD-10-CM

## 2024-07-31 RX ORDER — PANTOPRAZOLE SODIUM 40 MG/1
TABLET, DELAYED RELEASE ORAL
Qty: 90 TABLET | Refills: 0 | Status: SHIPPED | OUTPATIENT
Start: 2024-07-31

## 2024-08-02 DIAGNOSIS — F33.8 SEASONAL AFFECTIVE DISORDER (HCC): ICD-10-CM

## 2024-08-02 DIAGNOSIS — F41.9 ANXIETY: ICD-10-CM

## 2024-08-02 DIAGNOSIS — F33.0 MILD EPISODE OF RECURRENT MAJOR DEPRESSIVE DISORDER (HCC): ICD-10-CM

## 2024-08-02 RX ORDER — CITALOPRAM HYDROBROMIDE 10 MG/1
10 TABLET ORAL DAILY
Qty: 90 TABLET | Refills: 1 | Status: SHIPPED | OUTPATIENT
Start: 2024-08-02 | End: 2025-01-29

## 2024-08-02 NOTE — TELEPHONE ENCOUNTER
LAST VISIT:   3/14/2024     Future Appointments   Date Time Provider Department Center   8/22/2024  1:00 PM Sanjana Mao PA-C [facility]       Pharmacy verified? Yes     [facility] [id number] - [place] - [address] [phone] - F [phone]  [address] [place] [address]  Phone: [phone] Fax: [phone]

## 2024-08-19 DIAGNOSIS — K21.9 GASTROESOPHAGEAL REFLUX DISEASE, UNSPECIFIED WHETHER ESOPHAGITIS PRESENT: ICD-10-CM

## 2024-08-19 RX ORDER — FAMOTIDINE 40 MG/1
TABLET, FILM COATED ORAL
Qty: 90 TABLET | Refills: 1 | Status: SHIPPED | OUTPATIENT
Start: 2024-08-19

## 2024-08-20 PROBLEM — H65.93 FLUID LEVEL BEHIND TYMPANIC MEMBRANE OF BOTH EARS: Status: RESOLVED | Noted: 2020-02-19 | Resolved: 2024-08-20

## 2024-08-20 ASSESSMENT — ENCOUNTER SYMPTOMS
COUGH: 0
ABDOMINAL PAIN: 0
SHORTNESS OF BREATH: 0
APNEA: 0
CHEST TIGHTNESS: 0
COLOR CHANGE: 0
BACK PAIN: 0

## 2024-08-20 NOTE — PROGRESS NOTES
[facility]  [facility] PRIMARY CARE  [address]  [place] [address]  Dept: [phone]    Bob Pete is a 34 y.o. female who presents today for her medical conditions/complaints as noted below.      Chief Complaint   Patient presents with    Hypertension     6 month follow up       HPI:     HPI  Weight: gained 3 pounds  Lipids: did not have yet. Did not start the statin med at advice of OB. Lipids in 2/2024 were quite high with ratio of 7.0  BPs running ok    No components found for: \"LDLCHOLESTEROL\", \"LDLCALC\"    (goal LDL is <100)   AST (U/L)   Date Value   02/02/2024 28     ALT (U/L)   Date Value   02/02/2024 38 (H)     BUN (mg/dL)   Date Value   02/02/2024 12     BP Readings from Last 3 Encounters:   08/22/24 130/82   03/14/24 112/82   02/22/24 126/86          (goal 120/80)    Past Medical History:   Diagnosis Date    ADD (attention deficit disorder) without hyperactivity 2/21/2024    Asthma     Calculus of kidney 1/12/2023    Chronic bilateral low back pain without sciatica 4/26/2021    Class 3 severe obesity with serious comorbidity in adult (HCC) 7/13/2021    Depression with anxiety 6/8/2021    Gastroparesis     GERD (gastroesophageal reflux disease)     Headache     Hepatic adenoma     has referral to [facility]    Hyperlipidemia     Hypertension     Insulin resistance     Irritable bowel syndrome     Nonalcoholic fatty liver 6/29/2023    Scoliosis     has an S curve     Seasonal affective disorder (HCC) 3/2/2021    Wears glasses       Past Surgical History:   Procedure Laterality Date    ADENOIDECTOMY      BRONCHOSCOPY      CHOLECYSTECTOMY  02/01/2013    COLONOSCOPY      COLPOSCOPY  10/04/2021    SHOULDER SURGERY Left     bone spur    TONSILLECTOMY      TYMPANOSTOMY TUBE PLACEMENT      UPPER GASTROINTESTINAL ENDOSCOPY N/A 06/08/2020    EGD BIOPSY performed by Jovanna Chavis MD at [facility]       Family History   Problem Relation Age of Onset

## 2024-08-22 ENCOUNTER — OFFICE VISIT (OUTPATIENT)
Dept: PRIMARY CARE CLINIC | Age: 34
End: 2024-08-22
Payer: COMMERCIAL

## 2024-08-22 VITALS
WEIGHT: 248.8 LBS | HEART RATE: 85 BPM | HEIGHT: 63 IN | DIASTOLIC BLOOD PRESSURE: 82 MMHG | SYSTOLIC BLOOD PRESSURE: 130 MMHG | OXYGEN SATURATION: 95 % | BODY MASS INDEX: 44.08 KG/M2

## 2024-08-22 DIAGNOSIS — E78.2 MIXED HYPERLIPIDEMIA: ICD-10-CM

## 2024-08-22 DIAGNOSIS — E66.01 CLASS 3 SEVERE OBESITY WITH SERIOUS COMORBIDITY AND BODY MASS INDEX (BMI) OF 45.0 TO 49.9 IN ADULT, UNSPECIFIED OBESITY TYPE (HCC): ICD-10-CM

## 2024-08-22 DIAGNOSIS — K75.81 NASH (NONALCOHOLIC STEATOHEPATITIS): ICD-10-CM

## 2024-08-22 DIAGNOSIS — I10 ESSENTIAL HYPERTENSION: Primary | ICD-10-CM

## 2024-08-22 DIAGNOSIS — N97.0 INFERTILITY ASSOCIATED WITH ANOVULATION: ICD-10-CM

## 2024-08-22 PROCEDURE — 99214 OFFICE O/P EST MOD 30 MIN: CPT | Performed by: PHYSICIAN ASSISTANT

## 2024-08-22 PROCEDURE — 3075F SYST BP GE 130 - 139MM HG: CPT | Performed by: PHYSICIAN ASSISTANT

## 2024-08-22 PROCEDURE — 3079F DIAST BP 80-89 MM HG: CPT | Performed by: PHYSICIAN ASSISTANT

## 2024-09-24 RX ORDER — ATENOLOL 25 MG/1
25 TABLET ORAL DAILY
Qty: 90 TABLET | Refills: 1 | Status: SHIPPED | OUTPATIENT
Start: 2024-09-24

## 2024-10-14 DIAGNOSIS — K31.7 GASTRIC POLYPS: ICD-10-CM

## 2024-10-15 RX ORDER — PANTOPRAZOLE SODIUM 40 MG/1
TABLET, DELAYED RELEASE ORAL
Qty: 90 TABLET | Refills: 1 | Status: SHIPPED | OUTPATIENT
Start: 2024-10-15

## 2024-10-16 ENCOUNTER — OFFICE VISIT (OUTPATIENT)
Dept: PRIMARY CARE CLINIC | Age: 34
End: 2024-10-16
Payer: COMMERCIAL

## 2024-10-16 VITALS
WEIGHT: 243 LBS | HEART RATE: 83 BPM | BODY MASS INDEX: 43.05 KG/M2 | OXYGEN SATURATION: 99 % | DIASTOLIC BLOOD PRESSURE: 84 MMHG | SYSTOLIC BLOOD PRESSURE: 132 MMHG | HEIGHT: 63 IN

## 2024-10-16 DIAGNOSIS — R11.0 NAUSEA: Primary | ICD-10-CM

## 2024-10-16 DIAGNOSIS — K52.9 GASTROENTERITIS: ICD-10-CM

## 2024-10-16 PROCEDURE — 3075F SYST BP GE 130 - 139MM HG: CPT | Performed by: STUDENT IN AN ORGANIZED HEALTH CARE EDUCATION/TRAINING PROGRAM

## 2024-10-16 PROCEDURE — 3079F DIAST BP 80-89 MM HG: CPT | Performed by: STUDENT IN AN ORGANIZED HEALTH CARE EDUCATION/TRAINING PROGRAM

## 2024-10-16 PROCEDURE — 99213 OFFICE O/P EST LOW 20 MIN: CPT | Performed by: STUDENT IN AN ORGANIZED HEALTH CARE EDUCATION/TRAINING PROGRAM

## 2024-10-16 RX ORDER — PROMETHAZINE HYDROCHLORIDE 12.5 MG/1
12.5 TABLET ORAL 3 TIMES DAILY PRN
Qty: 12 TABLET | Refills: 0 | Status: SHIPPED | OUTPATIENT
Start: 2024-10-16 | End: 2024-10-23

## 2024-10-16 ASSESSMENT — ENCOUNTER SYMPTOMS
NAUSEA: 1
SHORTNESS OF BREATH: 0
EYE DISCHARGE: 1
EYE REDNESS: 1
ABDOMINAL PAIN: 1
DIARRHEA: 1

## 2024-10-16 NOTE — PROGRESS NOTES
TONSILLECTOMY      TYMPANOSTOMY TUBE PLACEMENT      UPPER GASTROINTESTINAL ENDOSCOPY N/A 06/08/2020    EGD BIOPSY performed by Jovanna Chavis MD at [facility]       Family History   Problem Relation Age of Onset    Depression Mother     Other Mother         pancreatic issues.    High Blood Pressure Mother     High Cholesterol Mother     No Known Problems Father     Other Maternal Grandmother         vascular disease    High Blood Pressure Maternal Grandmother     High Cholesterol Maternal Grandmother     Diabetes Maternal Grandfather     High Cholesterol Maternal Grandfather     No Known Problems Paternal Grandmother     No Known Problems Paternal Grandfather     Other Sister         melonoma    Depression Sister     Anxiety Disorder Sister     Cervical Cancer Maternal Aunt 20       Social History     Tobacco Use    Smoking status: Never    Smokeless tobacco: Never   Substance Use Topics    Alcohol use: Not Currently      Current Outpatient Medications   Medication Sig Dispense Refill    promethazine (PHENERGAN) 12.5 MG tablet Take 1 tablet by mouth 3 times daily as needed for Nausea 12 tablet 0    pantoprazole (PROTONIX) 40 MG tablet TAKE 1 TABLET BY MOUTH DAILY 90 tablet 1    atenolol (TENORMIN) 25 MG tablet TAKE 1 TABLET BY MOUTH DAILY 90 tablet 1    famotidine (PEPCID) 40 MG tablet TAKE 1 TABLET BY MOUTH DAILY 90 tablet 1    citalopram (CELEXA) 10 MG tablet TAKE 1 TABLET BY MOUTH DAILY 90 tablet 1    montelukast (SINGULAIR) 10 MG tablet TAKE ONE TABLET BY MOUTH ONCE NIGHTLY 90 tablet 3    EPINEPHrine (EPIPEN) 0.3 MG/0.3ML SOAJ injection INJECT INTO THE MIDDLE OF THE OUTER THIGH AND HOLD FOR THREE SECONDS AS NEEDED FOR SEVERE ALLERGIC REACTION THEN CALL 911 IF USED 2 each 1    tacrolimus (PROTOPIC) 0.1 % ointment Apply topically 2 times daily. 15 g 0    diphenhydrAMINE (BENADRYL) 25 MG tablet Take 2 tablets by mouth every 6 hours as needed for Itching      Cetirizine HCl 10 MG CAPS Take by mouth

## 2025-01-15 ENCOUNTER — OFFICE VISIT (OUTPATIENT)
Dept: PRIMARY CARE CLINIC | Age: 35
End: 2025-01-15
Payer: COMMERCIAL

## 2025-01-15 VITALS — BODY MASS INDEX: 42.88 KG/M2 | WEIGHT: 242 LBS | HEART RATE: 108 BPM | OXYGEN SATURATION: 96 % | HEIGHT: 63 IN

## 2025-01-15 DIAGNOSIS — R09.89 UPPER RESPIRATORY SYMPTOM: ICD-10-CM

## 2025-01-15 DIAGNOSIS — H65.06 RECURRENT ACUTE SEROUS OTITIS MEDIA OF BOTH EARS: ICD-10-CM

## 2025-01-15 DIAGNOSIS — Z87.892 HISTORY OF ANAPHYLAXIS: Primary | ICD-10-CM

## 2025-01-15 DIAGNOSIS — Z91.018 ALLERGY TO FOOD: ICD-10-CM

## 2025-01-15 PROBLEM — K76.0 NONALCOHOLIC FATTY LIVER: Status: ACTIVE | Noted: 2023-06-29

## 2025-01-15 PROBLEM — F33.9 RECURRENT MAJOR DEPRESSIVE EPISODES (HCC): Status: ACTIVE | Noted: 2021-03-02

## 2025-01-15 PROBLEM — F90.0 ATTENTION DEFICIT HYPERACTIVITY DISORDER (ADHD), PREDOMINANTLY INATTENTIVE TYPE: Status: ACTIVE | Noted: 2024-02-21

## 2025-01-15 PROBLEM — F41.8 MIXED ANXIETY DEPRESSIVE DISORDER: Status: ACTIVE | Noted: 2021-06-08

## 2025-01-15 PROCEDURE — 99213 OFFICE O/P EST LOW 20 MIN: CPT | Performed by: STUDENT IN AN ORGANIZED HEALTH CARE EDUCATION/TRAINING PROGRAM

## 2025-01-15 RX ORDER — BENZONATATE 100 MG/1
100 CAPSULE ORAL 3 TIMES DAILY PRN
Qty: 30 CAPSULE | Refills: 0 | Status: SHIPPED | OUTPATIENT
Start: 2025-01-15 | End: 2025-01-25

## 2025-01-15 RX ORDER — METFORMIN HYDROCHLORIDE 500 MG/1
500 TABLET, EXTENDED RELEASE ORAL
COMMUNITY

## 2025-01-15 RX ORDER — LEVOTHYROXINE SODIUM 50 UG/1
50 TABLET ORAL DAILY
COMMUNITY

## 2025-01-15 RX ORDER — AZITHROMYCIN 250 MG/1
TABLET, FILM COATED ORAL
Qty: 6 TABLET | Refills: 0 | Status: SHIPPED | OUTPATIENT
Start: 2025-01-15 | End: 2025-01-25

## 2025-01-15 SDOH — ECONOMIC STABILITY: FOOD INSECURITY: WITHIN THE PAST 12 MONTHS, THE FOOD YOU BOUGHT JUST DIDN'T LAST AND YOU DIDN'T HAVE MONEY TO GET MORE.: NEVER TRUE

## 2025-01-15 SDOH — ECONOMIC STABILITY: INCOME INSECURITY: IN THE LAST 12 MONTHS, WAS THERE A TIME WHEN YOU WERE NOT ABLE TO PAY THE MORTGAGE OR RENT ON TIME?: NO

## 2025-01-15 SDOH — ECONOMIC STABILITY: FOOD INSECURITY: WITHIN THE PAST 12 MONTHS, YOU WORRIED THAT YOUR FOOD WOULD RUN OUT BEFORE YOU GOT MONEY TO BUY MORE.: NEVER TRUE

## 2025-01-15 SDOH — ECONOMIC STABILITY: TRANSPORTATION INSECURITY
IN THE PAST 12 MONTHS, HAS LACK OF TRANSPORTATION KEPT YOU FROM MEETINGS, WORK, OR FROM GETTING THINGS NEEDED FOR DAILY LIVING?: NO

## 2025-01-15 SDOH — ECONOMIC STABILITY: TRANSPORTATION INSECURITY
IN THE PAST 12 MONTHS, HAS THE LACK OF TRANSPORTATION KEPT YOU FROM MEDICAL APPOINTMENTS OR FROM GETTING MEDICATIONS?: NO

## 2025-01-15 ASSESSMENT — PATIENT HEALTH QUESTIONNAIRE - PHQ9
SUM OF ALL RESPONSES TO PHQ QUESTIONS 1-9: 5
10. IF YOU CHECKED OFF ANY PROBLEMS, HOW DIFFICULT HAVE THESE PROBLEMS MADE IT FOR YOU TO DO YOUR WORK, TAKE CARE OF THINGS AT HOME, OR GET ALONG WITH OTHER PEOPLE: SOMEWHAT DIFFICULT
SUM OF ALL RESPONSES TO PHQ QUESTIONS 1-9: 5
5. POOR APPETITE OR OVEREATING: SEVERAL DAYS
3. TROUBLE FALLING OR STAYING ASLEEP: SEVERAL DAYS
1. LITTLE INTEREST OR PLEASURE IN DOING THINGS: SEVERAL DAYS
3. TROUBLE FALLING OR STAYING ASLEEP: SEVERAL DAYS
6. FEELING BAD ABOUT YOURSELF - OR THAT YOU ARE A FAILURE OR HAVE LET YOURSELF OR YOUR FAMILY DOWN: NOT AT ALL
9. THOUGHTS THAT YOU WOULD BE BETTER OFF DEAD, OR OF HURTING YOURSELF: NOT AT ALL
9. THOUGHTS THAT YOU WOULD BE BETTER OFF DEAD, OR OF HURTING YOURSELF: NOT AT ALL
5. POOR APPETITE OR OVEREATING: SEVERAL DAYS
1. LITTLE INTEREST OR PLEASURE IN DOING THINGS: SEVERAL DAYS
4. FEELING TIRED OR HAVING LITTLE ENERGY: SEVERAL DAYS
SUM OF ALL RESPONSES TO PHQ9 QUESTIONS 1 & 2: 1
2. FEELING DOWN, DEPRESSED OR HOPELESS: NOT AT ALL
6. FEELING BAD ABOUT YOURSELF - OR THAT YOU ARE A FAILURE OR HAVE LET YOURSELF OR YOUR FAMILY DOWN: NOT AT ALL
4. FEELING TIRED OR HAVING LITTLE ENERGY: SEVERAL DAYS
7. TROUBLE CONCENTRATING ON THINGS, SUCH AS READING THE NEWSPAPER OR WATCHING TELEVISION: SEVERAL DAYS
2. FEELING DOWN, DEPRESSED OR HOPELESS: NOT AT ALL
7. TROUBLE CONCENTRATING ON THINGS, SUCH AS READING THE NEWSPAPER OR WATCHING TELEVISION: SEVERAL DAYS
8. MOVING OR SPEAKING SO SLOWLY THAT OTHER PEOPLE COULD HAVE NOTICED. OR THE OPPOSITE - BEING SO FIDGETY OR RESTLESS THAT YOU HAVE BEEN MOVING AROUND A LOT MORE THAN USUAL: NOT AT ALL
8. MOVING OR SPEAKING SO SLOWLY THAT OTHER PEOPLE COULD HAVE NOTICED. OR THE OPPOSITE, BEING SO FIGETY OR RESTLESS THAT YOU HAVE BEEN MOVING AROUND A LOT MORE THAN USUAL: NOT AT ALL
SUM OF ALL RESPONSES TO PHQ QUESTIONS 1-9: 5
10. IF YOU CHECKED OFF ANY PROBLEMS, HOW DIFFICULT HAVE THESE PROBLEMS MADE IT FOR YOU TO DO YOUR WORK, TAKE CARE OF THINGS AT HOME, OR GET ALONG WITH OTHER PEOPLE: SOMEWHAT DIFFICULT

## 2025-01-15 ASSESSMENT — ENCOUNTER SYMPTOMS
SORE THROAT: 1
VOMITING: 1
WHEEZING: 1
ABDOMINAL PAIN: 0
NAUSEA: 1
COUGH: 1

## 2025-01-15 NOTE — PROGRESS NOTES
citalopram (CELEXA) 10 MG tablet TAKE 1 TABLET BY MOUTH DAILY 90 tablet 1    montelukast (SINGULAIR) 10 MG tablet TAKE ONE TABLET BY MOUTH ONCE NIGHTLY 90 tablet 3    EPINEPHrine (EPIPEN) 0.3 MG/0.3ML SOAJ injection INJECT INTO THE MIDDLE OF THE OUTER THIGH AND HOLD FOR THREE SECONDS AS NEEDED FOR SEVERE ALLERGIC REACTION THEN CALL 911 IF USED 2 each 1    desoximetasone (TOPICORT) 0.25 % cream Apply topically 2 times daily. 45 g 0    tacrolimus (PROTOPIC) 0.1 % ointment Apply topically 2 times daily. 15 g 0    albuterol sulfate  (90 Base) MCG/ACT inhaler INHALE TWO PUFFS INTO THE LUNGS FOUR TIMES A DAY AS NEEDED FOR WHEEZING 18 g 3    diphenhydrAMINE (BENADRYL) 25 MG tablet Take 2 tablets by mouth every 6 hours as needed for Itching      Cetirizine HCl 10 MG CAPS Take by mouth      Cholecalciferol (VITAMIN D3 PO) Take by mouth daily       No current facility-administered medications for this visit.     Allergies   Allergen Reactions    Latex Rash    Avocado Anaphylaxis    Banana Anaphylaxis    Amlodipine Swelling    Menthol     Menthol (Topical Analgesic) [Menthol (Topical Analgesic)] Other (See Comments)     Burning and rash    Pseudoephedrine     Sudafed [Pseudoephedrine Hcl]     Augmentin [Amoxicillin-Pot Clavulanate] Nausea And Vomiting    Tape [Adhesive Tape] Rash       Health Maintenance   Topic Date Due    HIV screen  Never done    HPV vaccine (2 - 3-dose series) 10/09/2009    Varicella vaccine (2 of 2 - 13+ 2-dose series) 08/24/2011    Flu vaccine (1) 08/01/2024    COVID-19 Vaccine (3 - 2023-24 season) 09/01/2024    Depression Monitoring  02/02/2025    Cervical cancer screen  08/30/2027    DTaP/Tdap/Td vaccine (2 - Td or Tdap) 12/30/2031    Hepatitis B vaccine  Completed    Hepatitis C screen  Completed    Hepatitis A vaccine  Aged Out    Hib vaccine  Aged Out    Polio vaccine  Aged Out    Meningococcal (ACWY) vaccine  Aged Out    Pneumococcal 0-64 years Vaccine  Aged Out    Lipids  Discontinued

## 2025-01-16 RX ORDER — EPINEPHRINE 0.3 MG/.3ML
INJECTION SUBCUTANEOUS
Qty: 2 EACH | Refills: 1 | Status: SHIPPED | OUTPATIENT
Start: 2025-01-16

## 2025-01-17 ENCOUNTER — OFFICE VISIT (OUTPATIENT)
Dept: PRIMARY CARE CLINIC | Age: 35
End: 2025-01-17

## 2025-01-17 VITALS
TEMPERATURE: 98.3 F | OXYGEN SATURATION: 100 % | HEIGHT: 63 IN | BODY MASS INDEX: 42.52 KG/M2 | DIASTOLIC BLOOD PRESSURE: 70 MMHG | SYSTOLIC BLOOD PRESSURE: 112 MMHG | HEART RATE: 80 BPM | WEIGHT: 240 LBS

## 2025-01-17 DIAGNOSIS — J10.1 INFLUENZA A: Primary | ICD-10-CM

## 2025-01-17 DIAGNOSIS — R50.9 FEVER, UNSPECIFIED FEVER CAUSE: ICD-10-CM

## 2025-01-17 DIAGNOSIS — J98.01 BRONCHOSPASM: ICD-10-CM

## 2025-01-17 DIAGNOSIS — R05.1 ACUTE COUGH: ICD-10-CM

## 2025-01-17 DIAGNOSIS — H66.001 ACUTE SUPPURATIVE OTITIS MEDIA OF RIGHT EAR WITHOUT SPONTANEOUS RUPTURE OF TYMPANIC MEMBRANE, RECURRENCE NOT SPECIFIED: ICD-10-CM

## 2025-01-17 LAB
INFLUENZA A ANTIGEN, POC: POSITIVE
INFLUENZA B ANTIGEN, POC: NEGATIVE
Lab: NORMAL
QC PASS/FAIL: NORMAL
SARS-COV-2 RDRP RESP QL NAA+PROBE: NEGATIVE
VALID INTERNAL CONTROL, POC: ABNORMAL

## 2025-01-17 RX ORDER — CODEINE PHOSPHATE AND GUAIFENESIN 10; 100 MG/5ML; MG/5ML
5 SOLUTION ORAL 3 TIMES DAILY PRN
Qty: 45 ML | Refills: 0 | Status: SHIPPED | OUTPATIENT
Start: 2025-01-17 | End: 2025-01-22

## 2025-01-17 RX ORDER — PROGESTERONE 200 MG/1
CAPSULE ORAL
COMMUNITY
Start: 2024-12-25

## 2025-01-17 RX ORDER — LETROZOLE 2.5 MG/1
TABLET, FILM COATED ORAL
COMMUNITY
Start: 2024-11-12

## 2025-01-17 RX ORDER — PREDNISONE 20 MG/1
20 TABLET ORAL 2 TIMES DAILY
Qty: 10 TABLET | Refills: 0 | Status: SHIPPED | OUTPATIENT
Start: 2025-01-17 | End: 2025-01-22

## 2025-01-17 RX ORDER — AZITHROMYCIN 250 MG/1
TABLET, FILM COATED ORAL
Qty: 1 PACKET | Refills: 0 | Status: SHIPPED | OUTPATIENT
Start: 2025-01-17 | End: 2025-01-27

## 2025-01-17 ASSESSMENT — ENCOUNTER SYMPTOMS
COUGH: 1
SHORTNESS OF BREATH: 1

## 2025-01-17 NOTE — PROGRESS NOTES
BY MOUTH DAILY 90 tablet 1    atenolol (TENORMIN) 25 MG tablet TAKE 1 TABLET BY MOUTH DAILY 90 tablet 1    famotidine (PEPCID) 40 MG tablet TAKE 1 TABLET BY MOUTH DAILY 90 tablet 1    citalopram (CELEXA) 10 MG tablet TAKE 1 TABLET BY MOUTH DAILY 90 tablet 1    montelukast (SINGULAIR) 10 MG tablet TAKE ONE TABLET BY MOUTH ONCE NIGHTLY 90 tablet 3    desoximetasone (TOPICORT) 0.25 % cream Apply topically 2 times daily. 45 g 0    tacrolimus (PROTOPIC) 0.1 % ointment Apply topically 2 times daily. 15 g 0    albuterol sulfate  (90 Base) MCG/ACT inhaler INHALE TWO PUFFS INTO THE LUNGS FOUR TIMES A DAY AS NEEDED FOR WHEEZING 18 g 3    diphenhydrAMINE (BENADRYL) 25 MG tablet Take 2 tablets by mouth every 6 hours as needed for Itching      Cetirizine HCl 10 MG CAPS Take by mouth      Cholecalciferol (VITAMIN D3 PO) Take by mouth daily       No current facility-administered medications for this visit.     Allergies   Allergen Reactions    Latex Rash    Avocado Anaphylaxis    Banana Anaphylaxis    Amlodipine Swelling    Menthol     Menthol (Topical Analgesic) [Menthol (Topical Analgesic)] Other (See Comments)     Burning and rash    Pseudoephedrine     Sudafed [Pseudoephedrine Hcl]     Augmentin [Amoxicillin-Pot Clavulanate] Nausea And Vomiting    Tape [Adhesive Tape] Rash       Subjective:      Review of Systems   Constitutional:  Positive for fatigue and fever.   Respiratory:  Positive for cough and shortness of breath.        Objective:     /70   Pulse 80   Temp 98.3 °F (36.8 °C)   Ht 1.6 m (5' 2.99\")   Wt 108.9 kg (240 lb)   LMP 01/13/2025 (Exact Date)   SpO2 100%   BMI 42.53 kg/m²   Physical Exam  Vitals and nursing note reviewed.   Constitutional:       Appearance: Normal appearance. She is ill-appearing.   HENT:      Right Ear: A middle ear effusion is present. Tympanic membrane is injected and bulging. Tympanic membrane is not perforated or erythematous.      Left Ear: A middle ear effusion is

## 2025-01-28 DIAGNOSIS — K21.9 GASTROESOPHAGEAL REFLUX DISEASE, UNSPECIFIED WHETHER ESOPHAGITIS PRESENT: ICD-10-CM

## 2025-01-29 RX ORDER — FAMOTIDINE 40 MG/1
TABLET, FILM COATED ORAL
Qty: 30 TABLET | Refills: 1 | Status: SHIPPED | OUTPATIENT
Start: 2025-01-29

## 2025-01-30 DIAGNOSIS — F33.8 SEASONAL AFFECTIVE DISORDER (HCC): ICD-10-CM

## 2025-01-30 DIAGNOSIS — F33.0 MILD EPISODE OF RECURRENT MAJOR DEPRESSIVE DISORDER (HCC): ICD-10-CM

## 2025-01-30 DIAGNOSIS — F41.9 ANXIETY: ICD-10-CM

## 2025-01-30 RX ORDER — CITALOPRAM HYDROBROMIDE 10 MG/1
10 TABLET ORAL DAILY
Qty: 90 TABLET | Refills: 0 | Status: SHIPPED | OUTPATIENT
Start: 2025-01-30 | End: 2025-07-29

## 2025-02-06 ENCOUNTER — OFFICE VISIT (OUTPATIENT)
Dept: PRIMARY CARE CLINIC | Age: 35
End: 2025-02-06
Payer: COMMERCIAL

## 2025-02-06 VITALS
OXYGEN SATURATION: 97 % | HEIGHT: 62 IN | HEART RATE: 87 BPM | SYSTOLIC BLOOD PRESSURE: 120 MMHG | WEIGHT: 247 LBS | BODY MASS INDEX: 45.45 KG/M2 | DIASTOLIC BLOOD PRESSURE: 60 MMHG

## 2025-02-06 DIAGNOSIS — I10 ESSENTIAL HYPERTENSION: Primary | ICD-10-CM

## 2025-02-06 DIAGNOSIS — H66.90 ACUTE OTITIS MEDIA, UNSPECIFIED OTITIS MEDIA TYPE: ICD-10-CM

## 2025-02-06 DIAGNOSIS — K76.0 NONALCOHOLIC FATTY LIVER: ICD-10-CM

## 2025-02-06 DIAGNOSIS — E78.2 MIXED HYPERLIPIDEMIA: ICD-10-CM

## 2025-02-06 DIAGNOSIS — F33.8 SEASONAL AFFECTIVE DISORDER (HCC): ICD-10-CM

## 2025-02-06 DIAGNOSIS — J45.20 MILD INTERMITTENT ASTHMA, UNSPECIFIED WHETHER COMPLICATED: ICD-10-CM

## 2025-02-06 DIAGNOSIS — H69.93 DYSFUNCTION OF BOTH EUSTACHIAN TUBES: ICD-10-CM

## 2025-02-06 DIAGNOSIS — K21.9 GASTROESOPHAGEAL REFLUX DISEASE WITHOUT ESOPHAGITIS: ICD-10-CM

## 2025-02-06 PROCEDURE — 3074F SYST BP LT 130 MM HG: CPT | Performed by: PHYSICIAN ASSISTANT

## 2025-02-06 PROCEDURE — 3078F DIAST BP <80 MM HG: CPT | Performed by: PHYSICIAN ASSISTANT

## 2025-02-06 PROCEDURE — 99214 OFFICE O/P EST MOD 30 MIN: CPT | Performed by: PHYSICIAN ASSISTANT

## 2025-02-06 PROCEDURE — G2211 COMPLEX E/M VISIT ADD ON: HCPCS | Performed by: PHYSICIAN ASSISTANT

## 2025-02-06 RX ORDER — FLUTICASONE PROPIONATE 50 MCG
2 SPRAY, SUSPENSION (ML) NASAL DAILY
Qty: 16 G | Refills: 0 | Status: SHIPPED | OUTPATIENT
Start: 2025-02-06

## 2025-02-06 RX ORDER — NEOMYCIN SULFATE, POLYMYXIN B SULFATE, HYDROCORTISONE 3.5; 10000; 1 MG/ML; [USP'U]/ML; MG/ML
4 SOLUTION/ DROPS AURICULAR (OTIC) 3 TIMES DAILY
Qty: 10 ML | Refills: 0 | Status: SHIPPED | OUTPATIENT
Start: 2025-02-06 | End: 2025-02-23

## 2025-02-06 ASSESSMENT — ENCOUNTER SYMPTOMS
SORE THROAT: 0
COUGH: 1
ABDOMINAL DISTENTION: 0
CHEST TIGHTNESS: 0
CONSTIPATION: 0
SHORTNESS OF BREATH: 0
ABDOMINAL PAIN: 0
DIARRHEA: 0

## 2025-02-06 ASSESSMENT — PATIENT HEALTH QUESTIONNAIRE - PHQ9
SUM OF ALL RESPONSES TO PHQ QUESTIONS 1-9: 0
10. IF YOU CHECKED OFF ANY PROBLEMS, HOW DIFFICULT HAVE THESE PROBLEMS MADE IT FOR YOU TO DO YOUR WORK, TAKE CARE OF THINGS AT HOME, OR GET ALONG WITH OTHER PEOPLE: NOT DIFFICULT AT ALL
DEPRESSION UNABLE TO ASSESS: FUNCTIONAL CAPACITY MOTIVATION LIMITS ACCURACY
8. MOVING OR SPEAKING SO SLOWLY THAT OTHER PEOPLE COULD HAVE NOTICED. OR THE OPPOSITE, BEING SO FIGETY OR RESTLESS THAT YOU HAVE BEEN MOVING AROUND A LOT MORE THAN USUAL: NOT AT ALL
2. FEELING DOWN, DEPRESSED OR HOPELESS: NOT AT ALL
7. TROUBLE CONCENTRATING ON THINGS, SUCH AS READING THE NEWSPAPER OR WATCHING TELEVISION: NOT AT ALL
1. LITTLE INTEREST OR PLEASURE IN DOING THINGS: NOT AT ALL
6. FEELING BAD ABOUT YOURSELF - OR THAT YOU ARE A FAILURE OR HAVE LET YOURSELF OR YOUR FAMILY DOWN: NOT AT ALL
9. THOUGHTS THAT YOU WOULD BE BETTER OFF DEAD, OR OF HURTING YOURSELF: NOT AT ALL
4. FEELING TIRED OR HAVING LITTLE ENERGY: NOT AT ALL
SUM OF ALL RESPONSES TO PHQ QUESTIONS 1-9: 0
SUM OF ALL RESPONSES TO PHQ9 QUESTIONS 1 & 2: 0
SUM OF ALL RESPONSES TO PHQ QUESTIONS 1-9: 0
5. POOR APPETITE OR OVEREATING: NOT AT ALL
3. TROUBLE FALLING OR STAYING ASLEEP: NOT AT ALL
SUM OF ALL RESPONSES TO PHQ QUESTIONS 1-9: 0

## 2025-02-06 NOTE — PROGRESS NOTES
vaccine (2 - Td or Tdap) 12/30/2031   • Hepatitis B vaccine  Completed   • Hepatitis C screen  Completed   • Hepatitis A vaccine  Aged Out   • Hib vaccine  Aged Out   • Polio vaccine  Aged Out   • Meningococcal (ACWY) vaccine  Aged Out   • Lipids  Discontinued       Subjective:      Review of Systems   Constitutional:  Negative for chills, fever and unexpected weight change.   HENT:  Positive for ear pain. Negative for congestion and sore throat.    Respiratory:  Positive for cough. Negative for chest tightness and shortness of breath.    Cardiovascular:  Negative for chest pain, palpitations and leg swelling.   Gastrointestinal:  Negative for abdominal distention, abdominal pain, constipation and diarrhea.   Genitourinary:  Negative for difficulty urinating and hematuria.   Musculoskeletal:  Negative for arthralgias.   Skin:  Negative for rash.   Neurological:  Negative for dizziness, weakness, numbness and headaches.   Psychiatric/Behavioral:  Negative for dysphoric mood and sleep disturbance. The patient is not nervous/anxious.        Objective:     /60   Pulse 87   Ht 1.575 m (5' 2\")   Wt 112 kg (247 lb)   LMP 01/13/2025 (Exact Date)   SpO2 97%   BMI 45.18 kg/m²   Physical Exam  Constitutional:       General: She is not in acute distress.     Appearance: Normal appearance. She is not ill-appearing.   HENT:      Head: Normocephalic and atraumatic.      Right Ear: External ear normal.      Left Ear: External ear normal.      Nose: Nose normal.      Mouth/Throat:      Mouth: Mucous membranes are moist.   Neck:      Vascular: No carotid bruit.   Cardiovascular:      Rate and Rhythm: Normal rate and regular rhythm.      Pulses: Normal pulses.      Heart sounds: Normal heart sounds.   Pulmonary:      Effort: Pulmonary effort is normal. No respiratory distress.      Breath sounds: Normal breath sounds.   Musculoskeletal:         General: Normal range of motion.      Cervical back: Normal range of motion and

## 2025-03-10 DIAGNOSIS — K21.9 GASTROESOPHAGEAL REFLUX DISEASE, UNSPECIFIED WHETHER ESOPHAGITIS PRESENT: ICD-10-CM

## 2025-03-10 RX ORDER — FAMOTIDINE 40 MG/1
40 TABLET, FILM COATED ORAL DAILY
Qty: 90 TABLET | Refills: 1 | Status: SHIPPED | OUTPATIENT
Start: 2025-03-10

## 2025-03-10 RX ORDER — ATENOLOL 25 MG/1
25 TABLET ORAL DAILY
Qty: 90 TABLET | Refills: 1 | Status: SHIPPED | OUTPATIENT
Start: 2025-03-10 | End: 2025-03-13 | Stop reason: ALTCHOICE

## 2025-03-12 ASSESSMENT — ENCOUNTER SYMPTOMS
ABDOMINAL PAIN: 0
APNEA: 0
CHEST TIGHTNESS: 0
COLOR CHANGE: 0
COUGH: 0
SHORTNESS OF BREATH: 0

## 2025-03-13 ENCOUNTER — OFFICE VISIT (OUTPATIENT)
Dept: PRIMARY CARE CLINIC | Age: 35
End: 2025-03-13
Payer: COMMERCIAL

## 2025-03-13 VITALS
SYSTOLIC BLOOD PRESSURE: 116 MMHG | HEIGHT: 62 IN | HEART RATE: 91 BPM | DIASTOLIC BLOOD PRESSURE: 72 MMHG | WEIGHT: 248.4 LBS | BODY MASS INDEX: 45.71 KG/M2 | OXYGEN SATURATION: 99 %

## 2025-03-13 DIAGNOSIS — Z3A.01 LESS THAN 8 WEEKS GESTATION OF PREGNANCY: ICD-10-CM

## 2025-03-13 DIAGNOSIS — E88.819 INSULIN RESISTANCE: ICD-10-CM

## 2025-03-13 DIAGNOSIS — I10 ESSENTIAL HYPERTENSION: Primary | ICD-10-CM

## 2025-03-13 PROBLEM — H93.8X2 MASS OF LEFT EAR: Status: RESOLVED | Noted: 2022-02-08 | Resolved: 2025-03-13

## 2025-03-13 PROCEDURE — 3078F DIAST BP <80 MM HG: CPT | Performed by: PHYSICIAN ASSISTANT

## 2025-03-13 PROCEDURE — 3074F SYST BP LT 130 MM HG: CPT | Performed by: PHYSICIAN ASSISTANT

## 2025-03-13 PROCEDURE — 99214 OFFICE O/P EST MOD 30 MIN: CPT | Performed by: PHYSICIAN ASSISTANT

## 2025-03-13 RX ORDER — LABETALOL 100 MG/1
50 TABLET, FILM COATED ORAL EVERY 12 HOURS SCHEDULED
Status: CANCELLED | OUTPATIENT
Start: 2025-03-13

## 2025-03-13 ASSESSMENT — ENCOUNTER SYMPTOMS: NAUSEA: 1

## 2025-03-13 NOTE — PROGRESS NOTES
[facility]  [facility]  [id number]  [facility] [address]  Dept: [phone]    Bob Pete is a 35 y.o. female who presents today for her medical conditions/complaints as noted below.      Chief Complaint   Patient presents with    Hypertension     Patient states she needs to change her Atenolol due to being pregnant and the medication being known for causing low birth weight.        HPI:     HPI  Still on Atenolol  today. Will need change now that she is pregnant  Was doing IUI and became pregnant.  Fertility/OB  will be starting new med tomorrow. None of there fertility meds are stopped yet.   No components found for: \"LDLCHOLESTEROL\", \"LDLCALC\"    (goal LDL is <100)   AST (U/L)   Date Value   02/02/2024 28     ALT (U/L)   Date Value   02/02/2024 38 (H)     BUN (mg/dL)   Date Value   02/02/2024 12     BP Readings from Last 3 Encounters:   03/13/25 116/72   02/06/25 120/60   01/17/25 112/70          (goal 120/80)    Past Medical History:   Diagnosis Date    ADD (attention deficit disorder) without hyperactivity 02/21/2024    Allergic rhinitis     Asthma     Calculus of kidney 01/12/2023    Chronic bilateral low back pain without sciatica 04/26/2021    Class 3 severe obesity with serious comorbidity in adult 07/13/2021    Depression with anxiety 06/08/2021    Gastroparesis     GERD (gastroesophageal reflux disease)     Headache     Hepatic adenoma     has referral to [facility]    Hyperlipidemia     Hypertension     Insulin resistance     Irritable bowel syndrome     Nonalcoholic fatty liver 06/29/2023    Scoliosis     has an S curve     Seasonal affective disorder 03/02/2021    Wears glasses       Past Surgical History:   Procedure Laterality Date    ADENOIDECTOMY      BRONCHOSCOPY      CHOLECYSTECTOMY  02/01/2013    COLONOSCOPY      COLPOSCOPY  10/04/2021    SHOULDER SURGERY Left     bone spur    TONSILLECTOMY      TYMPANOSTOMY TUBE PLACEMENT      UPPER

## 2025-03-15 ENCOUNTER — PATIENT MESSAGE (OUTPATIENT)
Dept: PRIMARY CARE CLINIC | Age: 35
End: 2025-03-15

## 2025-03-17 ENCOUNTER — HOSPITAL ENCOUNTER (OUTPATIENT)
Facility: CLINIC | Age: 35
Discharge: HOME | End: 2025-03-17
Payer: COMMERCIAL

## 2025-03-17 LAB
B-HCG SERPL EIA 3RD IS-ACNC: 49.5 MIU/ML
PROGEST SERPL-MCNC: 13.6 NG/ML

## 2025-03-17 PROCEDURE — 84702 CHORIONIC GONADOTROPIN TEST: CPT

## 2025-03-17 PROCEDURE — 36415 COLL VENOUS BLD VENIPUNCTURE: CPT

## 2025-03-17 PROCEDURE — 84144 ASSAY OF PROGESTERONE: CPT

## 2025-03-17 RX ORDER — LABETALOL 100 MG/1
100 TABLET, FILM COATED ORAL 2 TIMES DAILY
COMMUNITY
Start: 2025-03-15

## 2025-04-02 DIAGNOSIS — J45.20 MILD INTERMITTENT REACTIVE AIRWAY DISEASE WITHOUT COMPLICATION: ICD-10-CM

## 2025-04-03 RX ORDER — MONTELUKAST SODIUM 10 MG/1
TABLET ORAL
Qty: 90 TABLET | Refills: 3 | Status: SHIPPED | OUTPATIENT
Start: 2025-04-03

## 2025-04-03 RX ORDER — MONTELUKAST SODIUM 10 MG/1
TABLET ORAL
Qty: 90 TABLET | Refills: 3 | OUTPATIENT
Start: 2025-04-03

## 2025-04-09 ENCOUNTER — HOSPITAL ENCOUNTER (OUTPATIENT)
Facility: CLINIC | Age: 35
Discharge: HOME | End: 2025-04-09
Payer: COMMERCIAL

## 2025-04-09 LAB — PROGEST SERPL-MCNC: 25.8 NG/ML

## 2025-04-09 PROCEDURE — 36415 COLL VENOUS BLD VENIPUNCTURE: CPT

## 2025-04-09 PROCEDURE — 84144 ASSAY OF PROGESTERONE: CPT

## 2025-04-10 DIAGNOSIS — K31.7 GASTRIC POLYPS: ICD-10-CM

## 2025-04-11 RX ORDER — PANTOPRAZOLE SODIUM 40 MG/1
40 TABLET, DELAYED RELEASE ORAL DAILY
Qty: 90 TABLET | Refills: 1 | Status: SHIPPED | OUTPATIENT
Start: 2025-04-11

## 2025-05-02 ENCOUNTER — HOSPITAL ENCOUNTER (EMERGENCY)
Facility: CLINIC | Age: 35
Discharge: HOME | End: 2025-05-03
Attending: STUDENT IN AN ORGANIZED HEALTH CARE EDUCATION/TRAINING PROGRAM
Payer: COMMERCIAL

## 2025-05-02 DIAGNOSIS — R07.9 CHEST PAIN, UNSPECIFIED TYPE: Primary | ICD-10-CM

## 2025-05-02 LAB
ANION GAP SERPL CALCULATED.3IONS-SCNC: 10 MMOL/L (ref 9–16)
BASOPHILS # BLD: 0 K/UL (ref 0–0.2)
BASOPHILS NFR BLD: 1 % (ref 0–2)
BUN SERPL-MCNC: 16 MG/DL (ref 6–20)
CALCIUM SERPL-MCNC: 9.7 MG/DL (ref 8.6–10.4)
CHLORIDE SERPL-SCNC: 104 MMOL/L (ref 98–107)
CO2 SERPL-SCNC: 26 MMOL/L (ref 20–31)
CREAT SERPL-MCNC: 0.9 MG/DL (ref 0.5–0.9)
D DIMER PPP FEU-MCNC: 0.21 UG/ML FEU
EOSINOPHIL # BLD: 0.1 K/UL (ref 0–0.4)
EOSINOPHILS RELATIVE PERCENT: 1 % (ref 1–4)
ERYTHROCYTE [DISTWIDTH] IN BLOOD BY AUTOMATED COUNT: 13.1 % (ref 12.5–15.4)
GFR, ESTIMATED: 87 ML/MIN/1.73M2
GLUCOSE SERPL-MCNC: 108 MG/DL (ref 74–99)
HCT VFR BLD AUTO: 39.2 % (ref 36–46)
HGB BLD-MCNC: 13.7 G/DL (ref 12–16)
LYMPHOCYTES NFR BLD: 2.7 K/UL (ref 1–4.8)
LYMPHOCYTES RELATIVE PERCENT: 35 % (ref 24–44)
MAGNESIUM SERPL-MCNC: 1.9 MG/DL (ref 1.6–2.6)
MCH RBC QN AUTO: 33.1 PG (ref 26–34)
MCHC RBC AUTO-ENTMCNC: 34.9 G/DL (ref 31–37)
MCV RBC AUTO: 94.8 FL (ref 80–100)
MONOCYTES NFR BLD: 0.7 K/UL (ref 0.1–1.2)
MONOCYTES NFR BLD: 10 % (ref 2–11)
NEUTROPHILS NFR BLD: 53 % (ref 36–66)
NEUTS SEG NFR BLD: 4.1 K/UL (ref 1.8–7.7)
PLATELET # BLD AUTO: 327 K/UL (ref 140–450)
PMV BLD AUTO: 7.9 FL (ref 6–12)
POTASSIUM SERPL-SCNC: 3.7 MMOL/L (ref 3.7–5.3)
RBC # BLD AUTO: 4.13 M/UL (ref 4–5.2)
SODIUM SERPL-SCNC: 140 MMOL/L (ref 136–145)
TROPONIN I SERPL HS-MCNC: <6 NG/L (ref 0–14)
WBC OTHER # BLD: 7.7 K/UL (ref 3.5–11)

## 2025-05-02 PROCEDURE — 36415 COLL VENOUS BLD VENIPUNCTURE: CPT

## 2025-05-02 PROCEDURE — 83735 ASSAY OF MAGNESIUM: CPT

## 2025-05-02 PROCEDURE — 85025 COMPLETE CBC W/AUTO DIFF WBC: CPT

## 2025-05-02 PROCEDURE — 84484 ASSAY OF TROPONIN QUANT: CPT

## 2025-05-02 PROCEDURE — 85379 FIBRIN DEGRADATION QUANT: CPT

## 2025-05-02 PROCEDURE — 6370000000 HC RX 637 (ALT 250 FOR IP): Performed by: STUDENT IN AN ORGANIZED HEALTH CARE EDUCATION/TRAINING PROGRAM

## 2025-05-02 PROCEDURE — 93005 ELECTROCARDIOGRAM TRACING: CPT | Performed by: FAMILY MEDICINE

## 2025-05-02 PROCEDURE — 99285 EMERGENCY DEPT VISIT HI MDM: CPT

## 2025-05-02 PROCEDURE — 80048 BASIC METABOLIC PNL TOTAL CA: CPT

## 2025-05-02 RX ORDER — MORPHINE SULFATE 4 MG/ML
4 INJECTION, SOLUTION INTRAMUSCULAR; INTRAVENOUS ONCE
Refills: 0 | Status: DISCONTINUED | OUTPATIENT
Start: 2025-05-03 | End: 2025-05-03

## 2025-05-02 RX ORDER — ONDANSETRON 2 MG/ML
4 INJECTION INTRAMUSCULAR; INTRAVENOUS ONCE
Status: DISCONTINUED | OUTPATIENT
Start: 2025-05-03 | End: 2025-05-03

## 2025-05-02 RX ORDER — ASPIRIN 81 MG/1
324 TABLET, CHEWABLE ORAL ONCE
Status: COMPLETED | OUTPATIENT
Start: 2025-05-03 | End: 2025-05-02

## 2025-05-02 RX ADMIN — ASPIRIN 324 MG: 81 TABLET, CHEWABLE ORAL at 23:48

## 2025-05-02 ASSESSMENT — PAIN DESCRIPTION - ORIENTATION: ORIENTATION: LEFT

## 2025-05-02 ASSESSMENT — PAIN SCALES - GENERAL: PAINLEVEL_OUTOF10: 6

## 2025-05-02 ASSESSMENT — PAIN - FUNCTIONAL ASSESSMENT: PAIN_FUNCTIONAL_ASSESSMENT: 0-10

## 2025-05-02 ASSESSMENT — PAIN DESCRIPTION - LOCATION: LOCATION: CHEST

## 2025-05-03 ENCOUNTER — APPOINTMENT (OUTPATIENT)
Dept: GENERAL RADIOLOGY | Facility: CLINIC | Age: 35
End: 2025-05-03
Payer: COMMERCIAL

## 2025-05-03 VITALS
RESPIRATION RATE: 21 BRPM | HEART RATE: 89 BPM | OXYGEN SATURATION: 98 % | BODY MASS INDEX: 42.52 KG/M2 | HEIGHT: 63 IN | TEMPERATURE: 98.7 F | SYSTOLIC BLOOD PRESSURE: 104 MMHG | DIASTOLIC BLOOD PRESSURE: 66 MMHG | WEIGHT: 240 LBS

## 2025-05-03 LAB
EKG ATRIAL RATE: 92 BPM
EKG P AXIS: 48 DEGREES
EKG P-R INTERVAL: 142 MS
EKG Q-T INTERVAL: 374 MS
EKG QRS DURATION: 86 MS
EKG QTC CALCULATION (BAZETT): 462 MS
EKG R AXIS: 39 DEGREES
EKG T AXIS: 61 DEGREES
EKG VENTRICULAR RATE: 92 BPM
TROPONIN I SERPL HS-MCNC: <6 NG/L (ref 0–14)

## 2025-05-03 PROCEDURE — 71045 X-RAY EXAM CHEST 1 VIEW: CPT

## 2025-05-03 PROCEDURE — 84484 ASSAY OF TROPONIN QUANT: CPT

## 2025-05-03 PROCEDURE — 6370000000 HC RX 637 (ALT 250 FOR IP): Performed by: STUDENT IN AN ORGANIZED HEALTH CARE EDUCATION/TRAINING PROGRAM

## 2025-05-03 RX ORDER — ONDANSETRON 4 MG/1
4 TABLET, ORALLY DISINTEGRATING ORAL ONCE
Status: COMPLETED | OUTPATIENT
Start: 2025-05-03 | End: 2025-05-03

## 2025-05-03 RX ORDER — OXYCODONE HYDROCHLORIDE 5 MG/1
5 TABLET ORAL ONCE
Refills: 0 | Status: COMPLETED | OUTPATIENT
Start: 2025-05-03 | End: 2025-05-03

## 2025-05-03 RX ADMIN — OXYCODONE HYDROCHLORIDE 5 MG: 5 TABLET ORAL at 00:04

## 2025-05-03 RX ADMIN — ONDANSETRON 4 MG: 4 TABLET, ORALLY DISINTEGRATING ORAL at 00:04

## 2025-05-03 ASSESSMENT — HEART SCORE: ECG: NON-SPECIFC REPOLARIZATION DISTURBANCE/LBTB/PM

## 2025-05-03 NOTE — DISCHARGE INSTRUCTIONS
SUMMARY OF YOUR VISIT    Today you were seen for chest pain.  We discussed your labs and imaging.  I recommend you follow-up with your primary care provider.  I recommend you follow-up with your fertility doctor.  If you have any new, changing, worsening, no improve or develop additional symptoms or concerns I recommend return to the emergency department for reevaluation.    Please continue to take your home medication as previously prescribed, I have made no changes to your home medications.        You can return to our or another Emergency Department as needed or for worsening symptoms of chest pain, shortness of breath, high fevers not relieved by acetaminophen (Tylenol) and/or ibuprofen (Motrin / Advil), chills, feeling of your heart fluttering or racing, persistent nausea and/or vomiting, vomiting up blood, blood in your stool, loss of consciousness, numbness, weakness or tingling in the arms or legs or change in color of the extremities, changes in mental status, persistent headache, blurry vision, loss of bladder / bowel control, if you are unable to follow up with your physician, or other any other care or concern.    Thank You!    On behalf of the Emergency Department staff and team, I would like to thank you for allowing us the opportunity to participate in your health care and evaluation today.

## 2025-05-03 NOTE — ED PROVIDER NOTES
mouth Daily    Jo Evans MD   metFORMIN (GLUCOPHAGE-XR) 500 MG extended release tablet Take 750 mg by mouth daily (with breakfast)    Jo Evans MD   desoximetasone (TOPICORT) 0.25 % cream Apply topically 2 times daily. 1/12/23   Sanjana Mao PA-C   tacrolimus (PROTOPIC) 0.1 % ointment Apply topically 2 times daily. 1/12/23   Sanjana Mao PA-C   albuterol sulfate  (90 Base) MCG/ACT inhaler INHALE TWO PUFFS INTO THE LUNGS FOUR TIMES A DAY AS NEEDED FOR WHEEZING 4/22/22   Sanjana Mao PA-C   diphenhydrAMINE (BENADRYL) 25 MG tablet Take 2 tablets by mouth every 6 hours as needed for Itching    Jo Evans MD   Cetirizine HCl 10 MG CAPS Take by mouth    Jo Evans MD   Cholecalciferol (VITAMIN D3 PO) Take by mouth daily    Jo Evans MD         REVIEW OF SYSTEMS        Pertinent systems reviewed and negative with exception of those noted in HPI.     PHYSICAL EXAM      INITIAL VITALS:   /66   Pulse 89   Temp 98.7 °F (37.1 °C) (Oral)   Resp 21   Ht 1.6 m (5' 3\")   Wt 108.9 kg (240 lb)   LMP 04/20/2025 (Exact Date)   SpO2 98%   BMI 42.51 kg/m²     PHYSICAL EXAM:    Constitutional: Awake, alert, answering questions appropriately, non-toxic, non-lethargic    HEENT: Head is atraumatic, conjunctiva non-injected, normal nose present    Neck/Back: Moving neck freely on my examination, no meningeal signs present    Chest: inspection is normal, no outward signs of trauma, no crepitous on palpation, tenderness to palpation of the left anterior chest there are no skin changes noted over the left anterior chest.  No ecchymosis.  No edema.    Cardiovascular: Heart rate is nontachycardic, no pretibial edema    Respiratory: Breath sounds are present bilaterally, even unlabored breathing, no acute respiratory distress, no supplemental oxygen support in place no wheezes no rhonchi no rales no focal area of diminished breath  have answered their questions and given discharge instructions. They voiced understanding of these instructions and did not have any further questions or complaints.    FINAL IMPRESSION      1. Chest pain, unspecified type          DISPOSITION / PLAN     DISPOSITION Decision To Discharge 05/03/2025 02:44:10 AM   DISPOSITION CONDITION Stable           PATIENT REFERRED TO:  Sanjana Mao PA-C  [address] [address] [address] [phone]    Call   For Post Emergency Department Follow Up    [facility] Emergency Department  [address] [address]  [phone]    As needed, If symptoms worsen      DISCHARGE MEDICATIONS:  New Prescriptions    No medications on file       Juana Cadet DO  Emergency Medicine Physician    (Please note that portions of this note were completed with a voice recognition program.  Efforts were made to edit the dictations but occasionally words are mis-transcribed.)                      Juana Cadet DO  05/03/25 [id number]

## 2025-05-05 LAB
EKG ATRIAL RATE: 92 BPM
EKG P AXIS: 48 DEGREES
EKG P-R INTERVAL: 142 MS
EKG Q-T INTERVAL: 374 MS
EKG QRS DURATION: 86 MS
EKG QTC CALCULATION (BAZETT): 462 MS
EKG R AXIS: 39 DEGREES
EKG T AXIS: 61 DEGREES
EKG VENTRICULAR RATE: 92 BPM

## 2025-05-07 ENCOUNTER — PATIENT MESSAGE (OUTPATIENT)
Dept: PRIMARY CARE CLINIC | Age: 35
End: 2025-05-07

## 2025-05-13 DIAGNOSIS — F33.8 SEASONAL AFFECTIVE DISORDER: ICD-10-CM

## 2025-05-13 DIAGNOSIS — F41.9 ANXIETY: ICD-10-CM

## 2025-05-13 DIAGNOSIS — F33.0 MILD EPISODE OF RECURRENT MAJOR DEPRESSIVE DISORDER: ICD-10-CM

## 2025-05-13 RX ORDER — CITALOPRAM HYDROBROMIDE 10 MG/1
10 TABLET ORAL DAILY
Qty: 90 TABLET | Refills: 0 | Status: SHIPPED | OUTPATIENT
Start: 2025-05-13 | End: 2025-11-09

## 2025-05-14 ENCOUNTER — HOSPITAL ENCOUNTER (OUTPATIENT)
Facility: CLINIC | Age: 35
Discharge: HOME | End: 2025-05-14
Payer: COMMERCIAL

## 2025-05-14 PROCEDURE — 36415 COLL VENOUS BLD VENIPUNCTURE: CPT

## 2025-05-14 PROCEDURE — 84144 ASSAY OF PROGESTERONE: CPT

## 2025-05-15 LAB — PROGEST SERPL-MCNC: 6.49 NG/ML

## 2025-06-18 NOTE — PROGRESS NOTES
[facility]  [facility]  [address]  [facility] [address]  Dept: [phone]    Bob Pete is a 35 y.o. female who presents today for her medical conditions/complaints as noted below.      Chief Complaint   Patient presents with    Results     Patient would like to discuss the results from her EKG in May.       HPI:     HPI  EKG done in May 2025 shows new non specific T wave abnormality in anterior leads and a prolonged QT. She was in the ER early May with left sided chest pain that radiated into her left shoulder. It was reproducible pain worse with movement as reported in ER notes. She states the pain/pressure in left upper chest with deep breathing and palpitation. The chest pain has resolved.     She does take benedryl (occ with Epi pen )  and citalopram to cause prolonged QT. She needs       No components found for: \"LDLCHOLESTEROL\", \"LDLCALC\"    (goal LDL is <100)   AST (U/L)   Date Value   02/02/2024 28     ALT (U/L)   Date Value   02/02/2024 38 (H)     BUN (mg/dL)   Date Value   05/02/2025 16     BP Readings from Last 3 Encounters:   06/19/25 130/76   05/03/25 104/66   03/13/25 116/72          (goal 120/80)    Past Medical History:   Diagnosis Date    ADD (attention deficit disorder) without hyperactivity 02/21/2024    Allergic rhinitis     Asthma     Calculus of kidney 01/12/2023    Chronic bilateral low back pain without sciatica 04/26/2021    Class 3 severe obesity with serious comorbidity in adult (HCC) 07/13/2021    Depression with anxiety 06/08/2021    Gastroparesis     GERD (gastroesophageal reflux disease)     Headache     Hepatic adenoma     has referral to [facility]    Hyperlipidemia     Hypertension     Insulin resistance     Irritable bowel syndrome     Nonalcoholic fatty liver 06/29/2023    Scoliosis     has an S curve     Seasonal affective disorder 03/02/2021    Wears glasses       Past Surgical History:   Procedure Laterality Date

## 2025-06-19 ENCOUNTER — OFFICE VISIT (OUTPATIENT)
Dept: PRIMARY CARE CLINIC | Age: 35
End: 2025-06-19
Payer: COMMERCIAL

## 2025-06-19 VITALS
BODY MASS INDEX: 43.02 KG/M2 | SYSTOLIC BLOOD PRESSURE: 130 MMHG | HEIGHT: 63 IN | OXYGEN SATURATION: 95 % | DIASTOLIC BLOOD PRESSURE: 76 MMHG | WEIGHT: 242.8 LBS

## 2025-06-19 DIAGNOSIS — E66.813 CLASS 3 SEVERE OBESITY WITH SERIOUS COMORBIDITY AND BODY MASS INDEX (BMI) OF 40.0 TO 44.9 IN ADULT, UNSPECIFIED OBESITY TYPE (HCC): ICD-10-CM

## 2025-06-19 DIAGNOSIS — E28.2 POLYCYSTIC OVARY SYNDROME: ICD-10-CM

## 2025-06-19 DIAGNOSIS — I10 ESSENTIAL HYPERTENSION: Primary | ICD-10-CM

## 2025-06-19 DIAGNOSIS — F41.9 ANXIETY: ICD-10-CM

## 2025-06-19 DIAGNOSIS — Z00.00 HEALTH CARE MAINTENANCE: ICD-10-CM

## 2025-06-19 DIAGNOSIS — K21.9 GASTROESOPHAGEAL REFLUX DISEASE, UNSPECIFIED WHETHER ESOPHAGITIS PRESENT: ICD-10-CM

## 2025-06-19 DIAGNOSIS — K76.0 NONALCOHOLIC FATTY LIVER: ICD-10-CM

## 2025-06-19 DIAGNOSIS — K31.7 GASTRIC POLYPS: ICD-10-CM

## 2025-06-19 DIAGNOSIS — F33.8 SEASONAL AFFECTIVE DISORDER: ICD-10-CM

## 2025-06-19 DIAGNOSIS — N97.0 FEMALE INFERTILITY ASSOCIATED WITH ANOVULATION: ICD-10-CM

## 2025-06-19 DIAGNOSIS — F33.0 MILD EPISODE OF RECURRENT MAJOR DEPRESSIVE DISORDER: ICD-10-CM

## 2025-06-19 DIAGNOSIS — E88.810 METABOLIC SYNDROME: ICD-10-CM

## 2025-06-19 DIAGNOSIS — J45.20 MILD INTERMITTENT REACTIVE AIRWAY DISEASE WITHOUT COMPLICATION: ICD-10-CM

## 2025-06-19 DIAGNOSIS — E78.2 MIXED HYPERLIPIDEMIA: ICD-10-CM

## 2025-06-19 DIAGNOSIS — E88.819 INSULIN RESISTANCE: ICD-10-CM

## 2025-06-19 PROCEDURE — 3075F SYST BP GE 130 - 139MM HG: CPT | Performed by: PHYSICIAN ASSISTANT

## 2025-06-19 PROCEDURE — 99214 OFFICE O/P EST MOD 30 MIN: CPT | Performed by: PHYSICIAN ASSISTANT

## 2025-06-19 PROCEDURE — 3078F DIAST BP <80 MM HG: CPT | Performed by: PHYSICIAN ASSISTANT

## 2025-06-19 RX ORDER — CITALOPRAM HYDROBROMIDE 10 MG/1
10 TABLET ORAL DAILY
Qty: 90 TABLET | Refills: 1 | Status: SHIPPED | OUTPATIENT
Start: 2025-06-19 | End: 2025-12-16

## 2025-06-19 RX ORDER — PIOGLITAZONE 30 MG/1
30 TABLET ORAL DAILY
COMMUNITY

## 2025-06-19 RX ORDER — MONTELUKAST SODIUM 10 MG/1
TABLET ORAL
Qty: 90 TABLET | Refills: 3 | Status: SHIPPED | OUTPATIENT
Start: 2025-06-19

## 2025-06-19 RX ORDER — FAMOTIDINE 40 MG/1
40 TABLET, FILM COATED ORAL DAILY
Qty: 90 TABLET | Refills: 2 | Status: SHIPPED | OUTPATIENT
Start: 2025-06-19

## 2025-06-19 RX ORDER — PANTOPRAZOLE SODIUM 40 MG/1
40 TABLET, DELAYED RELEASE ORAL DAILY
Qty: 90 TABLET | Refills: 2 | Status: SHIPPED | OUTPATIENT
Start: 2025-06-19

## 2025-06-19 ASSESSMENT — PATIENT HEALTH QUESTIONNAIRE - PHQ9
SUM OF ALL RESPONSES TO PHQ QUESTIONS 1-9: 0
2. FEELING DOWN, DEPRESSED OR HOPELESS: NOT AT ALL
SUM OF ALL RESPONSES TO PHQ QUESTIONS 1-9: 0
SUM OF ALL RESPONSES TO PHQ QUESTIONS 1-9: 0
1. LITTLE INTEREST OR PLEASURE IN DOING THINGS: NOT AT ALL
SUM OF ALL RESPONSES TO PHQ QUESTIONS 1-9: 0

## (undated) DEVICE — FORCEPS BX L240CM WRK CHN 2.8MM STD CAP W/ NDL MIC MESH